# Patient Record
Sex: FEMALE | Race: WHITE | NOT HISPANIC OR LATINO | Employment: UNEMPLOYED | URBAN - METROPOLITAN AREA
[De-identification: names, ages, dates, MRNs, and addresses within clinical notes are randomized per-mention and may not be internally consistent; named-entity substitution may affect disease eponyms.]

---

## 2020-06-04 ENCOUNTER — LAB VISIT (OUTPATIENT)
Dept: PRIMARY CARE CLINIC | Facility: OTHER | Age: 70
End: 2020-06-04
Payer: MEDICARE

## 2020-06-04 DIAGNOSIS — Z11.59 ENCOUNTER FOR SCREENING FOR OTHER VIRAL DISEASES: ICD-10-CM

## 2020-06-04 PROCEDURE — U0003 INFECTIOUS AGENT DETECTION BY NUCLEIC ACID (DNA OR RNA); SEVERE ACUTE RESPIRATORY SYNDROME CORONAVIRUS 2 (SARS-COV-2) (CORONAVIRUS DISEASE [COVID-19]), AMPLIFIED PROBE TECHNIQUE, MAKING USE OF HIGH THROUGHPUT TECHNOLOGIES AS DESCRIBED BY CMS-2020-01-R: HCPCS

## 2020-06-05 LAB — SARS-COV-2 RNA RESP QL NAA+PROBE: NOT DETECTED

## 2021-01-09 ENCOUNTER — IMMUNIZATION (OUTPATIENT)
Dept: PRIMARY CARE CLINIC | Facility: CLINIC | Age: 71
End: 2021-01-09
Payer: MEDICARE

## 2021-01-09 DIAGNOSIS — Z23 NEED FOR VACCINATION: ICD-10-CM

## 2021-01-09 PROCEDURE — 91300 COVID-19, MRNA, LNP-S, PF, 30 MCG/0.3 ML DOSE VACCINE: CPT | Mod: PBBFAC | Performed by: FAMILY MEDICINE

## 2021-01-30 ENCOUNTER — IMMUNIZATION (OUTPATIENT)
Dept: PRIMARY CARE CLINIC | Facility: CLINIC | Age: 71
End: 2021-01-30
Payer: MEDICARE

## 2021-01-30 DIAGNOSIS — Z23 NEED FOR VACCINATION: Primary | ICD-10-CM

## 2021-01-30 PROCEDURE — 91300 COVID-19, MRNA, LNP-S, PF, 30 MCG/0.3 ML DOSE VACCINE: CPT | Mod: PBBFAC | Performed by: EMERGENCY MEDICINE

## 2021-01-30 PROCEDURE — 0002A COVID-19, MRNA, LNP-S, PF, 30 MCG/0.3 ML DOSE VACCINE: CPT | Mod: PBBFAC | Performed by: EMERGENCY MEDICINE

## 2024-02-29 ENCOUNTER — PATIENT MESSAGE (OUTPATIENT)
Dept: HEMATOLOGY/ONCOLOGY | Facility: CLINIC | Age: 74
End: 2024-02-29
Payer: MEDICARE

## 2024-02-29 ENCOUNTER — OFFICE VISIT (OUTPATIENT)
Dept: FAMILY MEDICINE | Facility: CLINIC | Age: 74
End: 2024-02-29
Payer: MEDICARE

## 2024-02-29 VITALS
SYSTOLIC BLOOD PRESSURE: 168 MMHG | DIASTOLIC BLOOD PRESSURE: 80 MMHG | HEART RATE: 72 BPM | OXYGEN SATURATION: 99 % | BODY MASS INDEX: 19.4 KG/M2 | TEMPERATURE: 98 F | WEIGHT: 102.75 LBS | HEIGHT: 61 IN

## 2024-02-29 DIAGNOSIS — R16.0 LIVER MASS: ICD-10-CM

## 2024-02-29 DIAGNOSIS — I70.0 AORTIC ATHEROSCLEROSIS: ICD-10-CM

## 2024-02-29 DIAGNOSIS — E83.52 HYPERCALCEMIA: ICD-10-CM

## 2024-02-29 DIAGNOSIS — R56.9 CONVULSIONS, UNSPECIFIED CONVULSION TYPE: ICD-10-CM

## 2024-02-29 DIAGNOSIS — R91.8 RIGHT LOWER LOBE LUNG MASS: Primary | ICD-10-CM

## 2024-02-29 DIAGNOSIS — Z79.4 TYPE 2 DIABETES MELLITUS WITH STAGE 3A CHRONIC KIDNEY DISEASE, WITH LONG-TERM CURRENT USE OF INSULIN: ICD-10-CM

## 2024-02-29 DIAGNOSIS — E11.22 TYPE 2 DIABETES MELLITUS WITH STAGE 3A CHRONIC KIDNEY DISEASE, WITH LONG-TERM CURRENT USE OF INSULIN: ICD-10-CM

## 2024-02-29 DIAGNOSIS — N18.31 TYPE 2 DIABETES MELLITUS WITH STAGE 3A CHRONIC KIDNEY DISEASE, WITH LONG-TERM CURRENT USE OF INSULIN: ICD-10-CM

## 2024-02-29 PROCEDURE — 99999 PR PBB SHADOW E&M-EST. PATIENT-LVL V: CPT | Mod: PBBFAC,,, | Performed by: INTERNAL MEDICINE

## 2024-02-29 PROCEDURE — 3079F DIAST BP 80-89 MM HG: CPT | Mod: CPTII,S$GLB,, | Performed by: INTERNAL MEDICINE

## 2024-02-29 PROCEDURE — 3077F SYST BP >= 140 MM HG: CPT | Mod: CPTII,S$GLB,, | Performed by: INTERNAL MEDICINE

## 2024-02-29 PROCEDURE — 1159F MED LIST DOCD IN RCRD: CPT | Mod: CPTII,S$GLB,, | Performed by: INTERNAL MEDICINE

## 2024-02-29 PROCEDURE — 1160F RVW MEDS BY RX/DR IN RCRD: CPT | Mod: CPTII,S$GLB,, | Performed by: INTERNAL MEDICINE

## 2024-02-29 PROCEDURE — 3288F FALL RISK ASSESSMENT DOCD: CPT | Mod: CPTII,S$GLB,, | Performed by: INTERNAL MEDICINE

## 2024-02-29 PROCEDURE — 1126F AMNT PAIN NOTED NONE PRSNT: CPT | Mod: CPTII,S$GLB,, | Performed by: INTERNAL MEDICINE

## 2024-02-29 PROCEDURE — 99203 OFFICE O/P NEW LOW 30 MIN: CPT | Mod: S$GLB,,, | Performed by: INTERNAL MEDICINE

## 2024-02-29 PROCEDURE — 1101F PT FALLS ASSESS-DOCD LE1/YR: CPT | Mod: CPTII,S$GLB,, | Performed by: INTERNAL MEDICINE

## 2024-02-29 RX ORDER — FUROSEMIDE 20 MG/1
20 TABLET ORAL 2 TIMES DAILY
COMMUNITY

## 2024-02-29 RX ORDER — LINAGLIPTIN 5 MG/1
5 TABLET, FILM COATED ORAL DAILY
COMMUNITY

## 2024-02-29 RX ORDER — LEVETIRACETAM 500 MG/1
TABLET ORAL
COMMUNITY

## 2024-02-29 RX ORDER — SOLIFENACIN SUCCINATE 10 MG/1
1 TABLET, FILM COATED ORAL DAILY
COMMUNITY
Start: 2023-07-04

## 2024-02-29 RX ORDER — ASPIRIN 81 MG/1
81 TABLET ORAL DAILY
COMMUNITY

## 2024-02-29 RX ORDER — EZETIMIBE 10 MG/1
10 TABLET ORAL DAILY
COMMUNITY

## 2024-02-29 RX ORDER — CARVEDILOL 12.5 MG/1
1 TABLET ORAL 2 TIMES DAILY WITH MEALS
COMMUNITY
Start: 2023-10-02

## 2024-02-29 RX ORDER — PANTOPRAZOLE SODIUM 40 MG/1
40 TABLET, DELAYED RELEASE ORAL EVERY MORNING
COMMUNITY
Start: 2023-12-03

## 2024-02-29 RX ORDER — MONTELUKAST SODIUM 10 MG/1
10 TABLET ORAL
COMMUNITY
Start: 2023-12-27

## 2024-02-29 RX ORDER — NITROGLYCERIN 0.4 MG/1
0.4 TABLET SUBLINGUAL
COMMUNITY

## 2024-02-29 RX ORDER — DRONABINOL 2.5 MG/1
2.5 CAPSULE ORAL
COMMUNITY

## 2024-02-29 RX ORDER — HYDRALAZINE HYDROCHLORIDE 25 MG/1
25 TABLET, FILM COATED ORAL 3 TIMES DAILY
COMMUNITY
End: 2024-05-07

## 2024-02-29 RX ORDER — LISINOPRIL 40 MG/1
1 TABLET ORAL DAILY
COMMUNITY
Start: 2023-10-02

## 2024-02-29 NOTE — PROGRESS NOTES
"Subjective:       Patient ID: Kylah Deal is a 73 y.o. female.    Chief Complaint: Establish Care and Hospital Follow Up    Est PCP/discuss outside testing    HPI: 72 y/o IDDM HTN seizure disorder presents alone to establish primary care. She was in usual state of health when she had lab work done for he cardiologist in early 2024. Noted elevated calcium to 13mg/dL prompting evaluation in outside ED> CT of chest abdomen and pelvis showed right hilar mass extending to right lower lobe and right lobe of liver mass. She was treated in hospital with IV fluids and lasix to decrease her calcium she also received subcutaneous prolia (had been getting this every six months for at least one year prior via her endocrinologist). She was discharged with follow up outpatient bronchoscopy performed 2024. Post bronchoscopy she became acutely short of breath and was again admitted for volume overload. She underwent coronary angiogram 2024 for NSTEMI and found to have  of RCA (no intervention performed). Since discharge she has seen outside pulmonologist at [facility] (Dr. Ron Eldridge). Unfortunately biopsies from her bronchoscopy in January were non diagnostic. She is now scheduled for mediastinabl lymph node biopsy tomorrow (2024) at [facility]. She presents today request referrals for these specialists due to insurance requirement. She denies any pain. She notes long standing lower back pain (at least last year) and constipation. She is taking laxative having bowel movement every other day. No LE swelling breathign "fine" not using any supplemental oxygen    SH: her   from alzhemier's dementia in 2023, she [place] five adult [place] [facility] [place] (one son in [place] [facility], daughter in law PA hospitalist at [facility])    PMHx: diabetes mellitus, seizure disorder, CKD IIIa, CAD      Review of Systems   Constitutional:  Positive for fatigue. Negative for activity change, appetite change, fever and " "unexpected weight change.   HENT:  Negative for ear pain, rhinorrhea and sore throat.    Eyes:  Negative for discharge and visual disturbance.   Respiratory:  Negative for chest tightness, shortness of breath and wheezing.    Cardiovascular:  Negative for chest pain, palpitations and leg swelling.   Gastrointestinal:  Positive for constipation. Negative for abdominal pain and diarrhea.   Endocrine: Negative for cold intolerance and heat intolerance.   Genitourinary:  Negative for dysuria and hematuria.   Musculoskeletal:  Negative for joint swelling and neck stiffness.   Skin:  Negative for rash.   Neurological:  Negative for dizziness, syncope, weakness and headaches.   Psychiatric/Behavioral:  Negative for suicidal ideas.        Objective:     Vitals:    02/29/24 1325   BP: (!) 168/80   BP Location: Left arm   Patient Position: Sitting   BP Method: Large (Manual)   Pulse: 72   Temp: 97.6 °F (36.4 °C)   TempSrc: Oral   SpO2: 99%   Weight: 46.6 kg (102 lb 11.8 oz)   Height: 5' 1" (1.549 m)          Physical Exam  Constitutional:       General: She is not in acute distress.     Comments: Thin no temporal wasting   HENT:      Head: Normocephalic and atraumatic.   Eyes:      General: No scleral icterus.  Abdominal:      General: There is no distension.      Palpations: Abdomen is soft.   Musculoskeletal:      Right lower leg: No edema.      Left lower leg: No edema.   Lymphadenopathy:      Cervical: No cervical adenopathy.   Neurological:      General: No focal deficit present.      Mental Status: She is alert and oriented to person, place, and time.   Psychiatric:         Mood and Affect: Mood normal.         Behavior: Behavior normal.         Thought Content: Thought content normal.         Assessment and Plan   1. Right lower lobe lung mass  Referrals back dated for prior speciality appoitnments to have mediastinal biopsy for tissue diagnosis  - Ambulatory referral/consult to Pulmonology; Future  - Ambulatory " referral/consult to Interventional Radiology; Future    2. Aortic atherosclerosis  On PSCk 9 continue    3. Hypercalcemia  Continue loop diuretic    4. Liver mass  As above    5. Type 2 diabetes mellitus with stage 3a chronic kidney disease, with long-term current use of insulin  Management per endocrine    6. Convulsions, unspecified convulsion type  No seizure in > 5 years on keppra continue

## 2024-03-01 ENCOUNTER — TELEPHONE (OUTPATIENT)
Dept: FAMILY MEDICINE | Facility: CLINIC | Age: 74
End: 2024-03-01
Payer: MEDICARE

## 2024-03-01 DIAGNOSIS — R91.8 RIGHT LOWER LOBE LUNG MASS: Primary | ICD-10-CM

## 2024-03-01 NOTE — TELEPHONE ENCOUNTER
----- Message from Luca Sanders sent at 3/1/2024 12:41 PM CST -----  Regarding: request for an oncology referral to Dr. Santoyo  Type: Patient Call Back    Who called: Kylah Deal      What is the request in detail: the patient is calling to request an oncology referral placed in the system to Dr. Santoyo. Patient stated that she was told on her visit on yesterday that the referral would be placed, but she does not see it. Please return call at earliest convenience.     Can the clinic reply by [facility]?    Would the patient rather a call back or a response via [facility]? Call back     Best call back number:[phone]

## 2024-03-07 ENCOUNTER — PATIENT MESSAGE (OUTPATIENT)
Dept: FAMILY MEDICINE | Facility: CLINIC | Age: 74
End: 2024-03-07
Payer: MEDICARE

## 2024-03-07 DIAGNOSIS — D86.0 SARCOIDOSIS OF LUNG: Primary | ICD-10-CM

## 2024-03-08 ENCOUNTER — PATIENT MESSAGE (OUTPATIENT)
Dept: FAMILY MEDICINE | Facility: CLINIC | Age: 74
End: 2024-03-08
Payer: MEDICARE

## 2024-03-08 ENCOUNTER — TELEPHONE (OUTPATIENT)
Dept: PULMONOLOGY | Facility: CLINIC | Age: 74
End: 2024-03-08
Payer: MEDICARE

## 2024-03-08 NOTE — TELEPHONE ENCOUNTER
I spoke with patient to schedule her appointment with Dr Briggs on 3/14/24 at 10:30am for second opinion for sarcoidosis. Patient has had lung bx, pet scan, pft's. She will bring a copy of her pft's at the time of her visit. Patient confirmed and verbalized understanding.

## 2024-03-14 ENCOUNTER — OFFICE VISIT (OUTPATIENT)
Dept: PULMONOLOGY | Facility: CLINIC | Age: 74
End: 2024-03-14
Payer: MEDICARE

## 2024-03-14 VITALS
WEIGHT: 103.63 LBS | BODY MASS INDEX: 17.27 KG/M2 | DIASTOLIC BLOOD PRESSURE: 80 MMHG | SYSTOLIC BLOOD PRESSURE: 142 MMHG | OXYGEN SATURATION: 99 % | HEIGHT: 65 IN | HEART RATE: 72 BPM

## 2024-03-14 DIAGNOSIS — R93.89 ABNORMAL CT OF THE CHEST: Primary | ICD-10-CM

## 2024-03-14 DIAGNOSIS — D86.0 SARCOIDOSIS OF LUNG: ICD-10-CM

## 2024-03-14 PROCEDURE — 99999 PR PBB SHADOW E&M-EST. PATIENT-LVL V: CPT | Mod: PBBFAC,,, | Performed by: INTERNAL MEDICINE

## 2024-03-14 PROCEDURE — 3079F DIAST BP 80-89 MM HG: CPT | Mod: CPTII,S$GLB,, | Performed by: INTERNAL MEDICINE

## 2024-03-14 PROCEDURE — 99205 OFFICE O/P NEW HI 60 MIN: CPT | Mod: S$GLB,,, | Performed by: INTERNAL MEDICINE

## 2024-03-14 PROCEDURE — 1159F MED LIST DOCD IN RCRD: CPT | Mod: CPTII,S$GLB,, | Performed by: INTERNAL MEDICINE

## 2024-03-14 PROCEDURE — 3077F SYST BP >= 140 MM HG: CPT | Mod: CPTII,S$GLB,, | Performed by: INTERNAL MEDICINE

## 2024-03-14 PROCEDURE — 1160F RVW MEDS BY RX/DR IN RCRD: CPT | Mod: CPTII,S$GLB,, | Performed by: INTERNAL MEDICINE

## 2024-03-14 PROCEDURE — 3288F FALL RISK ASSESSMENT DOCD: CPT | Mod: CPTII,S$GLB,, | Performed by: INTERNAL MEDICINE

## 2024-03-14 PROCEDURE — 1101F PT FALLS ASSESS-DOCD LE1/YR: CPT | Mod: CPTII,S$GLB,, | Performed by: INTERNAL MEDICINE

## 2024-03-14 RX ORDER — FLUTICASONE FUROATE, UMECLIDINIUM BROMIDE AND VILANTEROL TRIFENATATE 200; 62.5; 25 UG/1; UG/1; UG/1
1 POWDER RESPIRATORY (INHALATION)
COMMUNITY
Start: 2024-02-21

## 2024-03-14 RX ORDER — ALBUTEROL SULFATE 90 UG/1
2 AEROSOL, METERED RESPIRATORY (INHALATION) EVERY 4 HOURS PRN
COMMUNITY
Start: 2024-02-21 | End: 2025-02-20

## 2024-03-14 RX ORDER — CLOPIDOGREL BISULFATE 75 MG/1
1 TABLET ORAL DAILY
COMMUNITY
Start: 2024-02-29

## 2024-03-14 RX ORDER — ACETAMINOPHEN 500 MG
1 TABLET ORAL
COMMUNITY

## 2024-03-14 RX ORDER — AMOXICILLIN AND CLAVULANATE POTASSIUM 875; 125 MG/1; MG/1
1 TABLET, FILM COATED ORAL 2 TIMES DAILY
COMMUNITY
Start: 2024-01-14 | End: 2024-03-18

## 2024-03-14 RX ORDER — CODEINE PHOSPHATE AND GUAIFENESIN 10; 100 MG/5ML; MG/5ML
10 SOLUTION ORAL EVERY 4 HOURS PRN
COMMUNITY
Start: 2024-01-14

## 2024-03-14 NOTE — PROGRESS NOTES
Subjective:       Patient ID: Kylah Deal is a 73 y.o. female.    Chief Complaint: Sarcoidosis    HPI:   Kylah Deal is a 73 y.o. female who presents with for evaluation of abnormal imaging.  Accompanied by her daughter.     Noted to have hypercalcemia on routine labs.   This led to imaging.  There was concern for sarcoidosis.  BAL and TBBX were negative for granulomatous disease (which seems unusual given the degree of lung involvement.)   A follow up biopsy of a LN by IR resulted in non-caseating granulomas, however the sample was quite small and the physician did not feel that he had adequately biopsied the area.  The patient is understandably frustrated by the lack of definitive diagnosis at this juncture.     Additional pertinent history:   She reports that nothing tastes right  Reports significant sinus symptoms  Shortness of breath with minimal exertion.  Persistent dry and irritating cough.  She also reports significant weight loss and loss of appetite.    Former smoker. Stopped in .  1/2ppw age [age]   No known occupational exposures.  Father was a   Dad was a heavy smoker- 3ppd;  of lung cancer  Mother with COPD    Review of Systems   Constitutional:  Positive for weight loss and activity change.   HENT:  Positive for postnasal drip and congestion.         Dry mouth   Respiratory:  Positive for cough (for at least a year), sputum production (in the morning), shortness of breath and orthopnea.          Social History     Tobacco Use    Smoking status: Former     Current packs/day: 0.00     Types: Cigarettes     Quit date:      Years since quittin.2    Smokeless tobacco: Never   Substance Use Topics    Alcohol use: Not on file       Review of patient's allergies indicates:   Allergen Reactions    Atorvastatin calcium Other (See Comments)     Pain    Metformin Diarrhea     Diarrhea and cramping    Rosuvastatin Other (See Comments)     pain    Statins-hmg-coa reductase inhibitors       No past medical history on file.  No past surgical history on file.  Current Outpatient Medications on File Prior to Visit   Medication Sig    albuterol (PROVENTIL/VENTOLIN HFA) 90 mcg/actuation inhaler Inhale 2 puffs into the lungs every 4 (four) hours as needed.    amoxicillin-clavulanate 875-125mg (AUGMENTIN) 875-125 mg per tablet Take 1 tablet by mouth 2 (two) times daily.    aspirin (ECOTRIN) 81 MG EC tablet Take 81 mg by mouth once daily.    carvediloL (COREG) 12.5 MG tablet Take 1 tablet by mouth 2 (two) times daily with meals.    cholecalciferol, vitamin D3, (VITAMIN D3) 50 mcg (2,000 unit) Cap capsule Take 1 tablet by mouth.    clopidogreL (PLAVIX) 75 mg tablet Take 1 tablet by mouth once daily.    droNABinol (MARINOL) 2.5 MG capsule Take 2.5 mg by mouth 2 (two) times daily before meals.    evolocumab (REPATHA SURECLICK SUBQ) Inject into the skin.    ezetimibe (ZETIA) 10 mg tablet Take 10 mg by mouth once daily.    fluticasone/umeclidin/vilanter (TRELEGY ELLIPTA INHL) Inhale into the lungs.    furosemide (LASIX) 20 MG tablet Take 20 mg by mouth 2 (two) times daily.    guaiFENesin-codeine 100-10 mg/5 ml (TUSSI-ORGANIDIN NR)  mg/5 mL syrup Take 10 mLs by mouth every 4 (four) hours as needed.    hydrALAZINE (APRESOLINE) 25 MG tablet Take 25 mg by mouth 3 (three) times daily.    levETIRAcetam (KEPPRA) 500 MG Tab     linaGLIPtin (TRADJENTA) 5 mg Tab tablet Take 5 mg by mouth once daily.    lisinopriL (PRINIVIL,ZESTRIL) 40 MG tablet Take 1 tablet by mouth once daily.    montelukast (SINGULAIR) 10 mg tablet Take 10 mg by mouth.    nitroGLYCERIN (NITROSTAT) 0.4 MG SL tablet Place 0.4 mg under the tongue.    pantoprazole (PROTONIX) 40 MG tablet Take 40 mg by mouth every morning.    solifenacin (VESICARE) 10 MG tablet Take 1 tablet by mouth once daily.    TRELEGY ELLIPTA 200-62.5-25 mcg inhaler Inhale 1 puff into the lungs.     No current facility-administered medications on file prior to visit.  "      Objective:      Vitals:    03/14/24 1010   BP: (!) 142/80   BP Location: Right arm   Patient Position: Sitting   Pulse: 72   SpO2: 99%   Weight: 47 kg (103 lb 9.9 oz)   Height: 5' 5" (1.651 m)     Physical Exam   Constitutional: She is oriented to person, place, and time. She appears well-developed and well-nourished.   HENT:   Mouth/Throat: Mallampati Score: III.   Cardiovascular: Normal rate and regular rhythm.   Pulmonary/Chest: Normal expansion and breath sounds normal. She has no wheezes. She has no rhonchi.   Musculoskeletal:         General: No edema.   Lymphadenopathy: No supraclavicular adenopathy is present.     She has no cervical adenopathy.     She has no axillary adenopathy.   Neurological: She is alert and oriented to person, place, and time.   Skin: Skin is warm and dry.   Psychiatric: She has a normal mood and affect. Her behavior is normal. Judgment and thought content normal.     Personal Diagnostic Review        3/14/2024    10:10 AM 2/29/2024     1:25 PM   Pulmonary Function Tests   SpO2 99 % 99 %   Height 5' 5" (1.651 m) 5' 1" (1.549 m)   Weight 47 kg (103 lb 9.9 oz) 46.6 kg (102 lb 11.8 oz)   BMI (Calculated) 17.2 19.4         X-Ray Neck Soft Tissue  DATE OF EXAM: Apr 27 2012   WDX   0184  -  SOFT TISSUE NECK:     [id number]     CLINICAL HISTORY:   PAIN     ICD 9 CODE(S):   ()  CPT 4 CODE(S)/MODIFIER(S):   ()     HISTORY: Pain     COMPARISON: None     FINDINGS: Midline sternotomy wires and mediastinal clips are noted. Mild   multilevel degenerative change of the cervical spine noted.  No evidence   of displaced fracture or dislocation.  No prevertebral soft tissue   swelling.  Epiglottis is normal in thickness.  The vallecula and   piriforms appear within normal limits.  The airway is midline and patent.    No radiodense foreign body identified within the airway.  No   subcutaneous emphysema.  Mild atherosclerotic calcifications noted at the   right carotid bifurcation.      "   IMPRESSION:     As above.  ______________________________________      Electronically signed by: MILTON GUEVARA MD, MD  Date:     04/27/12  Time:    20:43            : SHANTI  Transcribe Date/Time: Apr 27 2012  8:44P  Dictated by : MILTON GUEVARA MD, MD  Read On:      Images were reviewed, findings were verified and document was   electronically  SIGNED BY: MILTON GUEVARA MD, MD On: Apr 27 2012  8:44P     PFTs: 2/8/24: moderate obstruction, moderate restriction, severely reduced DLCO.    Assessment:     Orders Placed This Encounter   Procedures    CT Chest Without Contrast     Standing Status:   Future     Number of Occurrences:   1     Standing Expiration Date:   3/15/2025     Order Specific Question:   May the Radiologist modify the order per protocol to meet the clinical needs of the patient?     Answer:   Yes     1. Abnormal CT of the chest    2. Sarcoidosis of lung        Plan:         Problem List Items Addressed This Visit          Other    Abnormal CT of the chest - Primary    Current Assessment & Plan     Imaging reviewed in detail.   CT Chest and PET from outside hospital show a diffuse parenchymal lung process.    The images could certainly be consistent with consistent with stage 3 sarcoidosis, but the weight loss and clinical history also keep metastatic cancer on the list of differential diagnoses.    I recommend a repeat CT Chest and repeat biopsy.  The PET shows a highly PET-Avid region in the sternum that may provide valuable information.    I have reached out to oncology to see if they would prefer to do the biopsy, or if IR would be acceptable.           Relevant Orders    CT Chest Without Contrast (Completed)     Other Visit Diagnoses       Sarcoidosis of lung               65 minutes of total time spent on the encounter, which includes face to face time and non-face to face time preparing to see the patient (eg, review of tests), Obtaining and/or reviewing separately obtained history,  Documenting clinical information in the electronic or other health record, Independently interpreting results (not separately reported) and communicating results to the patient/family/caregiver, or Care coordination (not separately reported).

## 2024-03-15 ENCOUNTER — PATIENT MESSAGE (OUTPATIENT)
Dept: PULMONOLOGY | Facility: CLINIC | Age: 74
End: 2024-03-15
Payer: MEDICARE

## 2024-03-15 ENCOUNTER — TELEPHONE (OUTPATIENT)
Dept: PULMONOLOGY | Facility: CLINIC | Age: 74
End: 2024-03-15
Payer: MEDICARE

## 2024-03-18 ENCOUNTER — OFFICE VISIT (OUTPATIENT)
Dept: HEMATOLOGY/ONCOLOGY | Facility: CLINIC | Age: 74
End: 2024-03-18
Payer: MEDICARE

## 2024-03-18 ENCOUNTER — PATIENT MESSAGE (OUTPATIENT)
Dept: HEMATOLOGY/ONCOLOGY | Facility: CLINIC | Age: 74
End: 2024-03-18

## 2024-03-18 ENCOUNTER — HOSPITAL ENCOUNTER (OUTPATIENT)
Dept: RADIOLOGY | Facility: HOSPITAL | Age: 74
Discharge: HOME | End: 2024-03-18
Attending: INTERNAL MEDICINE
Payer: MEDICARE

## 2024-03-18 ENCOUNTER — PATIENT MESSAGE (OUTPATIENT)
Dept: ADMINISTRATIVE | Facility: HOSPITAL | Age: 74
End: 2024-03-18
Payer: MEDICARE

## 2024-03-18 VITALS
TEMPERATURE: 98 F | HEART RATE: 76 BPM | RESPIRATION RATE: 16 BRPM | WEIGHT: 102.06 LBS | SYSTOLIC BLOOD PRESSURE: 200 MMHG | BODY MASS INDEX: 17 KG/M2 | HEIGHT: 65 IN | DIASTOLIC BLOOD PRESSURE: 77 MMHG

## 2024-03-18 DIAGNOSIS — I25.10 CORONARY ARTERY DISEASE INVOLVING NATIVE CORONARY ARTERY OF NATIVE HEART WITHOUT ANGINA PECTORIS: ICD-10-CM

## 2024-03-18 DIAGNOSIS — M54.2 NECK PAIN: ICD-10-CM

## 2024-03-18 DIAGNOSIS — R79.89 OTHER SPECIFIED ABNORMAL FINDINGS OF BLOOD CHEMISTRY: ICD-10-CM

## 2024-03-18 DIAGNOSIS — R97.8 OTHER ABNORMAL TUMOR MARKERS: ICD-10-CM

## 2024-03-18 DIAGNOSIS — R93.89 ABNORMAL CT OF THE CHEST: ICD-10-CM

## 2024-03-18 DIAGNOSIS — R16.0 LIVER MASS: ICD-10-CM

## 2024-03-18 DIAGNOSIS — M81.0 AGE-RELATED OSTEOPOROSIS WITHOUT CURRENT PATHOLOGICAL FRACTURE: ICD-10-CM

## 2024-03-18 DIAGNOSIS — I10 PRIMARY HYPERTENSION: ICD-10-CM

## 2024-03-18 DIAGNOSIS — R93.89 ABNORMAL CT OF THE CHEST: Primary | ICD-10-CM

## 2024-03-18 DIAGNOSIS — E11.9 TYPE 2 DIABETES MELLITUS WITHOUT COMPLICATION, WITHOUT LONG-TERM CURRENT USE OF INSULIN: ICD-10-CM

## 2024-03-18 PROCEDURE — 99205 OFFICE O/P NEW HI 60 MIN: CPT | Mod: S$GLB,,, | Performed by: INTERNAL MEDICINE

## 2024-03-18 PROCEDURE — 3288F FALL RISK ASSESSMENT DOCD: CPT | Mod: CPTII,S$GLB,, | Performed by: INTERNAL MEDICINE

## 2024-03-18 PROCEDURE — 3077F SYST BP >= 140 MM HG: CPT | Mod: CPTII,S$GLB,, | Performed by: INTERNAL MEDICINE

## 2024-03-18 PROCEDURE — 1125F AMNT PAIN NOTED PAIN PRSNT: CPT | Mod: CPTII,S$GLB,, | Performed by: INTERNAL MEDICINE

## 2024-03-18 PROCEDURE — 3078F DIAST BP <80 MM HG: CPT | Mod: CPTII,S$GLB,, | Performed by: INTERNAL MEDICINE

## 2024-03-18 PROCEDURE — 71250 CT THORAX DX C-: CPT | Mod: 26,,, | Performed by: STUDENT IN AN ORGANIZED HEALTH CARE EDUCATION/TRAINING PROGRAM

## 2024-03-18 PROCEDURE — 1101F PT FALLS ASSESS-DOCD LE1/YR: CPT | Mod: CPTII,S$GLB,, | Performed by: INTERNAL MEDICINE

## 2024-03-18 PROCEDURE — 99999 PR PBB SHADOW E&M-EST. PATIENT-LVL V: CPT | Mod: PBBFAC,,, | Performed by: INTERNAL MEDICINE

## 2024-03-18 PROCEDURE — 71250 CT THORAX DX C-: CPT | Mod: TC

## 2024-03-18 RX ORDER — INSULIN ASPART 100 [IU]/ML
2 INJECTION, SOLUTION INTRAVENOUS; SUBCUTANEOUS
COMMUNITY
Start: 2024-03-05

## 2024-03-18 RX ORDER — DENOSUMAB 60 MG/ML
60 INJECTION SUBCUTANEOUS
COMMUNITY

## 2024-03-18 NOTE — PROGRESS NOTES
[facility]     Outpatient Medical Oncology Note  Patient: Kylah Deal  MRN: [id number]  Primary Care Provider: Osmar Cruz MD  Chief Complaint:  Enrrique Hepatic Mass, RML Pulmonary Mass, Adenopathy, and Bone Lesions     Subjective     Subjective:   HPI:   Kylah Deal is a 73 y.o. female with PMH significant for:   - CAD s/p CABG (Plavix)  - DM2 (A1c: 6.8%, linagliptin)  - HTN (lisinopril)  - Possible COPD (PFTs - 2/8/24 - FEV1: 50-79%, mild restriction, decreased DLCO<40%)  - HTN  - Possible Seizure Disorder (Keppra)     She is referred to Medical Oncology as recent imaging performed in evaluation of hypercalcemia revealed a Enrrique Hepatic Mass, RML Pulmonary Mass, Adenopathy, and Bone Lesions, with recent CT guided core needle mediastinal node biopsy revealing focal noncaseating granulomatous inflammation w/o malignancy.      Oncology History:  1/2023: vague RUQ abdominal pain prompting a CT A/P - did not show biliary disease but did reveal opacities in right lung   1/2023: noted to be hypercalcemic (13) during [place] [facility] [place] hypercalcemia worsened in 1/2024 (14) prompting imaging below  1/13/2024: CT CAP w/ IV contrast: scattered pulmonary nodules - largest is a 4.3 cm Right hilar/RML pulmonary mass w/ occlusion of the RML bronchus, mass in the enrrique hepatis (surrounds the portal vein, CBD dilation, extending into the RP surrounding the aorta/iliac and SMA; tumor does not appear to originate in the pancreas, could be conglomerate node or primary mass, numerous liver lesions c/f metastasis, mesenteric adenopathy, splenic infarcts   1/31/23: Bronchoscopy: PATHOLOGY: RLL lung bx, LLL lung bx, Right mainstem bronchus, BAL, negative for carcinoma or granulomatous disease  2/20/2024: PET/CT: Hypermetabolic infiltrative mass in the enrrique hepatis/RP (superior/posterior to pancreas, SUV: 7.3, surrounding CBD), lungs, right hilum, mediastinum, pleura, pericardium,  liver (small, SUV: 3.4), mesenteric, RP, and pelvic adenopathy, left SC (SUV: 3.7), right neck (SUV: 2.4, inferior to parotid), diffuse interstitial involvement in both lungs (interstitial thickening/nodularity, mildly thickened pleural w/ uptake), bone (right femoral neck, pelvis, spine, scapula, sternum, SUV: 5.8)  3/1/2024: CT guided core needle mediastinal lymph node biopsy - focal noncaseating granulomatous inflammation - results are discordant and remain concerning for malignancy.    Interval History:    Mrs. Deal is overall doing well. She is accompanied today by 2 of her sons.     Patient's current symptoms are:  (1) Mild productive cough: chronic x numerous years, unchanged  (2) Fatigue: chronic but progressive over the last year  (3) MAHONEY: new over the last few months, no overt chest pain.   (4) HA: She has occasional new headaches w/o focal neurologic deficits. HA's are mild but regular, no associated visual changes, N/V, etc.  (5) Neck/L shoulder pain - started over the last several months as well, pain is mild, persistent not progressive, intermittent but daily, not currently requiring analgesia. Patient denies trauma, bowel or bladder incontinence, saddle anesthesia, neurologic deficit/weakness - particularly extremity weakness, numbness or tingling. No difficulties with ROM of the shoulder.    Patient otherwise denies fevers, chills, night sweats, chest pain, abdominal pain, N/V, diarrhea, constipation, weight loss, rashes     Review of Systems:  14-point review of systems was asked with the pertinent positives and/or negatives stated in HPI.     Past Medical History:   - CAD s/p CABG in 2000 (Plavix), T2DM, HTN (lisinopril), COPD (PFTs - 2/8/24 - FEV1: 50-79%, mild restriction, decreased DLCO<40%), GERD, HLD, HTN, Seizure Disorder (Dx in 2020, Keppra), Osteoporosis (on Prolia)    Past Surgical HIstory:  Cholecystectomy, POLINA, appendectomy, Left hip allison    Family History:   - Dad: Lung Cancer  (smoker)  - Sister: Breast Cancer (40s)  - Maternal Grandmother: Colon Cancer (70s)  - Paternal Aunt: Colon (50s)  - Maternal Uncle: Cancer NOS    Social History:   Lives: [place]  Recently   Children: Yes   Tobacco use: Former smoker - smoked from her 30s-50s, half pack per week, quit in 2008  Alcohol use: denies   Illicit drug use: denies    reports that she quit smoking about 16 years ago. Her smoking use included cigarettes. She has never used smokeless tobacco.     Allergies:  Review of patient's allergies indicates:   Allergen Reactions    Atorvastatin calcium Other (See Comments)     Pain    Metformin Diarrhea     Diarrhea and cramping    Rosuvastatin Other (See Comments)     pain    Statins-hmg-coa reductase inhibitors        Medications:  Current Outpatient Medications   Medication Instructions    albuterol (PROVENTIL/VENTOLIN HFA) 90 mcg/actuation inhaler 2 puffs, Inhalation, Every 4 hours PRN    aspirin (ECOTRIN) 81 mg, Oral, Daily    carvediloL (COREG) 12.5 MG tablet 1 tablet, Oral, 2 times daily with meals    cholecalciferol, vitamin D3, (VITAMIN D3) 50 mcg (2,000 unit) Cap capsule 1 tablet, Oral    ciprofloxacin (CIPRO) 500 mg, Oral, 2 times daily    clopidogreL (PLAVIX) 75 mg tablet 1 tablet, Oral, Daily    droNABinol (MARINOL) 2.5 mg, Oral, 2 times daily before meals    evolocumab (REPATHA SURECLICK SUBQ) Subcutaneous    ezetimibe (ZETIA) 10 mg, Oral, Daily    fluticasone (VERAMYST) 27.5 mcg/actuation nasal spray 2 sprays, Nasal, Daily    furosemide (LASIX) 20 mg, Oral, 2 times daily    guaiFENesin-codeine 100-10 mg/5 ml (TUSSI-ORGANIDIN NR)  mg/5 mL syrup 10 mLs, Oral, Every 4 hours PRN    hydrALAZINE (APRESOLINE) 25 mg, Oral, 3 times daily    insulin aspart U-100 (NOVOLOG) 2 Units, Subcutaneous, As needed (PRN)    levETIRAcetam (KEPPRA) 500 MG Tab     lisinopriL (PRINIVIL,ZESTRIL) 40 MG tablet 1 tablet, Oral, Daily    montelukast (SINGULAIR) 10 mg, Oral    nitroGLYCERIN (NITROSTAT) 0.4  "mg, Sublingual    pantoprazole (PROTONIX) 40 mg, Oral, Every morning    PROLIA 60 mg, Subcutaneous, Every 6 months    solifenacin (VESICARE) 10 MG tablet 1 tablet, Oral, Daily    TRADJENTA 5 mg, Oral, Daily    TRELEGY ELLIPTA 200-62.5-25 mcg inhaler 1 puff, Inhalation          Objective:   Vitals:   Vitals:    03/18/24 0911   BP: (!) 200/77   BP Location: Right arm   Patient Position: Sitting   BP Method: Medium (Automatic)   Pulse: 76   Resp: 16   Temp: 97.5 °F (36.4 °C)   TempSrc: Oral   Weight: 46.3 kg (102 lb 1.2 oz)   Height: 5' 5" (1.651 m)     BMI: Body mass index is 16.99 kg/m².  ECOG Performance Status: 1    Physical Exam     General Appearance:    Alert, cooperative, no distress    Head:    Normocephalic, without obvious abnormality, atraumatic   Throat:   Lips, mucosa, and tongue normal; teeth and gums normal   Neck:   Supple, symmetrical, no adenopathy;     thyroid:  no enlargement/tenderness/nodules   Lungs:     Clear to auscultation bilaterally, respirations unlabored    Heart:    Regular rate and rhythm, S1 and S2 normal   Abdomen:     Soft, non-tender, bowel sounds active, no masses, no organomegaly   Extremities:   Extremities normal, atraumatic, no cyanosis or edema   Pulses:   2+ and symmetric all extremities   Skin:   Skin color, texture, turgor normal, no rashes or lesions   Lymph nodes:   Cervical, supraclavicular, and axillary nodes normal   Neurologic:   CNII-XII intact, normal strength, sensation      Laboratory Data:  Lab Visit on 03/18/2024   Component Date Value Ref Range Status    WBC 03/18/2024 6.45  3.90 - 12.70 K/uL Final    RBC 03/18/2024 4.24  4.00 - 5.40 M/uL Final    Hemoglobin 03/18/2024 12.1  12.0 - 16.0 g/dL Final    Hematocrit 03/18/2024 37.3  37.0 - 48.5 % Final    MCV 03/18/2024 88  82 - 98 fL Final    MCH 03/18/2024 28.5  27.0 - 31.0 pg Final    MCHC 03/18/2024 32.4  32.0 - 36.0 g/dL Final    RDW 03/18/2024 13.4  11.5 - 14.5 % Final    Platelets 03/18/2024 332  150 - 450 " K/uL Final    MPV 03/18/2024 11.5  9.2 - 12.9 fL Final    Immature Granulocytes 03/18/2024 0.5  0.0 - 0.5 % Final    Gran # (ANC) 03/18/2024 4.4  1.8 - 7.7 K/uL Final    Immature Grans (Abs) 03/18/2024 0.03  0.00 - 0.04 K/uL Final    Comment: Mild elevation in immature granulocytes is non specific and   can be seen in a variety of conditions including stress response,   acute inflammation, trauma and pregnancy. Correlation with other   laboratory and clinical findings is essential.      Lymph # 03/18/2024 1.0  1.0 - 4.8 K/uL Final    Mono # 03/18/2024 0.7  0.3 - 1.0 K/uL Final    Eos # 03/18/2024 0.3  0.0 - 0.5 K/uL Final    Baso # 03/18/2024 0.02  0.00 - 0.20 K/uL Final    nRBC 03/18/2024 0  0 /100 WBC Final    Gran % 03/18/2024 67.5  38.0 - 73.0 % Final    Lymph % 03/18/2024 15.7 (L)  18.0 - 48.0 % Final    Mono % 03/18/2024 11.2  4.0 - 15.0 % Final    Eosinophil % 03/18/2024 4.8  0.0 - 8.0 % Final    Basophil % 03/18/2024 0.3  0.0 - 1.9 % Final    Differential Method 03/18/2024 Automated   Final    Sodium 03/18/2024 141  136 - 145 mmol/L Final    Potassium 03/18/2024 3.8  3.5 - 5.1 mmol/L Final    Chloride 03/18/2024 103  95 - 110 mmol/L Final    CO2 03/18/2024 27  23 - 29 mmol/L Final    Glucose 03/18/2024 150 (H)  70 - 110 mg/dL Final    BUN 03/18/2024 18  8 - 23 mg/dL Final    Creatinine 03/18/2024 0.9  0.5 - 1.4 mg/dL Final    Calcium 03/18/2024 11.9 (H)  8.7 - 10.5 mg/dL Final    Total Protein 03/18/2024 8.0  6.0 - 8.4 g/dL Final    Albumin 03/18/2024 3.6  3.5 - 5.2 g/dL Final    Total Bilirubin 03/18/2024 0.4  0.1 - 1.0 mg/dL Final    Comment: For infants and newborns, interpretation of results should be based  on gestational age, weight and in agreement with clinical  observations.    Premature Infant recommended reference ranges:  Up to 24 hours.............<8.0 mg/dL  Up to 48 hours............<12.0 mg/dL  3-5 days..................<15.0 mg/dL  6-29 days.................<15.0 mg/dL      Alkaline  Phosphatase 03/18/2024 83  55 - 135 U/L Final    AST 03/18/2024 21  10 - 40 U/L Final    ALT 03/18/2024 20  10 - 44 U/L Final    eGFR 03/18/2024 >60.0  >60 mL/min/1.73 m^2 Final    Anion Gap 03/18/2024 11  8 - 16 mmol/L Final    Protein, Serum 03/18/2024 7.1  6.0 - 8.4 g/dL Final    Comment: Serum protein electrophoresis and immunofixation results should be   interpreted in clinical context in that some therapeutic agents can   result   in false positive results (example, daratumumab). Correlation with   the   patient s therapeutic regimen is required.      Albumin 03/18/2024 3.78  3.35 - 5.55 g/dL Final    Alpha-1 03/18/2024 0.45 (H)  0.17 - 0.41 g/dL Final    Alpha-2 03/18/2024 1.11 (H)  0.43 - 0.99 g/dL Final    Beta 03/18/2024 0.80  0.50 - 1.10 g/dL Final    Gamma 03/18/2024 0.97  0.67 - 1.58 g/dL Final    Immunofix Interp. 03/18/2024 SEE COMMENT   Final    Comment: Serum protein electrophoresis and immunofixation results should be   interpreted in clinical context in that some therapeutic agents can   result   in false positive results (example, daratumumab). Correlation with   the   patient s therapeutic regimen is required.  See pathologist's interpretation.      [place] Free Light Chains 03/18/2024 2.36 (H)  0.33 - 1.94 mg/dL Final    Lambda Free Light Chains 03/18/2024 2.09  0.57 - 2.63 mg/dL Final    Kappa/Lambda FLC Ratio 03/18/2024 1.13  0.26 - 1.65 Final    Comment: Undetected antigen excess is a rare event but cannot   be excluded. If these free light chain results do not   agree with other clinical or laboratory findings or   if the sample is from a patient that has previously   demonstrated antigen excess, discuss with the testing   laboratory.   Results should always be interpreted in conjunction   with other laboratory tests and clinical evidence.      Iron 03/18/2024 46  30 - 160 ug/dL Final    Transferrin 03/18/2024 248  200 - 375 mg/dL Final    TIBC 03/18/2024 367  250 - 450 ug/dL Final     Saturated Iron 03/18/2024 13 (L)  20 - 50 % Final    Ferritin 03/18/2024 216  20.0 - 300.0 ng/mL Final    HIV 1/2 Ag/Ab 03/18/2024 Non-reactive  Non-reactive Final    Hepatitis C Ab 03/18/2024 Non-reactive  Non-reactive Final    Hep B Core Total Ab 03/18/2024 Non-reactive  Non-reactive Final    Hep B S Ab 03/18/2024 <3.00  mIU/mL Final    Hep B S Ab 03/18/2024 Non-reactive   Final    Individual is considered not immune to HBV infection.    Hepatitis B Surface Ag 03/18/2024 Non-reactive  Non-reactive Final    Pathologist Review 03/18/2024 Review completed   Final    LD 03/18/2024 148  110 - 260 U/L Final    Results are increased in hemolyzed samples.    Retic 03/18/2024 1.9  0.5 - 2.5 % Final    CEA 03/18/2024 1.7  0.0 - 5.0 ng/mL Final    Comment: CEA Normal Range:  Non-Smokers: 0-3.0 ng/mL  Smokers:     0-5.0 ng/mL    The testing method is a chemiluminescent microparticle immunoassay   manufactured by [employer] and performed on the [employer]   or   [employer] system. Values obtained with different assay manufacturers   for   methods may be different and cannot be used interchangeably.      CA 19-9 03/18/2024 25.4  0.0 - 40.0 U/mL Final    Comment: The testing method is a chemiluminescent microparticle immunoassay   manufactured by [employer] and performed on the [employer]   or   [employer] system. Values obtained with different assay manufacturers   for   methods may be different and cannot be used interchangeably.       03/18/2024 83 (H)  0 - 30 U/mL Final    Comment: The testing method is a chemiluminescent microparticle immunoassay   manufactured by [employer] and performed on the [employer]   or   [employer] system. Values obtained with different assay manufacturers   for   methods may be different and cannot be used interchangeably.      AFP 03/18/2024 <2.0  0.0 - 8.4 ng/mL Final    Comment: The testing method is a chemiluminescent microparticle immunoassay   manufactured by  [employer] and performed on the    or   [place] system. Values obtained with different assay manufacturers   for   methods may be different and cannot be used interchangeably.      Beta-2 Microglobulin 2024 4.5 (H)  0.0 - 2.5 ug/mL Final    Uric Acid 2024 2.7  2.4 - 5.7 mg/dL Final    Pathologist Review Peripheral Smear 2024 REVIEWED   Final    Comment:   Electronically reviewed and signed by:  Tyrel Salgado MD  Signed on 24 at 11:48  PATHOLOGIST INTERPRETATION  RBC- No significant morphologic abnormalities, normal indices  WBC- Normal morphology.  No dysplasia or circulating blasts.   PLT- Normal in number and morphology. No clumping.       Pathologist Interpretation GRACE 2024 REVIEWED   Final    Comment:   Electronically reviewed and signed by:  Telma Romero MD  Signed on 24 at 13:24  Faint IgM lambda specific monoclonal band present.      Pathologist Interpretation SPE 2024 REVIEWED   Final    Comment:   Electronically reviewed and signed by:  Telma Romero MD  Signed on 24 at 13:24  Normal total protein.  Faint paraprotein band in gamma = 0.21 g/dL.       Recent Labs   Lab Result Units 24  1106   WBC K/uL 6.45   Hemoglobin g/dL 12.1   Hematocrit % 37.3   Platelets K/uL 332     Recent Labs   Lab Result Units 24  1106   Creatinine mg/dL 0.9   AST U/L 21   ALT U/L 20     Recent Labs   Lab Result Units 24  1106   Iron ug/dL 46   Ferritin ng/mL 216        Imagin2024: CT CAP w/ IV contrast: scattered pulmonary nodules - largest is a 4.3 cm Right hilar/RML pulmonary mass w/ occlusion of the RML bronchus, mass in the anastacia hepatis (surrounds the portal vein, CBD dilation, extending into the RP surrounding the aorta/iliac and SMA; tumor does not appear to originate in the pancreas, could be conglomerate node or primary mass, numerous liver lesions c/f metastasis, mesenteric adenopathy, splenic infarcts  "    2/20/2024: PET/CT: Hypermetabolic infiltrative mass in the enrrique hepatis/RP (superior/posterior to pancreas, SUV: 7.3, surrounding CBD), lungs, right hilum, mediastinum, pleura, pericardium, liver (small, SUV: 3.4), mesenteric, RP, and pelvic adenopathy, left SC (SUV: 3.7), right neck (SUV: 2.4, inferior to parotid), diffuse interstitial involvement in both lungs (interstitial thickening/nodularity, mildly thickened pleural w/ uptake), bone (right femoral neck, pelvis, spine, scapula, sternum, SUV: 5.8)    Pathology:   3/1/2024: CT guided core needle mediastinal lymph node biopsy - focal noncaseating granulomatous inflammation, "one of the fragments show a few small non-caseating granulomas, no evidence of malignancy"    Assessment   Assessment and Plan:   Kylah Deal is a 73 y.o. female former smoker with CAD s/p CABG, DM2, HTN, possible COPD (PFTs - 2/8/24 - FEV1: 50-79%, mild restriction, decreased DLCO<40%), HTN, Possible Seizure Disorder (Keppra). She is referred to Medical Oncology as recent imaging performed in evaluation of hypercalcemia revealed a Enrrique Hepatic Mass, RML Pulmonary Mass, Adenopathy, and Bone Lesions, with recent CT guided core needle mediastinal node biopsy revealing focal noncaseating granulomatous inflammation w/o malignancy.      # RML Pulmonary Mass  # Enrrique Hepatic Mass  # Adenopathy  # Bone Lesions   - Reviewed outside CT and subsequent [place] [facility] [place] a hypermetabolic enrrique hepatic mass, RML pulmonary mass with occlusion of the RML bronchus, scattered adenopathy, and bone lesions. CT guided core needle mediastinal node biopsy on 3/1/2024 revealing a small fragment of noncaseating granulomas w/o overt malignancy. She has also had a bronch w/ biopsies in [place] at [facility] [place] w/ negative results.   - Her scans are discordant to the biopsy results and remain concerning for malignancy. Will review at thoracic MDC to discuss utility of re-biopsy for more tissue, best site to " biopsy, and pulmonology's opinion on if this could represent sarcoidosis.   - Given headaches/neck/L shoulder pain, will obtain MRI for further evaluation     # Other Co-Morbidities:  - CAD s/p CABG (Plavix), DM2 (linagliptin), HTN (lisinopril), COPD (PFTs - 24 - FEV1: 50-79%, mild restriction, decreased DLCO<40%), HTN, Possible Seizure Disorder (Keppra): stable on current regimen, per PCP (outside of [facility])  - Genetics: pending above, consider genetic testing given strong FH  - Other Cancer Screening: Colonoscopy: 3/22/2019, [id number], Pap: POLINA in     Follow Up:  - Labs: CBC, CMP, liquid biopsy, iron labs, SPEP/GRACE/FLC, HIV/hepatitis serologies, peripheral smear, tumor markers- CEA, , , AFP, LDH, beta 2 microglobulin, uric acid   - Referrals: IR to biopsy  - Imaging: MRI brain/neck and shoulder   - RTC after MDC discussion  [] genetics, prolia      Med Onc Chart Routing      Follow up with physician . RTC on 3/28   Follow up with [facility]    Infusion scheduling note    Injection scheduling note    Labs   Scheduling:  Preferred lab:  Lab interval:  Please schedule all labs for today (including tempus)   Imaging MRI   Brain, Neck and Left shoulder MRIs ASAP   Pharmacy appointment    Other referrals              MDM includes:    - Acute or chronic illness or injury that poses a threat to life or bodily function  - Consideration and discussion of significant complications based on comorbidities  - Review of prior external notes from unique source and review of diagnostic tests and information  - Independent review and explanation of 3+ results from unique tests   - Discussion of management and ordering 3+ unique tests   - Extensive discussion of treatment and management including consideration of possible diagnoses and management options  - Prescription drug management  - Drug therapy requiring intensive monitoring for toxicity    - Patient seen in diagnosis clinic.   - Overall, I discussed the  diagnosis, history, stage, labs/imaging, prognosis, management, and treatment plan as applicable. I reviewed adverse short and long term effects as applicable.   - Informed patient if symptoms are getting worse that it is their responsibility to call the clinic and schedule follow up sooner than stated follow up. Also informed patient if they do not hear from the appointment center in 2-5 business days for their referrals, the patient must call the Oncology clinic so we can follow up on procedures or referral scheduling. Patient was fully informed of current medical plan, all questions were answered and patient verbalized understanding. No further questions.   - Face to Face Visit time I spent with the patient: 60 minutes of total time spent on the encounter, including counseling patient and/or coordinating care, which includes face to face time and non-face to face time preparing to see the patient (eg, review of tests), Obtaining and/or reviewing separately obtained history, Documenting clinical information in the electronic or other health record, Independently interpreting results (not separately reported) and communicating results to the patient/family/caregiver, or Care coordination (not separately reported).     Signed   Karla Sanchez MD  [facility]

## 2024-03-19 ENCOUNTER — PATIENT OUTREACH (OUTPATIENT)
Dept: ADMINISTRATIVE | Facility: HOSPITAL | Age: 74
End: 2024-03-19
Payer: MEDICARE

## 2024-03-19 DIAGNOSIS — Z12.11 COLON CANCER SCREENING: Primary | ICD-10-CM

## 2024-03-19 NOTE — ASSESSMENT & PLAN NOTE
Imaging reviewed in detail.   CT Chest and PET from outside hospital show a diffuse parenchymal lung process.    The images could certainly be consistent with consistent with stage 3 sarcoidosis, but the weight loss and clinical history also keep metastatic cancer on the list of differential diagnoses.    I recommend a repeat CT Chest and repeat biopsy.  The PET shows a highly PET-Avid region in the sternum that may provide valuable information.    I have reached out to oncology to see if they would prefer to do the biopsy, or if IR would be acceptable.

## 2024-03-21 ENCOUNTER — OFFICE VISIT (OUTPATIENT)
Dept: INTERVENTIONAL RADIOLOGY/VASCULAR | Facility: CLINIC | Age: 74
End: 2024-03-21
Payer: MEDICARE

## 2024-03-21 DIAGNOSIS — R91.8 PULMONARY MASS: Primary | ICD-10-CM

## 2024-03-21 DIAGNOSIS — D49.9 NEOPLASM: ICD-10-CM

## 2024-03-21 PROCEDURE — 99204 OFFICE O/P NEW MOD 45 MIN: CPT | Mod: 95,,, | Performed by: FAMILY MEDICINE

## 2024-03-21 PROCEDURE — 1160F RVW MEDS BY RX/DR IN RCRD: CPT | Mod: CPTII,95,, | Performed by: FAMILY MEDICINE

## 2024-03-21 PROCEDURE — 1159F MED LIST DOCD IN RCRD: CPT | Mod: CPTII,95,, | Performed by: FAMILY MEDICINE

## 2024-03-21 RX ORDER — CIPROFLOXACIN 500 MG/5ML
500 KIT ORAL 2 TIMES DAILY
COMMUNITY
End: 2024-04-17

## 2024-03-21 NOTE — Clinical Note
"Thank you for referring Ms. Deal to Interventional Radiology at the [facility]. Please don't hesitate to contact us if there are any questions regarding this evaluation at [phone]. If you have any other patients for whom you would like a consultation, please place an order for "[id number]", and we will be happy to review their case.  Sincerely, BRIANNA Dinero, FNP Interventional Radiology   "

## 2024-03-21 NOTE — PROGRESS NOTES
"Subjective     Patient ID: Kylah Deal is a 73 y.o. female.    Chief Complaint: Lesion    Virtual visit with patient referred to Interventional Radiology by Karla Sanchez MD for evaluation of a right lung mass. Mass was incidentally found during workup for hypercalcemia. She underwent a mediastinal lymph node biopsy on 3/1/2024 at an outside facility. Pathology revealed "Focal noncaseating granulomatous inflammation." Repeat imaging with CT scan obtained on 3/18/2024 noted "Bilateral perihilar and peribronchial predominant irregular and nodular consolidations with addition nodular consolidations along the anand lymphatic and subpleural spaces.  Overall stable to minimally worse compared to prior study." She is a former smoker and has possible COPD (PFTs - 2/8/24 - FEV1: 50-79%, mild restriction, decreased DLCO<40%). She tells me she has difficulty breathing when lying down and uses two pillows to sleep. She has complaints of decreased appetite, decreased activity, and fatigue. She also admits to a chronic cough and dyspnea with exertion. She denies any hemoptysis.      Review of Systems   Constitutional:  Positive for activity change (decreased x several months), appetite change (decreased x several months) and fatigue. Negative for chills and fever.   Respiratory:  Positive for cough (x years; attributed to sinus; has worsened since the beginning of the year; inhalers are helping) and shortness of breath (with exertion since the beginning of the year). Negative for wheezing and stridor.    Cardiovascular:  Negative for chest pain, palpitations and leg swelling.   Gastrointestinal:  Positive for abdominal distention, constipation and nausea ("off and on"). Negative for abdominal pain, diarrhea and vomiting.          Objective     Physical Exam  Constitutional:       General: She is not in acute distress.     Appearance: She is well-developed. She is not diaphoretic.   HENT:      Head: Normocephalic and " atraumatic.   Pulmonary:      Effort: Pulmonary effort is normal. No respiratory distress.   Neurological:      Mental Status: She is alert and oriented to person, place, and time.   Psychiatric:         Behavior: Behavior normal.         Thought Content: Thought content normal.         Judgment: Judgment normal.     Reviewed oncology progress note    CT 3/18/2024       Assessment and Plan     1. Pulmonary mass  -     IR Biopsy Lung w/ guidance; Future; Expected date: 03/21/2024  -     Protime-INR; Future; Expected date: 03/21/2024    2. Neoplasm  -     Protime-INR; Future; Expected date: 03/21/2024        The patient location is: [place]  The chief complaint leading to consultation is: lung mass    Visit type: audiovisual    Face to Face time with patient: 20 minutes  25 minutes of total time spent on the encounter, which includes face to face time and non-face to face time preparing to see the patient (eg, review of tests), Obtaining and/or reviewing separately obtained history, Documenting clinical information in the electronic or other health record, Independently interpreting results (not separately reported) and communicating results to the patient/family/caregiver, or Care coordination (not separately reported).         Each patient to whom he or she provides medical services by telemedicine is:  (1) informed of the relationship between the physician and patient and the respective role of any other health care provider with respect to management of the patient; and (2) notified that he or she may decline to receive medical services by telemedicine and may withdraw from such care at any time.    Notes:   Discussed case with Dr. Hagen. Explained to patient can offer biopsy of right lung mass. Discussed how the procedure will be performed, risks (including, but not limited to, pain, bleeding, infection, damage to nearby structures, potential to develop pneumothorax and subsequent need for chest tube  placement, and the need for additional procedures), benefits, possible complications, pre-post procedure expectations, and alternatives. Advised to hold ASA and plavix for 5 days prior to biopsy. Patient tells me she has done this in the past without issue. The patient voices understanding and all questions have been answered.  The patient agrees to proceed as planned. Patient scheduled for 4/5/2024 with anesthesia due to patient's difficulty breathing when lying down. Clinic phone number provided.

## 2024-03-22 ENCOUNTER — PATIENT MESSAGE (OUTPATIENT)
Dept: HEMATOLOGY/ONCOLOGY | Facility: CLINIC | Age: 74
End: 2024-03-22
Payer: MEDICARE

## 2024-03-26 DIAGNOSIS — Z00.00 ENCOUNTER FOR MEDICARE ANNUAL WELLNESS EXAM: ICD-10-CM

## 2024-03-26 DIAGNOSIS — E83.52 HYPERCALCEMIA: Primary | ICD-10-CM

## 2024-03-26 PROBLEM — Z79.82 LONG TERM CURRENT USE OF ASPIRIN: Status: ACTIVE | Noted: 2021-10-27

## 2024-03-26 PROBLEM — I51.89 GRADE I DIASTOLIC DYSFUNCTION: Status: ACTIVE | Noted: 2021-10-27

## 2024-03-26 PROBLEM — E11.9 TYPE 2 DIABETES MELLITUS WITHOUT COMPLICATION, WITHOUT LONG-TERM CURRENT USE OF INSULIN: Status: ACTIVE | Noted: 2024-03-26

## 2024-03-26 PROBLEM — I25.10 CORONARY ARTERY DISEASE INVOLVING NATIVE CORONARY ARTERY OF NATIVE HEART WITHOUT ANGINA PECTORIS: Status: ACTIVE | Noted: 2024-03-26

## 2024-03-26 PROBLEM — Z87.891 HISTORY OF TOBACCO ABUSE: Status: ACTIVE | Noted: 2024-03-26

## 2024-03-26 PROBLEM — E03.9 HYPOTHYROIDISM: Status: ACTIVE | Noted: 2021-06-01

## 2024-03-26 PROBLEM — Z95.1 HX OF CABG: Status: ACTIVE | Noted: 2019-12-19

## 2024-03-26 PROBLEM — K21.9 GASTROESOPHAGEAL REFLUX DISEASE WITHOUT ESOPHAGITIS: Status: ACTIVE | Noted: 2018-11-29

## 2024-03-26 PROBLEM — M81.0 AGE-RELATED OSTEOPOROSIS WITHOUT CURRENT PATHOLOGICAL FRACTURE: Status: ACTIVE | Noted: 2024-03-26

## 2024-03-26 PROBLEM — E78.5 HYPERLIPIDEMIA: Status: ACTIVE | Noted: 2018-11-29

## 2024-03-26 PROBLEM — G25.0 ESSENTIAL TREMOR: Status: ACTIVE | Noted: 2021-10-27

## 2024-03-26 PROBLEM — I10 HTN (HYPERTENSION): Status: ACTIVE | Noted: 2024-03-26

## 2024-03-26 PROBLEM — I11.0 HYPERTENSIVE HEART DISEASE WITH HEART FAILURE: Status: ACTIVE | Noted: 2024-03-26

## 2024-03-27 ENCOUNTER — PATIENT MESSAGE (OUTPATIENT)
Dept: ADMINISTRATIVE | Facility: HOSPITAL | Age: 74
End: 2024-03-27
Payer: MEDICARE

## 2024-03-27 ENCOUNTER — HOSPITAL ENCOUNTER (OUTPATIENT)
Dept: RADIOLOGY | Facility: HOSPITAL | Age: 74
Discharge: HOME | End: 2024-03-27
Attending: INTERNAL MEDICINE
Payer: MEDICARE

## 2024-03-27 DIAGNOSIS — E11.9 TYPE 2 DIABETES MELLITUS WITHOUT COMPLICATION, UNSPECIFIED WHETHER LONG TERM INSULIN USE: ICD-10-CM

## 2024-03-27 DIAGNOSIS — E11.9 TYPE 2 DIABETES MELLITUS WITHOUT COMPLICATION: ICD-10-CM

## 2024-03-27 DIAGNOSIS — M54.2 NECK PAIN: ICD-10-CM

## 2024-03-27 PROCEDURE — 70553 MRI BRAIN STEM W/O & W/DYE: CPT | Mod: TC

## 2024-03-27 PROCEDURE — 73223 MRI JOINT UPR EXTR W/O&W/DYE: CPT | Mod: 26,LT,, | Performed by: RADIOLOGY

## 2024-03-27 PROCEDURE — 70553 MRI BRAIN STEM W/O & W/DYE: CPT | Mod: 26,,, | Performed by: RADIOLOGY

## 2024-03-27 PROCEDURE — 73223 MRI JOINT UPR EXTR W/O&W/DYE: CPT | Mod: TC,LT

## 2024-03-27 PROCEDURE — A9585 GADOBUTROL INJECTION: HCPCS | Performed by: INTERNAL MEDICINE

## 2024-03-27 PROCEDURE — 25500020 PHARM REV CODE 255: Performed by: INTERNAL MEDICINE

## 2024-03-27 PROCEDURE — 72156 MRI NECK SPINE W/O & W/DYE: CPT | Mod: 26,,, | Performed by: INTERNAL MEDICINE

## 2024-03-27 PROCEDURE — 72156 MRI NECK SPINE W/O & W/DYE: CPT | Mod: TC

## 2024-03-27 RX ORDER — GADOBUTROL 604.72 MG/ML
4.5 INJECTION INTRAVENOUS
Status: COMPLETED | OUTPATIENT
Start: 2024-03-27 | End: 2024-03-27

## 2024-03-27 RX ADMIN — GADOBUTROL 4.5 ML: 604.72 INJECTION INTRAVENOUS at 03:03

## 2024-03-28 ENCOUNTER — OFFICE VISIT (OUTPATIENT)
Dept: HEMATOLOGY/ONCOLOGY | Facility: CLINIC | Age: 74
End: 2024-03-28
Payer: MEDICARE

## 2024-03-28 VITALS
DIASTOLIC BLOOD PRESSURE: 97 MMHG | HEIGHT: 65 IN | TEMPERATURE: 98 F | SYSTOLIC BLOOD PRESSURE: 197 MMHG | WEIGHT: 102.06 LBS | OXYGEN SATURATION: 98 % | BODY MASS INDEX: 17 KG/M2 | HEART RATE: 83 BPM | RESPIRATION RATE: 16 BRPM

## 2024-03-28 DIAGNOSIS — E83.52 HYPERCALCEMIA: Primary | ICD-10-CM

## 2024-03-28 DIAGNOSIS — M75.102 TEAR OF LEFT ROTATOR CUFF, UNSPECIFIED TEAR EXTENT, UNSPECIFIED WHETHER TRAUMATIC: ICD-10-CM

## 2024-03-28 PROCEDURE — 99215 OFFICE O/P EST HI 40 MIN: CPT | Mod: S$GLB,,, | Performed by: INTERNAL MEDICINE

## 2024-03-28 PROCEDURE — 3077F SYST BP >= 140 MM HG: CPT | Mod: CPTII,S$GLB,, | Performed by: INTERNAL MEDICINE

## 2024-03-28 PROCEDURE — 1160F RVW MEDS BY RX/DR IN RCRD: CPT | Mod: CPTII,S$GLB,, | Performed by: INTERNAL MEDICINE

## 2024-03-28 PROCEDURE — 3288F FALL RISK ASSESSMENT DOCD: CPT | Mod: CPTII,S$GLB,, | Performed by: INTERNAL MEDICINE

## 2024-03-28 PROCEDURE — 1126F AMNT PAIN NOTED NONE PRSNT: CPT | Mod: CPTII,S$GLB,, | Performed by: INTERNAL MEDICINE

## 2024-03-28 PROCEDURE — 3080F DIAST BP >= 90 MM HG: CPT | Mod: CPTII,S$GLB,, | Performed by: INTERNAL MEDICINE

## 2024-03-28 PROCEDURE — 1101F PT FALLS ASSESS-DOCD LE1/YR: CPT | Mod: CPTII,S$GLB,, | Performed by: INTERNAL MEDICINE

## 2024-03-28 PROCEDURE — 99999 PR PBB SHADOW E&M-EST. PATIENT-LVL V: CPT | Mod: PBBFAC,,, | Performed by: INTERNAL MEDICINE

## 2024-03-28 PROCEDURE — 1159F MED LIST DOCD IN RCRD: CPT | Mod: CPTII,S$GLB,, | Performed by: INTERNAL MEDICINE

## 2024-03-28 NOTE — PROGRESS NOTES
[facility]     Outpatient Medical Oncology Note  Patient: Kylah Deal  MRN: [id number]  Primary Care Provider: Osmar Cruz MD  Chief Complaint:  Enrrique Hepatic Mass, RML Pulmonary Mass, Adenopathy, and Bone Lesions     Subjective     Subjective:   HPI:   Kylah Deal is a 73 y.o. female with PMH significant for:   - CAD s/p CABG in 2000 and prior PCI (Plavix)  - DM2 (A1c: 6.8%, linagliptin)  - HTN (lisinopril)  - Possible COPD (PFTs - 2/8/24 - FEV1: 50-79%, mild restriction, decreased DLCO<40%)  - HTN  - Possible Seizure Disorder (Keppra)     She is followed by Medical Oncology as recent imaging performed in evaluation of hypercalcemia revealed a Enrrique Hepatic Mass, RML Pulmonary Mass, Adenopathy, and Bone Lesions, with recent CT guided core needle mediastinal node biopsy revealing focal noncaseating granulomatous inflammation w/o malignancy.      Oncology History:  1/2023: vague RUQ abdominal pain prompting a CT A/P - did not show biliary disease but did reveal opacities in right lung   1/2023: noted to be hypercalcemic (13) during routine labs [place] [facility] [place] - hypercalcemia worsened in 1/2024 (14) prompting imaging below  1/13/2024: CT CAP w/ IV contrast: scattered pulmonary nodules - largest is a 4.3 cm Right hilar/RML pulmonary mass w/ occlusion of the RML bronchus, mass in the enrrique hepatis (surrounds the portal vein, CBD dilation, extending into the RP surrounding the aorta/iliac and SMA; tumor does not appear to originate in the pancreas, could be conglomerate node or primary mass, numerous liver lesions c/f metastasis, mesenteric adenopathy, splenic infarcts   1/31/23: Bronchoscopy: PATHOLOGY: RLL lung bx, LLL lung bx, Right mainstem bronchus, BAL, negative for carcinoma or granulomatous disease  2/20/2024: PET/CT: Hypermetabolic infiltrative mass in the enrrique hepatis/RP (superior/posterior to pancreas, SUV: 7.3, surrounding CBD), lungs, right hilum, mediastinum,  pleura, pericardium, liver (small, SUV: 3.4), mesenteric, RP, and pelvic adenopathy, left SC (SUV: 3.7), right neck (SUV: 2.4, inferior to parotid), diffuse interstitial involvement in both lungs (interstitial thickening/nodularity, mildly thickened pleural w/ uptake), bone (right femoral neck, pelvis, spine, scapula, sternum, SUV: 5.8)  3/1/2024: CT guided core needle mediastinal lymph node biopsy - focal noncaseating granulomatous inflammation - results are discordant and remain concerning for malignancy.  3/18/24: CT chest: bilateral perihilar/peribronchial predominant irregular consolidations, stable, findings favored to represent sarcoidosis  3/27/24: Tumor Board: Recommend more tissue (EUS of PH mass vs. Bone) as findings in the lung may represent sarcoidosis but other lesions remain c/f malignancy    Interval History:    Mrs. Deal is overall doing well. She is accompanied today by 2 of her sons. No change in prior sx.     Patient's current symptoms are:  (1) Mild productive cough: chronic x numerous years, unchanged  (2) Fatigue: chronic but progressive over the last year  (3) MAHONEY: new over the last few months, no overt chest pain.   (4) HA: She has occasional new headaches w/o focal neurologic deficits. HA's are mild but regular, no associated visual changes, N/V, etc.  (5) Neck/L shoulder pain - started over the last several months as well, pain is mild, persistent not progressive, intermittent but daily, not currently requiring analgesia. Patient denies trauma, bowel or bladder incontinence, saddle anesthesia, neurologic deficit/weakness - particularly extremity weakness, numbness or tingling. No difficulties with ROM of the shoulder. Patient denies trauma, bowel or bladder incontinence, saddle anesthesia, neurologic deficit/weakness - particularly extremity weakness, numbness or tingling.      Patient otherwise denies fevers, chills, night sweats, chest pain, abdominal pain, N/V, diarrhea, constipation,  weight loss, rashes.     Review of Systems:  14-point review of systems was asked with the pertinent positives and/or negatives stated in HPI.     Past Medical History:   - CAD s/p CABG in 2000 and prior PCI (Plavix), T2DM, HTN (lisinopril), COPD (PFTs - 2/8/24 - FEV1: 50-79%, mild restriction, decreased DLCO<40%), GERD, HLD, HTN, Seizure Disorder (Dx in 2020, Keppra), Osteoporosis (on Prolia)    Past Surgical HIstory:  Cholecystectomy, POLINA, appendectomy, Left hip allison    Family History:   - Dad: Lung Cancer (smoker)  - Sister: Breast Cancer (40s)  - Maternal Grandmother: Colon Cancer (70s)  - Paternal Aunt: Colon (50s)  - Maternal Uncle: Cancer NOS    Social History:   Lives: [place]  Recently   Children: Yes   Tobacco use: Former smoker - smoked from her 30s-50s, half pack per week, quit in 2008  Alcohol use: denies   Illicit drug use: denies    reports that she quit smoking about 16 years ago. Her smoking use included cigarettes. She has never used smokeless tobacco.     Allergies:  Review of patient's allergies indicates:   Allergen Reactions    Atorvastatin calcium Other (See Comments)     Pain    Metformin Diarrhea     Diarrhea and cramping    Rosuvastatin Other (See Comments)     pain    Statins-hmg-coa reductase inhibitors        Medications:  Current Outpatient Medications   Medication Instructions    albuterol (PROVENTIL/VENTOLIN HFA) 90 mcg/actuation inhaler 2 puffs, Inhalation, Every 4 hours PRN    aspirin (ECOTRIN) 81 mg, Oral, Daily    carvediloL (COREG) 12.5 MG tablet 1 tablet, Oral, 2 times daily with meals    cholecalciferol, vitamin D3, (VITAMIN D3) 50 mcg (2,000 unit) Cap capsule 1 tablet, Oral    ciprofloxacin (CIPRO) 500 mg, Oral, 2 times daily    clopidogreL (PLAVIX) 75 mg tablet 1 tablet, Oral, Daily    droNABinol (MARINOL) 2.5 mg, Oral, 2 times daily before meals    evolocumab (REPATHA SURECLICK SUBQ) Subcutaneous    ezetimibe (ZETIA) 10 mg, Oral, Daily    fluticasone (VERAMYST) 27.5  "mcg/actuation nasal spray 2 sprays, Nasal, Daily    furosemide (LASIX) 20 mg, Oral, 2 times daily    guaiFENesin-codeine 100-10 mg/5 ml (TUSSI-ORGANIDIN NR)  mg/5 mL syrup 10 mLs, Oral, Every 4 hours PRN    hydrALAZINE (APRESOLINE) 25 mg, Oral, 3 times daily    insulin aspart U-100 (NOVOLOG) 2 Units, Subcutaneous, As needed (PRN)    levETIRAcetam (KEPPRA) 500 MG Tab     lisinopriL (PRINIVIL,ZESTRIL) 40 MG tablet 1 tablet, Oral, Daily    montelukast (SINGULAIR) 10 mg, Oral    nitroGLYCERIN (NITROSTAT) 0.4 mg, Sublingual    pantoprazole (PROTONIX) 40 mg, Oral, Every morning    PROLIA 60 mg, Subcutaneous, Every 6 months    solifenacin (VESICARE) 10 MG tablet 1 tablet, Oral, Daily    TRADJENTA 5 mg, Oral, Daily    TRELEGY ELLIPTA 200-62.5-25 mcg inhaler 1 puff, Inhalation          Objective:   Vitals:   Vitals:    03/28/24 1549   BP: (!) 197/97   BP Location: Right arm   Patient Position: Sitting   BP Method: Medium (Automatic)   Pulse: 83   Resp: 16   Temp: 97.6 °F (36.4 °C)   TempSrc: Oral   SpO2: 98%   Weight: 46.3 kg (102 lb 1.2 oz)   Height: 5' 5" (1.651 m)     BMI: Body mass index is 16.99 kg/m².  ECOG Performance Status: 1    Physical Exam     General Appearance:    Alert, cooperative, no distress    Head:    Normocephalic, without obvious abnormality, atraumatic   Throat:   Lips, mucosa, and tongue normal; teeth and gums normal   Neck:   Supple, symmetrical, no adenopathy;     thyroid:  no enlargement/tenderness/nodules   Lungs:     Clear to auscultation bilaterally, respirations unlabored    Heart:    Regular rate and rhythm, S1 and S2 normal   Abdomen:     Soft, non-tender, bowel sounds active, no masses, no organomegaly   Extremities:   Extremities normal, atraumatic, no cyanosis or edema   Pulses:   2+ and symmetric all extremities   Skin:   Skin color, texture, turgor normal, no rashes or lesions   Lymph nodes:   Cervical, supraclavicular, and axillary nodes normal   Neurologic:   CNII-XII intact, " normal strength, sensation      Laboratory Data:  Lab Visit on 03/27/2024   Component Date Value Ref Range Status    Prothrombin Time 03/27/2024 10.8  9.0 - 12.5 sec Final    INR 03/27/2024 1.0  0.8 - 1.2 Final    Comment: Coumadin Therapy:  2.0 - 3.0 for INR for all indicators except mechanical heart valves  and antiphospholipid syndromes which should use 2.5 - 3.5.      WBC 03/27/2024 6.19  3.90 - 12.70 K/uL Final    RBC 03/27/2024 3.98 (L)  4.00 - 5.40 M/uL Final    Hemoglobin 03/27/2024 11.0 (L)  12.0 - 16.0 g/dL Final    Hematocrit 03/27/2024 34.7 (L)  37.0 - 48.5 % Final    MCV 03/27/2024 87  82 - 98 fL Final    MCH 03/27/2024 27.6  27.0 - 31.0 pg Final    MCHC 03/27/2024 31.7 (L)  32.0 - 36.0 g/dL Final    RDW 03/27/2024 13.4  11.5 - 14.5 % Final    Platelets 03/27/2024 297  150 - 450 K/uL Final    MPV 03/27/2024 10.7  9.2 - 12.9 fL Final    Immature Granulocytes 03/27/2024 0.3  0.0 - 0.5 % Final    Gran # (ANC) 03/27/2024 3.9  1.8 - 7.7 K/uL Final    Immature Grans (Abs) 03/27/2024 0.02  0.00 - 0.04 K/uL Final    Comment: Mild elevation in immature granulocytes is non specific and   can be seen in a variety of conditions including stress response,   acute inflammation, trauma and pregnancy. Correlation with other   laboratory and clinical findings is essential.      Lymph # 03/27/2024 1.1  1.0 - 4.8 K/uL Final    Mono # 03/27/2024 0.8  0.3 - 1.0 K/uL Final    Eos # 03/27/2024 0.4  0.0 - 0.5 K/uL Final    Baso # 03/27/2024 0.04  0.00 - 0.20 K/uL Final    nRBC 03/27/2024 0  0 /100 WBC Final    Gran % 03/27/2024 63.7  38.0 - 73.0 % Final    Lymph % 03/27/2024 17.3 (L)  18.0 - 48.0 % Final    Mono % 03/27/2024 12.1  4.0 - 15.0 % Final    Eosinophil % 03/27/2024 6.0  0.0 - 8.0 % Final    Basophil % 03/27/2024 0.6  0.0 - 1.9 % Final    Differential Method 03/27/2024 Automated   Final    Sodium 03/27/2024 141  136 - 145 mmol/L Final    Potassium 03/27/2024 4.3  3.5 - 5.1 mmol/L Final    Chloride 03/27/2024 106  95  - 110 mmol/L Final    CO2 2024 28  23 - 29 mmol/L Final    Glucose 2024 210 (H)  70 - 110 mg/dL Final    BUN 2024 19  8 - 23 mg/dL Final    Creatinine 2024 1.2  0.5 - 1.4 mg/dL Final    Calcium 2024 10.7 (H)  8.7 - 10.5 mg/dL Final    Total Protein 2024 7.1  6.0 - 8.4 g/dL Final    Albumin 2024 3.3 (L)  3.5 - 5.2 g/dL Final    Total Bilirubin 2024 0.4  0.1 - 1.0 mg/dL Final    Comment: For infants and newborns, interpretation of results should be based  on gestational age, weight and in agreement with clinical  observations.    Premature Infant recommended reference ranges:  Up to 24 hours.............<8.0 mg/dL  Up to 48 hours............<12.0 mg/dL  3-5 days..................<15.0 mg/dL  6-29 days.................<15.0 mg/dL      Alkaline Phosphatase 2024 69  55 - 135 U/L Final    AST 2024 15  10 - 40 U/L Final    ALT 2024 11  10 - 44 U/L Final    eGFR 2024 47 (A)  >60 mL/min/1.73 m^2 Final    Anion Gap 2024 7 (L)  8 - 16 mmol/L Final    PTH, Intact 2024 <5.0 (L)  9.0 - 77.0 pg/mL Final     Recent Labs   Lab Result Units 24  1106 24  1251   WBC K/uL 6.45 6.19   Hemoglobin g/dL 12.1 11.0*   Hematocrit % 37.3 34.7*   Platelets K/uL 332 297       Recent Labs   Lab Result Units 24  1106 24  1251   Creatinine mg/dL 0.9 1.2   AST U/L 21 15   ALT U/L 20 11       Recent Labs   Lab Result Units 24  1106   Iron ug/dL 46   Ferritin ng/mL 216          Imagin2024: CT CAP w/ IV contrast: scattered pulmonary nodules - largest is a 4.3 cm Right hilar/RML pulmonary mass w/ occlusion of the RML bronchus, mass in the anastacia hepatis (surrounds the portal vein, CBD dilation, extending into the RP surrounding the aorta/iliac and SMA; tumor does not appear to originate in the pancreas, could be conglomerate node or primary mass, numerous liver lesions c/f metastasis, mesenteric adenopathy, splenic infarcts  "    2/20/2024: PET/CT: Hypermetabolic infiltrative mass in the enrrique hepatis/RP (superior/posterior to pancreas, SUV: 7.3, surrounding CBD), lungs, right hilum, mediastinum, pleura, pericardium, liver (small, SUV: 3.4), mesenteric, RP, and pelvic adenopathy, left SC (SUV: 3.7), right neck (SUV: 2.4, inferior to parotid), diffuse interstitial involvement in both lungs (interstitial thickening/nodularity, mildly thickened pleural w/ uptake), bone (right femoral neck, pelvis, spine, scapula, sternum, SUV: 5.8)    3/18/24: CT chest w/o contrast: stable bilateral perihilar/peribronchial predominant irregular consolidations, mediastinal irregular peripheral soft tissue densities, small left pleural effusion, findings favored to represent sarcoidosis    Pathology:   3/1/2024: CT guided core needle mediastinal lymph node biopsy - focal noncaseating granulomatous inflammation, "one of the fragments show a few small non-caseating granulomas, no evidence of malignancy"    Assessment   Assessment and Plan:   Kylah Deal is a 73 y.o. female former smoker with CAD s/p CABG, DM2, HTN, possible COPD (PFTs - 2/8/24 - FEV1: 50-79%, mild restriction, decreased DLCO<40%), HTN, Possible Seizure Disorder (Keppra). She is followed by Medical Oncology as imaging performed in evaluation of hypercalcemia revealed a Enrrique Hepatic Mass, RML Pulmonary Mass, Adenopathy, and Bone Lesions, with CT guided core needle mediastinal node biopsy on 3/1/24 revealing focal noncaseating granulomatous inflammation c/f sarcoidosis, but imaging results discordant. She presents today to review tumor board discussion, follow up labs, and imaging.     # RML Pulmonary Mass  # Enrrique Hepatic Mass  # Adenopathy  # Bone Lesions   - Reviewed outside CT, [place] [facility] [place] chest from 3/18/24 showing a enrrique hepatic mass, RML pulmonary mass with occlusion of the RML bronchus, scattered adenopathy, and bone lesions, all of which are " hypermetabolic on PET. CT guided core needle mediastinal node biopsy on 3/1/2024 revealing a small fragment of noncaseating granulomas w/o overt malignancy. She has also had a bronch in January at [facility] [place] w/ negative results.   - Reviewed at tumor board. Recommend more tissue (PH mass vs. Bone) as findings in the lung may represent sarcoidosis but other lesions remain c/f malignancy; will start with PH mass as molecular profiling analysis tends to be more reliable on tissue over bone. Biopsy requested ASAP (Dr. Farooq west).     # Other Co-Morbidities:  - Bone lesions: MRI neck/shoulder on 3/27 revealing lesions in cervical, upper thoracic, and right medial clavicle (no extraosseous extension or pathologic fracture), pain is mild, pending above  - Hypercalcemia: likely related to either sarcoidosis or malignancy as above, s/p denosumab in 2024, CTM  - IgM monoclonal protein: SPEP: M protein: 0.21 g/dL, IgM lambda monoclonal band, KF.36, ratio: 1.13 - review w/ hematology regarding bone marrow biopsy in light of bone lesions on imaging and m protein   - Left shoulder pain: MRI shoulder on 3/27 revealing intramedullary lesions at the base of the acromion - sequela of skeletal sarcoidosis vs. metastatic disease not excluded, other: supraspinatus tendinosis, posterosuperior labral tear, AC joint arthrosis, subacromial/subdeltoid bursitis - orthopedics referral  - CAD s/p CABG (Plavix), DM2 (linagliptin), HTN (lisinopril), COPD (PFTs - 24 - FEV1: 50-79%, mild restriction, decreased DLCO<40%), HTN, Possible Seizure Disorder (Keppra): stable on current regimen, per PCP (outside of [facility])  - Genetics: pending above, consider genetic testing given strong FH  - Other Cancer Screening: Colonoscopy: 3/22/2019, [id number], Pap: POLINA in 1985    Follow Up:  - Labs: CBC, CMP on day of EUS  - RTC after biopsy (currently scheduled on )  [] liquid biopsy, genetics, prolia, orthopedics     MDM includes:    - Acute or  chronic illness or injury that poses a threat to life or bodily function  - Consideration and discussion of significant complications based on comorbidities  - Review of prior external notes from unique source and review of diagnostic tests and information  - Independent review and explanation of 3+ results from unique tests   - Discussion of management and ordering 3+ unique tests   - Extensive discussion of treatment and management including consideration of possible diagnoses and management options  - Prescription drug management  - Drug therapy requiring intensive monitoring for toxicity    - Patient seen in diagnosis clinic.   - Overall, I discussed the diagnosis, history, stage, labs/imaging, prognosis, management, and treatment plan as applicable. I reviewed adverse short and long term effects as applicable.   - Informed patient if symptoms are getting worse that it is their responsibility to call the clinic and schedule follow up sooner than stated follow up. Also informed patient if they do not hear from the appointment center in 2-5 business days for their referrals, the patient must call the Oncology clinic so we can follow up on procedures or referral scheduling. Patient was fully informed of current medical plan, all questions were answered and patient verbalized understanding. No further questions.   - Face to Face Visit time I spent with the patient: 40 minutes of total time spent on the encounter, including counseling patient and/or coordinating care, which includes face to face time and non-face to face time preparing to see the patient (eg, review of tests), Obtaining and/or reviewing separately obtained history, Documenting clinical information in the electronic or other health record, Independently interpreting results (not separately reported) and communicating results to the patient/family/caregiver, or Care coordination (not separately reported).     Signed   Stephanie A. Cimo, MD [facility]  Medical Oncology

## 2024-03-28 NOTE — Clinical Note
Jesse Gu Thanks for your help on mutual pt Mrs. Deal. I ended up reviewing her case at tumor board and recommendation is actually to biopsy the anastacia hepatic mass via EUS as findings in lung may be sarcoid but other lesions may be cancer.   If PH results are inconclusive, we still may need to go after one of the bone lesions. Dr. Hagen had previously mentioned to me that the sternal lesion was accessible. GI should be able to do the biopsy next week (I hope). I know she's scheduled for right lung mass biopsy on 4/5 with you all.   Can we cancel the lung biopsy and reschedule it as an IR guided bx of the sternal lesion to the week of 4/15, and can cancel it pending the results of the PH biopsy? I just was hoping to have something scheduled it case we need it and the pt is rightfully so anxious to get a final diagnosis  Thanks! -Shanthi

## 2024-04-01 ENCOUNTER — TELEPHONE (OUTPATIENT)
Dept: ENDOSCOPY | Facility: HOSPITAL | Age: 74
End: 2024-04-01
Payer: MEDICARE

## 2024-04-01 DIAGNOSIS — R93.89 ABNORMAL FINDING ON IMAGING: Primary | ICD-10-CM

## 2024-04-01 LAB
DNA RANGE(S) EXAMINED NAR: NORMAL
GENE DIS ANL INTERP-IMP: NORMAL
GENE DIS ASSESSED: NORMAL
MSI CA SPEC-IMP: NOT DETECTED
REASON FOR STUDY: NORMAL
TEMPUS LCA: NORMAL
TEMPUS PORTAL: NORMAL

## 2024-04-01 NOTE — TELEPHONE ENCOUNTER
Spoke to patient to schedule procedure(s) Upper Endoscopy Ultrasound (EUS)       Physician to perform procedure(s) Dr. ALFONSO Lomax  Date of Procedure (s) 04/03/2024  Arrival Time 10:00 AM  Time of Procedure(s) 11:00 AM   Location of Procedure(s) [facility]nd Floor  Type of Rx Prep sent to patient: Other  Instructions provided to patient via [facility]    Patient was informed on the following information and verbalized understanding. Screening questionnaire reviewed with patient and complete. If procedure requires anesthesia, a responsible adult needs to be present to accompany the patient home, patient cannot drive after receiving anesthesia. Appointment details are tentative, especially check-in time. Patient will receive a prep-op call 7 days prior to confirm check-in time for procedure. If applicable the patient should contact their pharmacy to verify Rx for procedure prep is ready for pick-up. Patient was advised to call the scheduling department at [phone] if pharmacy states no Rx is available. Patient was advised to call the endoscopy scheduling department if any questions or concerns arise.      [facility] Scheduling Department

## 2024-04-02 DIAGNOSIS — E11.9 TYPE 2 DIABETES MELLITUS WITHOUT COMPLICATION, UNSPECIFIED WHETHER LONG TERM INSULIN USE: ICD-10-CM

## 2024-04-03 ENCOUNTER — ANESTHESIA (OUTPATIENT)
Dept: ENDOSCOPY | Facility: HOSPITAL | Age: 74
End: 2024-04-03
Payer: MEDICARE

## 2024-04-03 ENCOUNTER — HOSPITAL ENCOUNTER (OUTPATIENT)
Facility: HOSPITAL | Age: 74
Discharge: HOME | End: 2024-04-03
Attending: INTERNAL MEDICINE | Admitting: INTERNAL MEDICINE
Payer: MEDICARE

## 2024-04-03 ENCOUNTER — ANESTHESIA EVENT (OUTPATIENT)
Dept: ENDOSCOPY | Facility: HOSPITAL | Age: 74
End: 2024-04-03
Payer: MEDICARE

## 2024-04-03 VITALS
WEIGHT: 100 LBS | OXYGEN SATURATION: 100 % | HEIGHT: 61 IN | RESPIRATION RATE: 20 BRPM | SYSTOLIC BLOOD PRESSURE: 177 MMHG | HEART RATE: 71 BPM | DIASTOLIC BLOOD PRESSURE: 73 MMHG | BODY MASS INDEX: 18.88 KG/M2 | TEMPERATURE: 98 F

## 2024-04-03 DIAGNOSIS — R93.89 ABNORMAL CT OF THE CHEST: Primary | ICD-10-CM

## 2024-04-03 DIAGNOSIS — E11.9 TYPE 2 DIABETES MELLITUS WITHOUT COMPLICATION, UNSPECIFIED WHETHER LONG TERM INSULIN USE: ICD-10-CM

## 2024-04-03 DIAGNOSIS — E11.9 TYPE 2 DIABETES MELLITUS WITHOUT COMPLICATION: ICD-10-CM

## 2024-04-03 DIAGNOSIS — Z13.9 SCREENING DUE: ICD-10-CM

## 2024-04-03 LAB — POCT GLUCOSE: 159 MG/DL (ref 70–110)

## 2024-04-03 PROCEDURE — 25000003 PHARM REV CODE 250: Performed by: NURSE ANESTHETIST, CERTIFIED REGISTERED

## 2024-04-03 PROCEDURE — 27202059 HC NEEDLE, FNA (ANY): Performed by: INTERNAL MEDICINE

## 2024-04-03 PROCEDURE — 88177 CYTP FNA EVAL EA ADDL: CPT | Mod: 59 | Performed by: PATHOLOGY

## 2024-04-03 PROCEDURE — 88342 IMHCHEM/IMCYTCHM 1ST ANTB: CPT | Mod: 26,,, | Performed by: PATHOLOGY

## 2024-04-03 PROCEDURE — 63600175 PHARM REV CODE 636 W HCPCS: Performed by: NURSE ANESTHETIST, CERTIFIED REGISTERED

## 2024-04-03 PROCEDURE — 37000008 HC ANESTHESIA 1ST 15 MINUTES: Performed by: INTERNAL MEDICINE

## 2024-04-03 PROCEDURE — D9220A PRA ANESTHESIA: Mod: ANES,,, | Performed by: ANESTHESIOLOGY

## 2024-04-03 PROCEDURE — 43242 EGD US FINE NEEDLE BX/ASPIR: CPT | Performed by: INTERNAL MEDICINE

## 2024-04-03 PROCEDURE — D9220A PRA ANESTHESIA: Mod: CRNA,,, | Performed by: NURSE ANESTHETIST, CERTIFIED REGISTERED

## 2024-04-03 PROCEDURE — 88341 IMHCHEM/IMCYTCHM EA ADD ANTB: CPT | Mod: 59 | Performed by: PATHOLOGY

## 2024-04-03 PROCEDURE — 88173 CYTOPATH EVAL FNA REPORT: CPT | Mod: 26,,, | Performed by: PATHOLOGY

## 2024-04-03 PROCEDURE — 88173 CYTOPATH EVAL FNA REPORT: CPT | Mod: 59 | Performed by: PATHOLOGY

## 2024-04-03 PROCEDURE — 88305 TISSUE EXAM BY PATHOLOGIST: CPT | Mod: 26,,, | Performed by: PATHOLOGY

## 2024-04-03 PROCEDURE — 37000009 HC ANESTHESIA EA ADD 15 MINS: Performed by: INTERNAL MEDICINE

## 2024-04-03 PROCEDURE — 88342 IMHCHEM/IMCYTCHM 1ST ANTB: CPT | Performed by: PATHOLOGY

## 2024-04-03 PROCEDURE — 25000003 PHARM REV CODE 250: Performed by: INTERNAL MEDICINE

## 2024-04-03 PROCEDURE — 27202131 HC NEEDLE, FNB SINGLE (ANY): Performed by: INTERNAL MEDICINE

## 2024-04-03 PROCEDURE — 88177 CYTP FNA EVAL EA ADDL: CPT | Mod: 26,,, | Performed by: PATHOLOGY

## 2024-04-03 PROCEDURE — 88341 IMHCHEM/IMCYTCHM EA ADD ANTB: CPT | Mod: 26,,, | Performed by: PATHOLOGY

## 2024-04-03 PROCEDURE — 43242 EGD US FINE NEEDLE BX/ASPIR: CPT | Mod: ,,, | Performed by: INTERNAL MEDICINE

## 2024-04-03 PROCEDURE — 88172 CYTP DX EVAL FNA 1ST EA SITE: CPT | Performed by: PATHOLOGY

## 2024-04-03 PROCEDURE — 88172 CYTP DX EVAL FNA 1ST EA SITE: CPT | Mod: 26,,, | Performed by: PATHOLOGY

## 2024-04-03 PROCEDURE — 88305 TISSUE EXAM BY PATHOLOGIST: CPT | Performed by: PATHOLOGY

## 2024-04-03 RX ORDER — LIDOCAINE HYDROCHLORIDE 10 MG/ML
INJECTION, SOLUTION INTRAVENOUS
Status: DISCONTINUED | OUTPATIENT
Start: 2024-04-03 | End: 2024-04-03

## 2024-04-03 RX ORDER — SODIUM CHLORIDE 9 MG/ML
INJECTION, SOLUTION INTRAVENOUS CONTINUOUS
Status: DISCONTINUED | OUTPATIENT
Start: 2024-04-03 | End: 2024-04-03 | Stop reason: HOSPADM

## 2024-04-03 RX ORDER — PROPOFOL 10 MG/ML
VIAL (ML) INTRAVENOUS
Status: DISCONTINUED | OUTPATIENT
Start: 2024-04-03 | End: 2024-04-03

## 2024-04-03 RX ORDER — SODIUM CHLORIDE 0.9 % (FLUSH) 0.9 %
10 SYRINGE (ML) INJECTION
Status: DISCONTINUED | OUTPATIENT
Start: 2024-04-03 | End: 2024-04-03 | Stop reason: HOSPADM

## 2024-04-03 RX ADMIN — LIDOCAINE HYDROCHLORIDE 50 MG: 10 INJECTION, SOLUTION INTRAVENOUS at 10:04

## 2024-04-03 RX ADMIN — PROPOFOL 30 MG: 10 INJECTION, EMULSION INTRAVENOUS at 10:04

## 2024-04-03 RX ADMIN — SODIUM CHLORIDE: 9 INJECTION, SOLUTION INTRAVENOUS at 10:04

## 2024-04-03 RX ADMIN — SODIUM CHLORIDE: 0.9 INJECTION, SOLUTION INTRAVENOUS at 10:04

## 2024-04-03 NOTE — ANESTHESIA POSTPROCEDURE EVALUATION
Anesthesia Post Evaluation    Patient: Kylah Deal    Procedure(s) Performed: Procedure(s) (LRB):  ULTRASOUND, UPPER GI TRACT, ENDOSCOPIC (N/A)    Final Anesthesia Type: general      Patient location during evaluation: PACU  Patient participation: Yes- Able to Participate  Level of consciousness: awake and alert  Post-procedure vital signs: reviewed and stable  Pain management: adequate  Airway patency: patent    PONV status at discharge: No PONV  Anesthetic complications: no      Cardiovascular status: stable  Respiratory status: spontaneous ventilation  Hydration status: euvolemic  Follow-up not needed.              Vitals Value Taken Time   BP 97/51 04/03/24 1135   Temp 36.4 °C (97.5 °F) 04/03/24 1135   Pulse 71 04/03/24 1135   Resp 28 04/03/24 1135   SpO2 99 % 04/03/24 1135         No case tracking events are documented in the log.      Pain/Tee Score: No data recorded

## 2024-04-03 NOTE — PROGRESS NOTES
Chart reviewed. Attempted to meet with pt this AM to complete assessment. Therapy at bedside and pt about to be transferred [facility] for Echo. Will re-attempt visit this afternoon and continue to follow.     BOLIVAR Elam   Care Manager, [facility]  [phone] Sent down with pathologists

## 2024-04-03 NOTE — ANESTHESIA PREPROCEDURE EVALUATION
04/03/2024  Kylah Deal is a 73 y.o., female.      Pre-op Assessment    I have reviewed the Patient Summary Reports.     I have reviewed the Nursing Notes. I have reviewed the NPO Status.   I have reviewed the Medications.     Review of Systems  Anesthesia Hx:             Denies Family Hx of Anesthesia complications.    Denies Personal Hx of Anesthesia complications.                    Cardiovascular:     Hypertension                                        Endocrine:  Diabetes               Physical Exam    Airway:  Mallampati: II         Anesthesia Plan  Type of Anesthesia, risks & benefits discussed:    Anesthesia Type: Gen Natural Airway  Intra-op Monitoring Plan: Standard ASA Monitors  Post Op Pain Control Plan: multimodal analgesia  Induction:  IV  Informed Consent: Informed consent signed with the Patient and all parties understand the risks and agree with anesthesia plan.  All questions answered.   ASA Score: 3  Day of Surgery Review of History & Physical: H&P Update referred to the surgeon/provider.    Ready For Surgery From Anesthesia Perspective.     .

## 2024-04-03 NOTE — H&P
Short Stay Endoscopy History and Physical    PCP - Osmar Cruz MD  Referring Physician - Karla Sanchez MD  [address] [place] [address]    Procedure - eus  ASA - per anesthesia  Mallampati - per anesthesia  History of Anesthesia problems - no  Family history Anesthesia problems -  no   Plan of anesthesia - General    HPI:  This is a 73 y.o. female here for evaluation of: abnormal image    Reflux - no  Dysphagia - no  Abdominal pain - no  Diarrhea - no    ROS:  Constitutional: No fevers, chills, No weight loss  CV: No chest pain  Pulm: No cough, No shortness of breath  Ophtho: No vision changes  GI: see HPI  Derm: No rash    Medical History:  has a past medical history of CAD (coronary artery disease), Diabetes mellitus, Hypertension, Migraine, and Seizures.    Surgical History:  has a past surgical history that includes stent placement/prior to omega (2017); Coronary artery bypass graft; Cholecystectomy; Wrist surgery (Right, 2005); Hip fracture surgery (Left, 2018); Urethral sling (2022); Excision heel spur excision (1997); Hysterectomy; Appendectomy; and Tonsillectomy.    Family History: family history includes Breast cancer in her sister; Cancer in her maternal grandmother, maternal uncle, and paternal aunt; Lung cancer in her father..    Social History:  reports that she quit smoking about 16 years ago. Her smoking use included cigarettes. She has never used smokeless tobacco. She reports current alcohol use of about 1.0 standard drink of alcohol per week. She reports that she does not use drugs.    Review of patient's allergies indicates:   Allergen Reactions    Atorvastatin calcium Other (See Comments)     Pain    Metformin Diarrhea     Diarrhea and cramping    Rosuvastatin Other (See Comments)     pain    Statins-hmg-coa reductase inhibitors        Medications:   Medications Prior to Admission   Medication Sig Dispense Refill Last Dose    albuterol (PROVENTIL/VENTOLIN HFA) 90  mcg/actuation inhaler Inhale 2 puffs into the lungs every 4 (four) hours as needed.   4/2/2024    aspirin (ECOTRIN) 81 MG EC tablet Take 81 mg by mouth once daily.   Past Week    carvediloL (COREG) 12.5 MG tablet Take 1 tablet by mouth 2 (two) times daily with meals.   4/3/2024    cholecalciferol, vitamin D3, (VITAMIN D3) 50 mcg (2,000 unit) Cap capsule Take 1 tablet by mouth.   4/2/2024    ciprofloxacin (CIPRO) 500 mg/5 mL suspension Take 500 mg by mouth 2 (two) times daily.   Past Week    droNABinol (MARINOL) 2.5 MG capsule Take 2.5 mg by mouth 2 (two) times daily before meals.   Past Week    evolocumab (REPATHA SURECLICK SUBQ) Inject into the skin.   Past Month    ezetimibe (ZETIA) 10 mg tablet Take 10 mg by mouth once daily.   4/2/2024    fluticasone (VERAMYST) 27.5 mcg/actuation nasal spray 2 sprays by Nasal route once daily.   Past Week    hydrALAZINE (APRESOLINE) 25 MG tablet Take 25 mg by mouth 3 (three) times daily.   4/3/2024    insulin aspart U-100 (NOVOLOG) 100 unit/mL (3 mL) InPn pen Inject 2 Units into the skin as needed.   4/2/2024    levETIRAcetam (KEPPRA) 500 MG Tab    4/3/2024    linaGLIPtin (TRADJENTA) 5 mg Tab tablet Take 5 mg by mouth once daily.   4/2/2024    lisinopriL (PRINIVIL,ZESTRIL) 40 MG tablet Take 1 tablet by mouth once daily.   4/3/2024    montelukast (SINGULAIR) 10 mg tablet Take 10 mg by mouth.   4/2/2024    pantoprazole (PROTONIX) 40 MG tablet Take 40 mg by mouth every morning.   4/3/2024    solifenacin (VESICARE) 10 MG tablet Take 1 tablet by mouth once daily.   4/2/2024    TRELEGY ELLIPTA 200-62.5-25 mcg inhaler Inhale 1 puff into the lungs.   4/2/2024    clopidogreL (PLAVIX) 75 mg tablet Take 1 tablet by mouth once daily.   3/27/2024    denosumab (PROLIA) 60 mg/mL Syrg Inject 60 mg into the skin every 6 (six) months.   More than a month    furosemide (LASIX) 20 MG tablet Take 20 mg by mouth 2 (two) times daily.   More than a month    guaiFENesin-codeine 100-10 mg/5 ml  (TUSSI-ORGANIDIN NR)  mg/5 mL syrup Take 10 mLs by mouth every 4 (four) hours as needed.   More than a month    nitroGLYCERIN (NITROSTAT) 0.4 MG SL tablet Place 0.4 mg under the tongue.   More than a month       Physical Exam:    Vital Signs:   Vitals:    04/03/24 1019   BP: (!) 174/77   Pulse: 74   Resp: 18   Temp: 97.7 °F (36.5 °C)       General Appearance: Well appearing in no acute distress    Labs:  Lab Results   Component Value Date    WBC 6.19 03/27/2024    HGB 11.0 (L) 03/27/2024    HCT 34.7 (L) 03/27/2024     03/27/2024    ALT 11 03/27/2024    AST 15 03/27/2024     03/27/2024    K 4.3 03/27/2024     03/27/2024    CREATININE 1.2 03/27/2024    BUN 19 03/27/2024    CO2 28 03/27/2024    INR 1.0 03/27/2024    HGBA1C 7.4 (H) 12/21/2021       I have explained the risks and benefits of this endoscopic procedure to the patient including but not limited to bleeding, inflammation, infection, perforation, and death.      Lavell Lomax MD

## 2024-04-03 NOTE — [employer] PATIENT INSTRUCTIONS
Discharge Summary/Instructions after an Endoscopic Procedure  Patient Name: Kylah Deal  Patient MRN: [id number]  Patient YOB: 1950  Wednesday, April 3, 2024  Lavell Lomax MD  Dear patient,  As a result of recent federal legislation (The Federal Cures Act), you may   receive lab or pathology results from your procedure in your [facility]   account before your physician is able to contact you. Your physician or   their representative will relay the results to you with their   recommendations at their soonest availability.  Thank you,  Your health is very important to us during the Covid Crisis. Following your   procedure today, you will receive a daily text for 2 weeks asking about   signs or symptoms of Covid 19.  Please respond to this text when you   receive it so we can follow up and keep you as safe as possible.   RESTRICTIONS:  During your procedure today, you received medications for sedation.  These   medications may affect your judgment, balance and coordination.  Therefore,   for 24 hours, you have the following restrictions:   - DO NOT drive a car, operate machinery, make legal/financial decisions,   sign important papers or drink alcohol.    ACTIVITY:  Today: no heavy lifting, straining or running due to procedural   sedation/anesthesia.  The following day: return to full activity including work.  DIET:  Eat and drink normally unless instructed otherwise.     TREATMENT FOR COMMON SIDE EFFECTS:  - Mild abdominal pain, nausea, belching, bloating or excessive gas:  rest,   eat lightly and use a heating pad.  - Sore Throat: treat with throat lozenges and/or gargle with warm salt   water.  - Because air was used during the procedure, expelling large amounts of air   from your rectum or belching is normal.  - If a bowel prep was taken, you may not have a bowel movement for 1-3 days.    This is normal.  SYMPTOMS TO WATCH FOR AND REPORT TO YOUR PHYSICIAN:  1. Abdominal pain or bloating, other than  gas cramps.  2. Chest pain.  3. Back pain.  4. Signs of infection such as: chills or fever occurring within 24 hours   after the procedure.  5. Rectal bleeding, which would show as bright red, maroon, or black stools.   (A tablespoon of blood from the rectum is not serious, especially if   hemorrhoids are present.)  6. Vomiting.  7. Weakness or dizziness.  GO DIRECTLY TO THE NEAREST EMERGENCY ROOM IF YOU HAVE ANY OF THE FOLLOWING:      Difficulty breathing              Chills and/or fever over 101 F   Persistent vomiting and/or vomiting blood   Severe abdominal pain   Severe chest pain   Black, tarry stools   Bleeding- more than one tablespoon   Any other symptom or condition that you feel may need urgent attention  Your doctor recommends these additional instructions:  If any biopsies were taken, your doctors clinic will contact you in 1 to 2   weeks with any results.  - Discharge patient to home.   - Resume previous diet.   - Await cytology results.   - Return to referring physician as previously scheduled.  For questions, problems or results please call your physician - Lavell Lomax MD.  EMERGENCY PHONE NUMBER: [phone],  LAB RESULTS: [phone]  IF A COMPLICATION OR EMERGENCY SITUATION ARISES AND YOU ARE UNABLE TO REACH   YOUR PHYSICIAN - GO DIRECTLY TO THE EMERGENCY ROOM.  Lavell Lomax MD  4/3/2024 11:35:14 AM  This report has been verified and signed electronically.  Dear patient,  As a result of recent federal legislation (The Federal Cures Act), you may   receive lab or pathology results from your procedure in your [facility]   account before your physician is able to contact you. Your physician or   their representative will relay the results to you with their   recommendations at their soonest availability.  Thank you,  [employer]

## 2024-04-03 NOTE — TRANSFER OF CARE
"Anesthesia Transfer of Care Note    Patient: Kylah Deal    Procedure(s) Performed: Procedure(s) (LRB):  ULTRASOUND, UPPER GI TRACT, ENDOSCOPIC (N/A)    Patient location: PACU    Anesthesia Type: general    Transport from OR: Transported from OR on room air with adequate spontaneous ventilation    Post pain: adequate analgesia    Post assessment: no apparent anesthetic complications    Post vital signs: stable    Level of consciousness: awake and alert    Nausea/Vomiting: no nausea/vomiting    Complications: none    Transfer of care protocol was followed    Last vitals: Visit Vitals  BP (!) 174/77 (BP Location: Left arm, Patient Position: Lying)   Pulse 74   Temp 36.5 °C (97.7 °F) (Skin)   Resp 18   Ht 5' 1" (1.549 m)   Wt 45.4 kg (100 lb)   LMP  (LMP Unknown)   SpO2 98%   Breastfeeding No   BMI 18.89 kg/m²     "

## 2024-04-09 ENCOUNTER — PATIENT MESSAGE (OUTPATIENT)
Dept: HEMATOLOGY/ONCOLOGY | Facility: CLINIC | Age: 74
End: 2024-04-09
Payer: MEDICARE

## 2024-04-10 DIAGNOSIS — E11.9 TYPE 2 DIABETES MELLITUS WITHOUT COMPLICATION: ICD-10-CM

## 2024-04-10 DIAGNOSIS — E11.9 TYPE 2 DIABETES MELLITUS WITHOUT COMPLICATION, UNSPECIFIED WHETHER LONG TERM INSULIN USE: ICD-10-CM

## 2024-04-10 LAB
ADEQUACY: NORMAL
FINAL PATHOLOGIC DIAGNOSIS: NORMAL
Lab: NORMAL

## 2024-04-12 ENCOUNTER — OFFICE VISIT (OUTPATIENT)
Dept: HEMATOLOGY/ONCOLOGY | Facility: CLINIC | Age: 74
End: 2024-04-12
Payer: MEDICARE

## 2024-04-12 ENCOUNTER — TELEPHONE (OUTPATIENT)
Dept: HEMATOLOGY/ONCOLOGY | Facility: CLINIC | Age: 74
End: 2024-04-12
Payer: MEDICARE

## 2024-04-12 VITALS
OXYGEN SATURATION: 98 % | TEMPERATURE: 98 F | RESPIRATION RATE: 18 BRPM | WEIGHT: 100.5 LBS | SYSTOLIC BLOOD PRESSURE: 100 MMHG | HEIGHT: 61 IN | BODY MASS INDEX: 18.98 KG/M2 | DIASTOLIC BLOOD PRESSURE: 88 MMHG | HEART RATE: 74 BPM

## 2024-04-12 DIAGNOSIS — D72.0 GENETIC ANOMALIES OF LEUKOCYTES: ICD-10-CM

## 2024-04-12 DIAGNOSIS — M89.9 LYTIC BONE LESIONS ON XRAY: Primary | ICD-10-CM

## 2024-04-12 DIAGNOSIS — R91.8 LUNG MASS: ICD-10-CM

## 2024-04-12 DIAGNOSIS — R59.9 ADENOPATHY: ICD-10-CM

## 2024-04-12 DIAGNOSIS — E83.52 HYPERCALCEMIA: ICD-10-CM

## 2024-04-12 PROCEDURE — 3074F SYST BP LT 130 MM HG: CPT | Mod: CPTII,S$GLB,, | Performed by: INTERNAL MEDICINE

## 2024-04-12 PROCEDURE — 99214 OFFICE O/P EST MOD 30 MIN: CPT | Mod: S$GLB,,, | Performed by: INTERNAL MEDICINE

## 2024-04-12 PROCEDURE — 1159F MED LIST DOCD IN RCRD: CPT | Mod: CPTII,S$GLB,, | Performed by: INTERNAL MEDICINE

## 2024-04-12 PROCEDURE — 3079F DIAST BP 80-89 MM HG: CPT | Mod: CPTII,S$GLB,, | Performed by: INTERNAL MEDICINE

## 2024-04-12 PROCEDURE — 3288F FALL RISK ASSESSMENT DOCD: CPT | Mod: CPTII,S$GLB,, | Performed by: INTERNAL MEDICINE

## 2024-04-12 PROCEDURE — 1101F PT FALLS ASSESS-DOCD LE1/YR: CPT | Mod: CPTII,S$GLB,, | Performed by: INTERNAL MEDICINE

## 2024-04-12 PROCEDURE — 99999 PR PBB SHADOW E&M-EST. PATIENT-LVL V: CPT | Mod: PBBFAC,,, | Performed by: INTERNAL MEDICINE

## 2024-04-12 PROCEDURE — 1126F AMNT PAIN NOTED NONE PRSNT: CPT | Mod: CPTII,S$GLB,, | Performed by: INTERNAL MEDICINE

## 2024-04-12 PROCEDURE — 1160F RVW MEDS BY RX/DR IN RCRD: CPT | Mod: CPTII,S$GLB,, | Performed by: INTERNAL MEDICINE

## 2024-04-12 NOTE — PROGRESS NOTES
[facility]     Outpatient Medical Oncology Note  Patient: Kylah Deal  MRN: [id number]  Primary Care Provider: Osmar Cruz MD  Chief Complaint:  Enrrique Hepatic Mass, RML Pulmonary Mass, Adenopathy, and Bone Lesions     Subjective     Subjective:   HPI:   Kylah Deal is a 73 y.o. female with PMH significant for:   - CAD s/p CABG in 2000 and prior PCI (Plavix)  - DM2 (A1c: 6.8%, linagliptin)  - HTN (lisinopril)  - Possible COPD (PFTs - 2/8/24 - FEV1: 50-79%, mild restriction, decreased DLCO<40%)  - HTN  - Possible Seizure Disorder (Keppra)     She is followed by Medical Oncology as recent imaging performed in evaluation of hypercalcemia revealed a Enrrique Hepatic Mass, RML Pulmonary Mass, Adenopathy, and Bone Lesions, with recent CT guided core needle mediastinal node biopsy revealing focal noncaseating granulomatous inflammation w/o malignancy.  Given continued radiographic concern for malignancy, EUS guided PH mass biopsy was performed on 4/3, she presents today to review results.     Oncology History:  1/2023: vague RUQ abdominal pain prompting a CT A/P - did not show biliary disease but did reveal opacities in right lung   1/2023: noted to be hypercalcemic (13) during routine labs through [facility] - hypercalcemia worsened in 1/2024 (14) prompting imaging below  1/13/2024: CT CAP w/ IV contrast: scattered pulmonary nodules - largest is a 4.3 cm Right hilar/RML pulmonary mass w/ occlusion of the RML bronchus, mass in the enrrique hepatis (surrounds the portal vein, CBD dilation, extending into the RP surrounding the aorta/iliac and SMA; tumor does not appear to originate in the pancreas, could be conglomerate node or primary mass, numerous liver lesions c/f metastasis, mesenteric adenopathy, splenic infarcts   1/31/23: Bronchoscopy: PATHOLOGY: RLL lung bx, LLL lung bx, Right mainstem bronchus, BAL, negative for carcinoma or granulomatous disease  2/20/2024: PET/CT: Hypermetabolic  infiltrative mass in the anastacia hepatis/RP (superior/posterior to pancreas, SUV: 7.3, surrounding CBD), lungs, right hilum, mediastinum, pleura, pericardium, liver (small, SUV: 3.4), mesenteric, RP, and pelvic adenopathy, left SC (SUV: 3.7), right neck (SUV: 2.4, inferior to parotid), diffuse interstitial involvement in both lungs (interstitial thickening/nodularity, mildly thickened pleural w/ uptake), bone (right femoral neck, pelvis, spine, scapula, sternum, SUV: 5.8)  3/1/2024: CT guided core needle mediastinal lymph node biopsy - focal noncaseating granulomatous inflammation - results are discordant and remain concerning for malignancy.  3/18/24: CT chest: bilateral perihilar/peribronchial predominant irregular consolidations, stable, findings favored to represent sarcoidosis  3/27/24: Tumor Board: Recommend more tissue (EUS of PH mass vs. Bone) as findings in the lung may represent sarcoidosis but other lesions remain c/f malignancy  4/3/24: EUS: numerous enlarged nodes in middle paraesophageal mediastinum (8M), gastrohepatic ligament (18), and peripancreatic region - largest 1.3 cm, FNA performed (2 passes with 22 gauge needle); 3 cm mass in area of celiac/SMA takeoff (involved with arterial walls) - FNA performed of this mass as well  - no pathology in esophagus, stomach, duodenum, biliary tree, pancreas  PATHOLOGY: reactive histiocytic/myofibroblastic tissue with rare granulomas, no malignancy     Interval History:    Mrs. Deal is overall doing well. She is accompanied today by 2 of her sons. No change in prior sx.     Patient's current symptoms are:  (1) Mild productive cough: chronic x numerous years, unchanged  (2) Fatigue: chronic but progressive over the last year  (3) MAHONEY: new over the last few months, no overt chest pain.   (4) HA: She has occasional new headaches w/o focal neurologic deficits. HA's are mild but regular, no associated visual changes, N/V, etc.  (5) Neck/L shoulder pain - started  around Jan 2024 pain is mild, persistent not progressive, intermittent but daily, not currently requiring analgesia. Patient denies trauma, bowel or bladder incontinence, saddle anesthesia, neurologic deficit/weakness - particularly extremity weakness, numbness or tingling. No difficulties with ROM of the shoulder. This has spontaneously resolved since last visit.     Patient otherwise denies fevers, chills, night sweats, chest pain, abdominal pain, N/V, diarrhea, constipation, weight loss, rashes.     Review of Systems:  14-point review of systems was asked with the pertinent positives and/or negatives stated in HPI.     Past Medical History:   - CAD s/p CABG in 2000 and prior PCI (Plavix), T2DM, HTN (lisinopril), COPD (PFTs - 2/8/24 - FEV1: 50-79%, mild restriction, decreased DLCO<40%), GERD, HLD, HTN, Seizure Disorder (Dx in 2020, Keppra), Osteoporosis (on Prolia)    Past Surgical HIstory:  Cholecystectomy, POLINA, appendectomy, Left hip allison    Family History:   - Dad: Lung Cancer (smoker)  - Sister: Breast Cancer (40s)  - Maternal Grandmother: Colon Cancer (70s)  - Paternal Aunt: Colon (50s)  - Maternal Uncle: Cancer NOS    Social History:   Lives: [place]  Recently   Children: Yes   Tobacco use: Former smoker - smoked from her 30s-50s, half pack per week, quit in 2008  Alcohol use: denies   Illicit drug use: denies    reports that she quit smoking about 16 years ago. Her smoking use included cigarettes. She has never used smokeless tobacco. She reports current alcohol use of about 1.0 standard drink of alcohol per week. She reports that she does not use drugs.     Allergies:  Review of patient's allergies indicates:   Allergen Reactions    Atorvastatin calcium Other (See Comments)     Pain    Metformin Diarrhea     Diarrhea and cramping    Rosuvastatin Other (See Comments)     pain    Statins-hmg-coa reductase inhibitors        Medications:  Current Outpatient Medications   Medication Instructions     "albuterol (PROVENTIL/VENTOLIN HFA) 90 mcg/actuation inhaler 2 puffs, Inhalation, Every 4 hours PRN    aspirin (ECOTRIN) 81 mg, Oral, Daily    carvediloL (COREG) 12.5 MG tablet 1 tablet, Oral, 2 times daily with meals    cholecalciferol, vitamin D3, (VITAMIN D3) 50 mcg (2,000 unit) Cap capsule 1 tablet, Oral    ciprofloxacin (CIPRO) 500 mg, Oral, 2 times daily    clopidogreL (PLAVIX) 75 mg tablet 1 tablet, Oral, Daily    droNABinol (MARINOL) 2.5 mg, Oral, 2 times daily before meals    evolocumab (REPATHA SURECLICK SUBQ) Subcutaneous    ezetimibe (ZETIA) 10 mg, Oral, Daily    fluticasone (VERAMYST) 27.5 mcg/actuation nasal spray 2 sprays, Nasal, Daily    furosemide (LASIX) 20 mg, Oral, 2 times daily    guaiFENesin-codeine 100-10 mg/5 ml (TUSSI-ORGANIDIN NR)  mg/5 mL syrup 10 mLs, Oral, Every 4 hours PRN    hydrALAZINE (APRESOLINE) 25 mg, Oral, 3 times daily    insulin aspart U-100 (NOVOLOG) 2 Units, Subcutaneous, As needed (PRN)    levETIRAcetam (KEPPRA) 500 MG Tab     lisinopriL (PRINIVIL,ZESTRIL) 40 MG tablet 1 tablet, Oral, Daily    montelukast (SINGULAIR) 10 mg, Oral    nitroGLYCERIN (NITROSTAT) 0.4 mg, Sublingual    pantoprazole (PROTONIX) 40 mg, Oral, Every morning    PROLIA 60 mg, Subcutaneous, Every 6 months    solifenacin (VESICARE) 10 MG tablet 1 tablet, Oral, Daily    TRADJENTA 5 mg, Oral, Daily    TRELEGY ELLIPTA 200-62.5-25 mcg inhaler 1 puff, Inhalation          Objective:   Vitals:   Vitals:    04/12/24 0858   BP: 100/88   Pulse: 74   Resp: 18   Temp: 97.6 °F (36.4 °C)   TempSrc: Oral   SpO2: 98%   Weight: 45.6 kg (100 lb 8.5 oz)   Height: 5' 1" (1.549 m)     BMI: Body mass index is 18.99 kg/m².  ECOG Performance Status: 1    Physical Exam     General Appearance:    Alert, cooperative, no distress    Head:    Normocephalic, without obvious abnormality, atraumatic   Throat:   Lips, mucosa, and tongue normal; teeth and gums normal   Neck:   Supple, symmetrical, no adenopathy;     thyroid:  no " enlargement/tenderness/nodules   Lungs:     Clear to auscultation bilaterally, respirations unlabored    Heart:    Regular rate and rhythm, S1 and S2 normal   Abdomen:     Soft, non-tender, bowel sounds active, no masses, no organomegaly   Extremities:   Extremities normal, atraumatic, no cyanosis or edema   Pulses:   2+ and symmetric all extremities   Skin:   Skin color, texture, turgor normal, no rashes or lesions   Lymph nodes:   Cervical, supraclavicular, and axillary nodes normal   Neurologic:   CNII-XII intact, normal strength, sensation      Laboratory Data:  No visits with results within 1 Week(s) from this visit.   Latest known visit with results is:   Admission on 04/03/2024, Discharged on 04/03/2024   Component Date Value Ref Range Status    POCT Glucose 04/03/2024 159 (H)  70 - 110 mg/dL Final    Final Pathologic Diagnosis 04/03/2024    Final                    Value:1 and 2.  Svetlana-gastric mass, endoscopic ultrasound-guided (EUS) biopsies with pathologist adequacy:  Parts 1 and 2 show bland reactive histiocytic/myofibroblastic tissue with rare granulomas and benign gastrointestinal elements  No evidence of malignancy    Immunohistochemical stains are performed with results follows:     (CKIT), CD34, and desmin: Negative   Smooth muscle actin (SMA):  Positive staining of myofibroblasts  S100:  Positive staining of entrapped nerves  All stains evaluated with appropriate controls.  Pravin Sethi MD agrees with the diagnosis      Interp By Mai Hernandez M.D., Signed on 04/10/2024 at 15:58    Adequacy 04/03/2024    Final                    Value:pass#1-3  spindled tissue, lymphocytes, glandular cells    Dr. Sethi reported results to Dr. Lomax @11:00 on 04/03/2024      Disclaimer 04/03/2024    Final                    Value:Screening was performed at [facility], [address], [place] [address].              (c-KIT) immunohistochemical staining  (clone 9.7, DAB detection method) is performed on formalin-fixed (10% neutral buffered formalin), paraffin embedded tissue sections. GIST tumors are considered positive for c-KIT protein expression if any   neoplastic cells demonstrate specific cytoplasmic and/or cell membrane staining. Strong cytoplasmic, membrane, and occasional dot-like perinuclear Golgi staining are reliable indicators of c-KIT expression. Staining of c-KIT in GIST is often   heterogeneous and not all neoplastic cells display positivity. This test was developed and performance characteristics determined by [facility], Section of Anatomic Pathology. It has been cleared by the U.S. Food and Drug Administration.        Recent Labs   Lab Result Units 24  1106 24  1251   WBC K/uL 6.45 6.19   Hemoglobin g/dL 12.1 11.0*   Hematocrit % 37.3 34.7*   Platelets K/uL 332 297     Recent Labs   Lab Result Units 24  1106 24  1251   Creatinine mg/dL 0.9 1.2   AST U/L 21 15   ALT U/L 20 11     Recent Labs   Lab Result Units 24  1106   Iron ug/dL 46   Ferritin ng/mL 216        Imagin2024: CT CAP w/ IV contrast: scattered pulmonary nodules - largest is a 4.3 cm Right hilar/RML pulmonary mass w/ occlusion of the RML bronchus, mass in the anastacia hepatis (surrounds the portal vein, CBD dilation, extending into the RP surrounding the aorta/iliac and SMA; tumor does not appear to originate in the pancreas, could be conglomerate node or primary mass, numerous liver lesions c/f metastasis, mesenteric adenopathy, splenic infarcts     2024: PET/CT: Hypermetabolic infiltrative mass in the anastacia hepatis/RP (superior/posterior to pancreas, SUV: 7.3, surrounding CBD), lungs, right hilum, mediastinum, pleura, pericardium, liver (small, SUV: 3.4), mesenteric, RP, and pelvic adenopathy, left SC (SUV: 3.7), right neck (SUV: 2.4, inferior to parotid), diffuse interstitial involvement in both lungs (interstitial  "thickening/nodularity, mildly thickened pleural w/ uptake), bone (right femoral neck, pelvis, spine, scapula, sternum, SUV: 5.8)    3/18/24: CT chest w/o contrast: stable bilateral perihilar/peribronchial predominant irregular consolidations, mediastinal irregular peripheral soft tissue densities, small left pleural effusion, findings favored to represent sarcoidosis    Pathology:   3/1/2024: CT guided core needle mediastinal lymph node biopsy - focal noncaseating granulomatous inflammation, "one of the fragments show a few small non-caseating granulomas, no evidence of malignancy"    4/3/24: EUS: numerous enlarged nodes in middle paraesophageal mediastinum (8M), gastrohepatic ligament (18), and peripancreatic region - largest 1.3 cm, FNA performed (2 passes with 22 gauge needle); 3 cm mass in area of celiac/SMA takeoff (involved with arterial walls) - FNA performed of this mass as well  - no pathology in esophagus, stomach, duodenum, biliary tree, pancreas  PATHOLOGY: reactive histiocytic/myofibroblastic tissue with rare granulomas, no malignancy       Assessment   Assessment and Plan:   Kylah Deal is a 73 y.o. female former smoker with CAD s/p CABG, DM2, HTN, possible COPD, HTN, Possible Seizure Disorder ([facility]). She is followed by Medical Oncology as imaging performed in evaluation of hypercalcemia revealed a Enrrique Hepatic Mass, RML Pulmonary Mass, Adenopathy, and Bone Lesions, with CT guided core needle mediastinal node biopsy on 3/1/24 revealing focal noncaseating granulomatous inflammation, no overt malignancy. Given continued radiographic concern for malignancy, EUS guided PH mass biopsy was performed on 4/3, she presents today to review results.     # RML Pulmonary Mass  # Enrrique Hepatic Mass  # Adenopathy  # Bone Lesions   - Reviewed outside CT, subsequent PET images from [facility], and updated CT chest from 3/18/24 showing a enrrique hepatic mass, RML pulmonary mass with occlusion of the RML bronchus, scattered " adenopathy, and bone lesions, all of which are hypermetabolic on PET. Numerous biopsies including CT guided core needle mediastinal node biopsy on 3/1/2024 revealing a small fragment of noncaseating granulomas w/o overt malignancy. She has also had a bronch with biopsies in January at [facility] [place] [place] w/ negative results.   - Reviewed at tumor board with recommendation for additional tissue as findings in the lung may represent sarcoidosis but other lesions remain c/f malignancy; PH mass biopsied. In discussion with pathology, no malignancy present in limited sample; rare granulomas noted.   - She does have an IgM monoclonal protein noted on SPEP/GRACE performed in evaluation of lytic lesions; as such will perform bone marrow biopsy to distinguish between MGUS vs IgM myeloma versus LPL.   - Updated Dr. Briggs of above. 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 metastatic disease not excluded, other: supraspinatus tendinosis, posterosuperior labral tear, AC joint arthrosis, subacromial/subdeltoid bursitis - spontaneously resolved   - CAD s/p CABG (Plavix), DM2 (linagliptin), HTN (lisinopril), COPD (PFTs - 24 - FEV1: 50-79%, mild restriction, decreased DLCO<40%), HTN, Possible Seizure Disorder  (Keppra): stable on current regimen, per PCP (outside of [facility])  - Genetics: pending above, consider genetic testing given strong FH  - Other Cancer Screening: Colonoscopy: 3/22/2019, MM2022, Pap: POLINA in     Follow Up:  - Bone marrow biopsy - requested ASAP  - Labs - repeat calcium (drawn outside, pending) and ACE level  - RTC after bone marrow biopsy results   [] liquid biopsy, genetics, prolia, orthopedics     MDM includes:    - Acute or chronic illness or injury that poses a threat to life or bodily function  - Consideration and discussion of significant complications based on comorbidities  - Review of prior external notes from unique source and review of diagnostic tests and information  - Independent review and explanation of 3+ results from unique tests   - Discussion of management and ordering 3+ unique tests   - Extensive discussion of treatment and management including consideration of possible diagnoses and management options  - Prescription drug management  - Drug therapy requiring intensive monitoring for toxicity    - Patient seen in diagnosis clinic.   - Overall, I discussed the diagnosis, history, stage, labs/imaging, prognosis, management, and treatment plan as applicable. I reviewed adverse short and long term effects as applicable.   - Informed patient if symptoms are getting worse that it is their responsibility to call the clinic and schedule follow up sooner than stated follow up. Also informed patient if they do not hear from the appointment center in 2-5 business days for their referrals, the patient must call the Oncology clinic so we can follow up on procedures or referral scheduling. Patient was fully informed of current medical plan, all questions were answered and patient verbalized understanding. No further questions.   - Face to Face Visit time I spent with the patient: 40 minutes of total time spent on the encounter, including counseling patient and/or coordinating care,  which includes face to face time and non-face to face time preparing to see the patient (eg, review of tests), Obtaining and/or reviewing separately obtained history, Documenting clinical information in the electronic or other health record, Independently interpreting results (not separately reported) and communicating results to the patient/family/caregiver, or Care coordination (not separately reported).     Signed   Karla Sanchez MD  [facility]

## 2024-04-12 NOTE — TELEPHONE ENCOUNTER
Called and spoke to pt.  JUANITA scheduled for 3 pm on 4/29.  I gave pt my name and number to call me if she has any questions or concerns.

## 2024-04-12 NOTE — Clinical Note
Hi Dr. Briggs, I wanted to give you an update on mutual patient, Mrs. Deal. I ended up discussing her case at tumor board, and group recommended to biopsy the PH mass as findings in lung could represent sarcoid but wanted to make sure a malignant process wasn't causing findings outside of the lung.  PH mass biopsy via EUS is negative for malignancy, rare granulomas noted. That said, discussed w/ pathology who notes it is a limited sample.   I did myeloma labs bc of the bone lesions and she does have a monoclonal protein, which could just be MGUS, but going to do a marrow to see number of plasma cells in her bone marrow.   All that said, I'm wondering if she has sarcoid and MGUS, and wanted to get her plugged back in with you after the bone marrow biopsy, scheduled on 4/29. Let me know what you think.  Thanks!

## 2024-04-12 NOTE — TELEPHONE ENCOUNTER
----- Message from Karla Sanchez MD sent at 4/12/2024 12:14 PM CDT -----  Great, thanks Shaina! Team - please let Mrs. Deal know about the following regarding the date, time, location of the bone marrow and other instructions.    ----- Message -----  From: Shaina Carlos NP  Sent: 4/12/2024  11:59 AM CDT  To: Jasvir Martin RN; Karla Sanchez MD; #    She is scheduled for 4/23 @0800 in our clinic at [facility] on the 5th floor of the [facility]. She does not need to fast before hand. She will however be awake with only local lidocaine. This is not a sedation marrow. Please inform the patient. If she would like oral antianxiety/pain medications for the procedure please prescribe them for her as we do not have access to any medications in clinic.     Thank you!  Shaina Carlos NP  Hematology/Oncology/BMT      ----- Message -----  From: Karla Sanchez MD  Sent: 4/12/2024   9:35 AM CDT  To: Jasvir Martin RN; Shaina Carlos NP; #    Hi all,  I have a patient w/ lytic lesions and an IgM lambda monoclonal protein, was hoping you could help me schedule a bone marrow biopsy ASAP.    Thanks!

## 2024-04-17 DIAGNOSIS — E11.9 TYPE 2 DIABETES MELLITUS WITHOUT COMPLICATION, UNSPECIFIED WHETHER LONG TERM INSULIN USE: ICD-10-CM

## 2024-04-17 DIAGNOSIS — E11.9 TYPE 2 DIABETES MELLITUS WITHOUT COMPLICATION: ICD-10-CM

## 2024-04-18 ENCOUNTER — TELEPHONE (OUTPATIENT)
Dept: PULMONOLOGY | Facility: CLINIC | Age: 74
End: 2024-04-18
Payer: MEDICARE

## 2024-04-18 NOTE — TELEPHONE ENCOUNTER
LVM for patient to return my call in regards to scheduling an follow up visit. Office number was provided.

## 2024-04-18 NOTE — TELEPHONE ENCOUNTER
I spoke with Ms Deal in regards to scheduling an follow up visit per Dr Briggs. Patient is scheduled on 4/24/24 at 2 PM. Appointment mailed. Patient confirmed and verbalized understanding.

## 2024-04-18 NOTE — TELEPHONE ENCOUNTER
----- Message from Ivelisse Briggs MD sent at 4/18/2024  4:12 PM CDT -----  Can we please get Ms. Deal a follow up appointment?  Virtual or in-person.  Either is fine.  ----- Message -----  From: Karla Sanchez MD  Sent: 4/17/2024  12:52 PM CDT  To: Ivelisse Briggs MD    Hi Dr. Briggs,  I wanted to give you an update on mutual patient, Mrs. Deal. I ended up discussing her case at tumor board, and group recommended to biopsy the PH mass as findings in lung could represent sarcoid but wanted to make sure a malignant process wasn't causing findings outside of the lung.    PH mass biopsy via EUS is negative for malignancy, rare granulomas noted. That said, discussed w/ pathology who notes it is a limited sample.     I did myeloma labs bc of the bone lesions and she does have a monoclonal protein, which could just be MGUS, but going to do a marrow to see number of plasma cells in her bone marrow.     All that said, I'm wondering if she has sarcoid and MGUS, and wanted to get her plugged back in with you after the bone marrow biopsy, scheduled on 4/29. Let me know what you think.    Thanks!   Polysubstance hx  AIDS          No w/d issues   for placement  Can f/u with ancillary outpt program /53  for after care, if she wants.

## 2024-04-24 ENCOUNTER — OFFICE VISIT (OUTPATIENT)
Dept: PULMONOLOGY | Facility: CLINIC | Age: 74
End: 2024-04-24
Payer: MEDICARE

## 2024-04-24 ENCOUNTER — LAB VISIT (OUTPATIENT)
Dept: LAB | Facility: HOSPITAL | Age: 74
End: 2024-04-24
Attending: INTERNAL MEDICINE
Payer: MEDICARE

## 2024-04-24 VITALS
WEIGHT: 100.06 LBS | HEART RATE: 81 BPM | OXYGEN SATURATION: 98 % | DIASTOLIC BLOOD PRESSURE: 68 MMHG | SYSTOLIC BLOOD PRESSURE: 118 MMHG | BODY MASS INDEX: 18.89 KG/M2 | HEIGHT: 61 IN

## 2024-04-24 DIAGNOSIS — H04.123 DRY MOUTH AND EYES: ICD-10-CM

## 2024-04-24 DIAGNOSIS — E11.9 TYPE 2 DIABETES MELLITUS WITHOUT COMPLICATION, UNSPECIFIED WHETHER LONG TERM INSULIN USE: ICD-10-CM

## 2024-04-24 DIAGNOSIS — E11.9 TYPE 2 DIABETES MELLITUS WITHOUT COMPLICATION: ICD-10-CM

## 2024-04-24 DIAGNOSIS — R68.2 DRY MOUTH AND EYES: Primary | ICD-10-CM

## 2024-04-24 DIAGNOSIS — H04.123 DRY MOUTH AND EYES: Primary | ICD-10-CM

## 2024-04-24 DIAGNOSIS — R68.2 DRY MOUTH AND EYES: ICD-10-CM

## 2024-04-24 DIAGNOSIS — R93.89 ABNORMAL CT OF THE CHEST: ICD-10-CM

## 2024-04-24 LAB — CCP AB SER IA-ACNC: <0.5 U/ML

## 2024-04-24 PROCEDURE — 36415 COLL VENOUS BLD VENIPUNCTURE: CPT | Performed by: INTERNAL MEDICINE

## 2024-04-24 PROCEDURE — 3288F FALL RISK ASSESSMENT DOCD: CPT | Mod: CPTII,S$GLB,, | Performed by: INTERNAL MEDICINE

## 2024-04-24 PROCEDURE — 1160F RVW MEDS BY RX/DR IN RCRD: CPT | Mod: CPTII,S$GLB,, | Performed by: INTERNAL MEDICINE

## 2024-04-24 PROCEDURE — 3074F SYST BP LT 130 MM HG: CPT | Mod: CPTII,S$GLB,, | Performed by: INTERNAL MEDICINE

## 2024-04-24 PROCEDURE — 1101F PT FALLS ASSESS-DOCD LE1/YR: CPT | Mod: CPTII,S$GLB,, | Performed by: INTERNAL MEDICINE

## 2024-04-24 PROCEDURE — 86200 CCP ANTIBODY: CPT | Performed by: INTERNAL MEDICINE

## 2024-04-24 PROCEDURE — 99999 PR PBB SHADOW E&M-EST. PATIENT-LVL V: CPT | Mod: PBBFAC,,, | Performed by: INTERNAL MEDICINE

## 2024-04-24 PROCEDURE — 99213 OFFICE O/P EST LOW 20 MIN: CPT | Mod: S$GLB,,, | Performed by: INTERNAL MEDICINE

## 2024-04-24 PROCEDURE — 86038 ANTINUCLEAR ANTIBODIES: CPT | Performed by: INTERNAL MEDICINE

## 2024-04-24 PROCEDURE — 1159F MED LIST DOCD IN RCRD: CPT | Mod: CPTII,S$GLB,, | Performed by: INTERNAL MEDICINE

## 2024-04-24 PROCEDURE — 3078F DIAST BP <80 MM HG: CPT | Mod: CPTII,S$GLB,, | Performed by: INTERNAL MEDICINE

## 2024-04-24 RX ORDER — HYDRALAZINE HYDROCHLORIDE 50 MG/1
1 TABLET, FILM COATED ORAL 2 TIMES DAILY
COMMUNITY
Start: 2024-04-18

## 2024-04-24 NOTE — ASSESSMENT & PLAN NOTE
CT Chest and PET from outside hospital show a diffuse parenchymal lung process.    Will follow up results of BM biopsy and then begin treatment for sarcoid.  Discussed prednisone therapy with the patient and also the likely addition of methotrexate as a steroid sparring agent in the setting of DM.

## 2024-04-24 NOTE — PROGRESS NOTES
Subjective:       Patient ID: Kylah Deal is a 73 y.o. female.    Chief Complaint: No chief complaint on file.    HPI:   Kylah Deal is a 73 y.o. female who presents with for evaluation of abnormal imaging.  Last seen on 2024.  Since that time she has had an EUS of a anand-hilar mass. Pathology with rare granulomas.  BM biopsy is scheduled for 24.    Her symptoms are essentially unchanged.  She still has significant MAHONEY, night sweats, and fatigue.    __________________________________________  Noted to have hypercalcemia on routine labs.   This led to imaging.  There was concern for sarcoidosis.  BAL and TBBX were negative for granulomatous disease (which seems unusual given the degree of lung involvement.)   A follow up biopsy of a LN by IR resulted in non-caseating granulomas, however the sample was quite small and the physician did not feel that he had adequately biopsied the area.  The patient is understandably frustrated by the lack of definitive diagnosis at this juncture.     Additional pertinent history:   She reports that nothing tastes right  Reports significant sinus symptoms  Shortness of breath with minimal exertion.  Persistent dry and irritating cough.  She also reports significant weight loss and loss of appetite.    Former smoker. Stopped in .  1/2ppw age [age]   No known occupational exposures.  Father was a   Dad was a heavy smoker- 3ppd;  of lung cancer  Mother with COPD    Review of Systems   Constitutional:  Positive for weight loss and activity change.   HENT:  Positive for postnasal drip and congestion.         Dry mouth   Respiratory:  Positive for cough (for at least a year), sputum production (in the morning), shortness of breath and orthopnea.          Social History     Tobacco Use    Smoking status: Former     Current packs/day: 0.00     Types: Cigarettes     Quit date:      Years since quittin.3    Smokeless tobacco: Never   Substance Use Topics     Alcohol use: Yes     Alcohol/week: 1.0 standard drink of alcohol     Types: 1 Glasses of wine per week       Review of patient's allergies indicates:   Allergen Reactions    Atorvastatin calcium Other (See Comments)     Pain    Metformin Diarrhea     Diarrhea and cramping    Rosuvastatin Other (See Comments)     pain    Statins-hmg-coa reductase inhibitors      Past Medical History:   Diagnosis Date    CAD (coronary artery disease)     Diabetes mellitus     Hypertension     Migraine     Seizures      Past Surgical History:   Procedure Laterality Date    APPENDECTOMY      CHOLECYSTECTOMY      CORONARY ARTERY BYPASS GRAFT      ENDOSCOPIC ULTRASOUND OF UPPER GASTROINTESTINAL TRACT N/A 4/3/2024    Procedure: ULTRASOUND, UPPER GI TRACT, ENDOSCOPIC;  Surgeon: Lavell Lomax MD;  Location: [facility] ENDO;  Service: Endoscopy;  Laterality: N/A;  4/1 portal- EUS-guided biopsy of anastacia hepatis mass.    HEEL SPUR EXCISION  1997    HIP FRACTURE SURGERY Left 2018    HYSTERECTOMY      STENT PLACEMENT/PRIOR TO [facility]  2017    TONSILLECTOMY      URETHRAL SLING  2022    WRIST SURGERY Right 2005     Current Outpatient Medications   Medication Sig Dispense Refill    albuterol (PROVENTIL/VENTOLIN HFA) 90 mcg/actuation inhaler Inhale 2 puffs into the lungs every 4 (four) hours as needed.      aspirin (ECOTRIN) 81 MG EC tablet Take 81 mg by mouth once daily.      carvediloL (COREG) 12.5 MG tablet Take 1 tablet by mouth 2 (two) times daily with meals.      cholecalciferol, vitamin D3, (VITAMIN D3) 50 mcg (2,000 unit) Cap capsule Take 1 tablet by mouth.      clopidogreL (PLAVIX) 75 mg tablet Take 1 tablet by mouth once daily.      denosumab (PROLIA) 60 mg/mL Syrg Inject 60 mg into the skin every 6 (six) months.      droNABinol (MARINOL) 2.5 MG capsule Take 2.5 mg by mouth 2 (two) times daily before meals.      evolocumab (REPATHA SURECLICK SUBQ) Inject into the skin.      ezetimibe (ZETIA) 10 mg tablet Take 10 mg by mouth once daily.       "fluticasone (VERAMYST) 27.5 mcg/actuation nasal spray 2 sprays by Nasal route once daily.      furosemide (LASIX) 20 MG tablet Take 20 mg by mouth 2 (two) times daily.      guaiFENesin-codeine 100-10 mg/5 ml (TUSSI-ORGANIDIN NR)  mg/5 mL syrup Take 10 mLs by mouth every 4 (four) hours as needed.      hydrALAZINE (APRESOLINE) 25 MG tablet Take 25 mg by mouth 3 (three) times daily.      insulin aspart U-100 (NOVOLOG) 100 unit/mL (3 mL) InPn pen Inject 2 Units into the skin as needed.      levETIRAcetam (KEPPRA) 500 MG Tab       linaGLIPtin (TRADJENTA) 5 mg Tab tablet Take 5 mg by mouth once daily.      lisinopriL (PRINIVIL,ZESTRIL) 40 MG tablet Take 1 tablet by mouth once daily.      montelukast (SINGULAIR) 10 mg tablet Take 10 mg by mouth.      nitroGLYCERIN (NITROSTAT) 0.4 MG SL tablet Place 0.4 mg under the tongue.      pantoprazole (PROTONIX) 40 MG tablet Take 40 mg by mouth every morning.      solifenacin (VESICARE) 10 MG tablet Take 1 tablet by mouth once daily.      TRELEGY ELLIPTA 200-62.5-25 mcg inhaler Inhale 1 puff into the lungs.       No current facility-administered medications for this visit.       Objective:      Vitals:    04/24/24 1342   BP: 118/68   BP Location: Left arm   Patient Position: Sitting   Pulse: 81   SpO2: 98%   Weight: 45.4 kg (100 lb 1.4 oz)   Height: 5' 1" (1.549 m)       Physical Exam   Constitutional: She is oriented to person, place, and time. She appears well-developed and well-nourished.   HENT:   Mouth/Throat: Mallampati Score: III.   Cardiovascular: Normal rate and regular rhythm.   Pulmonary/Chest: Normal expansion and breath sounds normal. She has no wheezes. She has no rhonchi.   Musculoskeletal:         General: No edema.   Lymphadenopathy: No supraclavicular adenopathy is present.     She has no cervical adenopathy.     She has no axillary adenopathy.   Neurological: She is alert and oriented to person, place, and time.   Skin: Skin is warm and dry.   Psychiatric: " "She has a normal mood and affect. Her behavior is normal. Judgment and thought content normal.     Personal Diagnostic Review        4/12/2024     8:58 AM 4/3/2024    12:14 PM 4/3/2024    12:05 PM 4/3/2024    11:50 AM 4/3/2024    11:35 AM 4/3/2024    10:19 AM 3/28/2024     3:49 PM   Pulmonary Function Tests   SpO2 98 % 100 % 100 % 100 % 99 % 98 % 98 %   Height 5' 1" (1.549 m)     5' 1" (1.549 m) 5' 5" (1.651 m)   Weight 45.6 kg (100 lb 8.5 oz)     45.4 kg (100 lb) 46.3 kg (102 lb 1.2 oz)   BMI (Calculated) 19     18.9 17         MRI Cervical Spine W WO Cont  Narrative: EXAMINATION:  MRI CERVICAL SPINE W WO CONTRAST    CLINICAL HISTORY:  Metastatic disease evaluation;  Cervicalgia    TECHNIQUE:  MRI of the cervical spine, before and after intravenous administration of 4.5 mL Gadavist.    COMPARISON:  NM PET 02/20/2024; CT chest 03/18/2024    FINDINGS:  ALIGNMENT: Normal.    BONE:  There are multiple marrow replacing lesions in the cervical spine, upper thoracic spine, and right medial clavicle.  None demonstrate cortical destruction or extraosseous extension.  No compression fracture.    JOINT: Disc dessication at multiple levels. No disc height loss.  Multilevel facet arthropathy.  No bone marrow edema.    SPINAL CANAL: The cervical spinal cord is unremarkable.  No mass or collection.  No abnormal enhancement.    POSTERIOR FOSSA: Refer to same day brain MRI.    PARASPINAL SOFT TISSUES: There are pulmonary nodules in the lung apices bilaterally, better characterized on recent chest CT.    SIGNIFICANT FINDINGS BY LEVEL: Disc osteophyte complexes at multiple levels.  Mild canal stenosis at multiple levels, most pronounced at C4-5.  Mild foraminal stenosis at multiple levels, most pronounced at C5-6 on the right and at C3-4 on the left.  No high-grade stenosis.  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  No extraosseous extension or pathologic " compression fracture.    Mild degenerative changes as described.  No high-grade stenosis.    Electronically signed by: Natan Kelly  Date:    03/27/2024  Time:    16:12  MRI Brain W WO Contrast  Narrative: EXAMINATION:  MRI BRAIN W WO CONTRAST    CLINICAL HISTORY:  Metastatic disease evaluation; Cervicalgia    TECHNIQUE:  Multiplanar multisequence MR imaging of the brain was performed before and after the administration of 4.5 mL Gadavist intravenous contrast.    COMPARISON:  No priors    FINDINGS:  Pattern of mild generalized cerebral volume loss, without evidence of hydrocephalus.    Modest patchy T2/FLAIR hyperintensity in the supratentorial white matter, nonspecific but most likely reflecting chronic small vessel ischemic changes. No recent or remote major vascular distribution infarct.  Probable punctate foci of remote hemorrhage in the right caudate right frontal subcortical white matter.  No acute hemorrhage..  No significant intracranial mass effect or midline shift.    No abnormal parenchymal or leptomeningeal enhancement.    No extra-axial blood or fluid collections.    The T2 skull base flow voids are preserved.    Bone marrow signal intensity is unremarkable.  Impression: No evidence of intracranial metastatic disease.    Electronically signed by: Austen Howe MD  Date:    03/27/2024  Time:    16:02  MRI Shoulder W WO Contrast Left  Narrative: EXAMINATION:  MRI SHOULDER W WO CONTRAST LEFT    CLINICAL HISTORY:  Musculoskeletal neoplasm, staging;    TECHNIQUE:  Routine MRI evaluation of the left shoulder performed with and without the use of 4.5 cc of Gadavist IV contrast.    COMPARISON:  Nuclear medicine bone scan 02/20/2024.    FINDINGS:  ROTATOR CUFF: Supraspinatus tendinosis.  Infraspinatus, subscapularis and teres minor tendons are intact.  Muscle bulk is preserved.    LABRUM: Abnormal morphology of the posterior-superior labrum with linear fluid signal undercutting the chondrolabral junction.   Remaining labral segments appear within normal limits.    BICEPS: Normal contour and signal intensity.    BONES: Enhancing, T1 hypointense discrete, round intramedullary lesions at the base of the acromion with surrounding marrow edema.  No endosteal scalloping or cortical breakthrough.  Remaining visualized osseous structures demonstrate no significant abnormalities.  No acute fractures.  No avascular necrosis.    AC JOINT: AC joint arthrosis.  Flat morphology of the lateral acromion with mild undersurface spurring.    CARTILAGE: Intact without partial or full-thickness defects.    MISCELLANEOUS: No joint effusion.  Superior, middle and inferior glenohumeral ligaments are intact.  No enhancing synovitis.  Mild fluid distention of the subacromial/subdeltoid bursa.  Heterogeneous signal intensity throughout the left lung, better evaluated on previous CT chest.  Impression: 1. Discrete, round intramedullary lesions at the base of the acromion that demonstrate indeterminate imaging MR imaging characteristics.  Given findings on previous CT chest and biopsy results, sequela of skeletal sarcoidosis should be considered.  Metastatic disease cannot be excluded.  2. Supraspinatus tendinosis.  3. Posterosuperior labral tear.  4. AC joint arthrosis.  5. Subacromial/subdeltoid bursitis.    Electronically signed by: Rhett Hagen MD  Date:    03/27/2024  Time:    15:54  PFTs: 2/8/24: moderate obstruction, moderate restriction, severely reduced DLCO.    Assessment:     Orders Placed This Encounter   Procedures    KALEE     Standing Status:   Future     Standing Expiration Date:   7/23/2025    Cyclic Citrullinated Peptide Antibody, IgG     Standing Status:   Future     Standing Expiration Date:   6/23/2025     1. Dry mouth and eyes    2. Abnormal CT of the chest          Plan:         Problem List Items Addressed This Visit          Other    Abnormal CT of the chest    Current Assessment & Plan     CT Chest and PET from outside  hospital show a diffuse parenchymal lung process.    Will follow up results of BM biopsy and then begin treatment for sarcoid.  Discussed prednisone therapy with the patient and also the likely addition of methotrexate as a steroid sparring agent in the setting of DM.         Dry mouth and eyes - Primary    Current Assessment & Plan     Will send KALEE to r/o Sjogren           Relevant Orders    KALEE    Cyclic Citrullinated Peptide Antibody, IgG

## 2024-04-25 ENCOUNTER — PATIENT MESSAGE (OUTPATIENT)
Dept: PULMONOLOGY | Facility: CLINIC | Age: 74
End: 2024-04-25
Payer: MEDICARE

## 2024-04-25 LAB — ANA SER QL IF: NORMAL

## 2024-04-27 DIAGNOSIS — M89.9 LYTIC BONE LESIONS ON XRAY: Primary | ICD-10-CM

## 2024-04-27 RX ORDER — DIAZEPAM 2 MG/1
2 TABLET ORAL SEE ADMIN INSTRUCTIONS
Qty: 2 TABLET | Refills: 0 | Status: SHIPPED | OUTPATIENT
Start: 2024-04-27 | End: 2024-05-07

## 2024-04-29 ENCOUNTER — PROCEDURE VISIT (OUTPATIENT)
Dept: HEMATOLOGY/ONCOLOGY | Facility: CLINIC | Age: 74
End: 2024-04-29
Payer: MEDICARE

## 2024-04-29 VITALS
HEART RATE: 74 BPM | SYSTOLIC BLOOD PRESSURE: 199 MMHG | DIASTOLIC BLOOD PRESSURE: 93 MMHG | OXYGEN SATURATION: 97 % | TEMPERATURE: 98 F

## 2024-04-29 DIAGNOSIS — D72.0 GENETIC ANOMALIES OF LEUKOCYTES: ICD-10-CM

## 2024-04-29 DIAGNOSIS — R77.8 ABNORMAL SPEP: ICD-10-CM

## 2024-04-29 DIAGNOSIS — Z13.79 ENCOUNTER FOR OTHER SCREENING FOR GENETIC AND CHROMOSOMAL ANOMALIES: ICD-10-CM

## 2024-04-29 DIAGNOSIS — M89.9 LYTIC BONE LESIONS ON XRAY: Primary | ICD-10-CM

## 2024-04-29 DIAGNOSIS — M89.9 LYTIC BONE LESIONS ON XRAY: ICD-10-CM

## 2024-04-29 PROCEDURE — 88342 IMHCHEM/IMCYTCHM 1ST ANTB: CPT | Mod: 59,HCNC | Performed by: PATHOLOGY

## 2024-04-29 PROCEDURE — 88342 IMHCHEM/IMCYTCHM 1ST ANTB: CPT | Mod: 26,59,HCNC, | Performed by: PATHOLOGY

## 2024-04-29 PROCEDURE — 88274 CYTOGENETICS 25-99: CPT | Mod: 59,HCNC

## 2024-04-29 PROCEDURE — 88305 TISSUE EXAM BY PATHOLOGIST: CPT | Mod: 26,HCNC,, | Performed by: PATHOLOGY

## 2024-04-29 PROCEDURE — 88189 FLOWCYTOMETRY/READ 16 & >: CPT | Mod: HCNC,,, | Performed by: PATHOLOGY

## 2024-04-29 PROCEDURE — 88341 IMHCHEM/IMCYTCHM EA ADD ANTB: CPT | Mod: 26,HCNC,, | Performed by: PATHOLOGY

## 2024-04-29 PROCEDURE — 38222 DX BONE MARROW BX & ASPIR: CPT | Mod: LT,S$GLB,,

## 2024-04-29 PROCEDURE — 81305 MYD88 GENE P.LEU265PRO VRNT: CPT

## 2024-04-29 PROCEDURE — 85097 BONE MARROW INTERPRETATION: CPT | Mod: HCNC,,, | Performed by: PATHOLOGY

## 2024-04-29 PROCEDURE — 88184 FLOWCYTOMETRY/ TC 1 MARKER: CPT | Mod: 59,HCNC | Performed by: PATHOLOGY

## 2024-04-29 PROCEDURE — 88185 FLOWCYTOMETRY/TC ADD-ON: CPT | Mod: 91

## 2024-04-29 PROCEDURE — 88299 UNLISTED CYTOGENETIC STUDY: CPT

## 2024-04-29 PROCEDURE — 88313 SPECIAL STAINS GROUP 2: CPT | Mod: 26,HCNC,, | Performed by: PATHOLOGY

## 2024-04-29 PROCEDURE — 88341 IMHCHEM/IMCYTCHM EA ADD ANTB: CPT | Mod: 59,HCNC | Performed by: PATHOLOGY

## 2024-04-29 PROCEDURE — 88271 CYTOGENETICS DNA PROBE: CPT | Mod: 59,HCNC

## 2024-04-29 PROCEDURE — 88185 FLOWCYTOMETRY/TC ADD-ON: CPT | Mod: 59,HCNC | Performed by: PATHOLOGY

## 2024-04-29 PROCEDURE — 88184 FLOWCYTOMETRY/ TC 1 MARKER: CPT

## 2024-04-29 PROCEDURE — 88311 DECALCIFY TISSUE: CPT | Mod: HCNC | Performed by: PATHOLOGY

## 2024-04-29 PROCEDURE — 88313 SPECIAL STAINS GROUP 2: CPT | Mod: HCNC | Performed by: PATHOLOGY

## 2024-04-29 PROCEDURE — 88271 CYTOGENETICS DNA PROBE: CPT

## 2024-04-29 PROCEDURE — 88305 TISSUE EXAM BY PATHOLOGIST: CPT | Mod: 59,HCNC | Performed by: PATHOLOGY

## 2024-04-29 RX ORDER — LIDOCAINE HYDROCHLORIDE 20 MG/ML
10 INJECTION, SOLUTION INFILTRATION; PERINEURAL
Status: COMPLETED | OUTPATIENT
Start: 2024-04-29 | End: 2024-04-29

## 2024-04-29 RX ADMIN — LIDOCAINE HYDROCHLORIDE 10 ML: 20 INJECTION, SOLUTION INFILTRATION; PERINEURAL at 03:04

## 2024-04-30 LAB
PCPDS FINAL DIAGNOSIS: NORMAL
PCPDS PRE-ANALYSIS PRE-SORT: NORMAL

## 2024-05-01 LAB
DNA/RNA EXTRACT AND HOLD RESULT: NORMAL
DNA/RNA EXTRACTION: NORMAL
EXHR SPECIMEN TYPE: NORMAL

## 2024-05-06 LAB
BODY SITE - BONE MARROW: NORMAL
CLINICAL DIAGNOSIS - BONE MARROW: NORMAL
FLOW CYTOMETRY ANTIBODIES ANALYZED - BONE MARROW: NORMAL
FLOW CYTOMETRY COMMENT - BONE MARROW: NORMAL
FLOW CYTOMETRY INTERPRETATION - BONE MARROW: NORMAL
GENETICIST REVIEW: NORMAL
MAYO MISCELLANEOUS RESULT (REF): NORMAL
PLASMA CELL PROLIF RELEASED BY: NORMAL
PLASMA CELL PROLIF RESULT SUMMARY: NORMAL
PLASMA CELL PROLIF RESULT TABLE: NORMAL
REASON FOR REFERRAL, PLASMA CELL PROLIF (PCPD), FISH: NORMAL
REF LAB TEST METHOD: NORMAL
RESULTS, PLASMA CELL PROLIF (PCPD), FISH: NORMAL
SERVICE CMNT-IMP: NORMAL
SERVICE CMNT-IMP: NORMAL
SPECIMEN SOURCE: NORMAL
SPECIMEN, PLASMA CELL PROLIF (PCPD), FISH: NORMAL

## 2024-05-07 ENCOUNTER — OFFICE VISIT (OUTPATIENT)
Dept: HEMATOLOGY/ONCOLOGY | Facility: CLINIC | Age: 74
End: 2024-05-07
Payer: MEDICARE

## 2024-05-07 DIAGNOSIS — I25.10 CORONARY ARTERY DISEASE INVOLVING NATIVE CORONARY ARTERY OF NATIVE HEART WITHOUT ANGINA PECTORIS: ICD-10-CM

## 2024-05-07 DIAGNOSIS — E83.52 HYPERCALCEMIA: ICD-10-CM

## 2024-05-07 DIAGNOSIS — D86.9 SARCOIDOSIS: ICD-10-CM

## 2024-05-07 DIAGNOSIS — I10 PRIMARY HYPERTENSION: ICD-10-CM

## 2024-05-07 DIAGNOSIS — M89.9 BONE LESION: Primary | ICD-10-CM

## 2024-05-07 PROBLEM — R68.2 DRY MOUTH AND EYES: Status: RESOLVED | Noted: 2024-04-24 | Resolved: 2024-05-07

## 2024-05-07 PROBLEM — H04.123 DRY MOUTH AND EYES: Status: RESOLVED | Noted: 2024-04-24 | Resolved: 2024-05-07

## 2024-05-07 PROBLEM — R93.89 ABNORMAL CT OF THE CHEST: Status: RESOLVED | Noted: 2024-03-18 | Resolved: 2024-05-07

## 2024-05-07 PROCEDURE — 1160F RVW MEDS BY RX/DR IN RCRD: CPT | Mod: HCNC,CPTII,95, | Performed by: INTERNAL MEDICINE

## 2024-05-07 PROCEDURE — 1159F MED LIST DOCD IN RCRD: CPT | Mod: HCNC,CPTII,95, | Performed by: INTERNAL MEDICINE

## 2024-05-07 PROCEDURE — 99215 OFFICE O/P EST HI 40 MIN: CPT | Mod: HCNC,95,, | Performed by: INTERNAL MEDICINE

## 2024-05-07 NOTE — PROGRESS NOTES
[facility]     Outpatient Medical Oncology Note  Patient: Kylah Deal  MRN: [id number]  Primary Care Provider: Osmar Cruz MD  Chief Complaint:  Enrrique Hepatic Mass, RML Pulmonary Mass, Adenopathy, and Bone Lesions     Subjective     Subjective:   HPI:   Kylah Deal is a 73 y.o. female with PMH significant for:   - CAD s/p CABG in 2000 and prior PCI (Plavix)  - DM2 (A1c: 6.8%, linagliptin)  - HTN (lisinopril)  - Possible COPD (PFTs - 2/8/24 - FEV1: 50-79%, mild restriction, decreased DLCO<40%)  - HTN  - Possible Seizure Disorder (Keppra)     She is followed by Medical Oncology as recent imaging performed in evaluation of hypercalcemia revealed a Enrrique Hepatic Mass, RML Pulmonary Mass, Adenopathy, and Bone Lesions, with recent CT guided core needle mediastinal node biopsy revealing focal noncaseating granulomatous inflammation w/o malignancy.  Given continued radiographic concern for malignancy, EUS guided PH mass biopsy was performed on 4/3, she presents today to review results.     HPI:   1/2023: vague RUQ abdominal pain prompting a CT A/P - did not show biliary disease but did reveal opacities in right lung   1/2023: noted to be hypercalcemic (13) during routine labs through [facility] cardiology - hypercalcemia worsened in 1/2024 (14) prompting imaging below  1/13/2024: CT CAP w/ IV contrast: scattered pulmonary nodules - largest is a 4.3 cm Right hilar/RML pulmonary mass w/ occlusion of the RML bronchus, mass in the enrrique hepatis (surrounds the portal vein, CBD dilation, extending into the RP surrounding the aorta/iliac and SMA; tumor does not appear to originate in the pancreas, could be conglomerate node or primary mass, numerous liver lesions c/f metastasis, mesenteric adenopathy, splenic infarcts   1/31/23: Bronchoscopy: PATHOLOGY: RLL lung bx, LLL lung bx, Right mainstem bronchus, BAL, negative for carcinoma or granulomatous disease  2/20/2024: PET/CT: Hypermetabolic  infiltrative mass in the anastacia hepatis/RP (superior/posterior to pancreas, SUV: 7.3, surrounding CBD), lungs, right hilum, mediastinum, pleura, pericardium, liver (small, SUV: 3.4), mesenteric, RP, and pelvic adenopathy, left SC (SUV: 3.7), right neck (SUV: 2.4, inferior to parotid), diffuse interstitial involvement in both lungs (interstitial thickening/nodularity, mildly thickened pleural w/ uptake), bone (right femoral neck, pelvis, spine, scapula, sternum, SUV: 5.8)  3/1/2024: CT guided core needle mediastinal lymph node biopsy - focal noncaseating granulomatous inflammation - results are discordant and remain concerning for malignancy.  3/18/24: CT chest: bilateral perihilar/peribronchial predominant irregular consolidations, stable, findings favored to represent sarcoidosis  3/27/24: Tumor Board: Recommend more tissue (EUS of PH mass vs. Bone) as findings in the lung may represent sarcoidosis but other lesions remain c/f malignancy  4/3/24: EUS: numerous enlarged nodes in middle paraesophageal mediastinum (8M), gastrohepatic ligament (18), and peripancreatic region - largest 1.3 cm, FNA performed (2 passes with 22 gauge needle); 3 cm mass in area of celiac/SMA takeoff (involved with arterial walls) - FNA performed of this mass as well  - no pathology in esophagus, stomach, duodenum, biliary tree, pancreas  PATHOLOGY: reactive histiocytic/myofibroblastic tissue with rare granulomas, no malignancy   4/29/24: Bone marrow biopsy:  Cellular marrow with trilineage hematopoiesis and megakaryocytic hyperplasia, kappa restricted monotypic B lymphocytes (1.2%) and atypical plasma cells (<0.1%) detected by flow cytometric analysis, no amyloid, no granuloma; specimen is suboptimal  -- Comment: flow cytometry detects a kappa light chain restricted b lymphocyte population (1.2% of total sample) expressing CD19 and CD20, MYD88 mutation analysis negative - although no definitive evidence of lymphoma or plasma cell neoplassm  in this sample, b cell lymphoma with plasmacytic differentiation cannot be excluded    Interval History:    Mrs. Deal is overall doing well. She is accompanied today by 2 of her sons. No change in prior sx.     Patient's current symptoms are:  (1) Mild productive cough: chronic x numerous years, unchanged  (2) Fatigue: chronic but progressive over the last year  (3) MAHONEY: new over the last few months, no overt chest pain.   (4) HA: She has occasional new headaches w/o focal neurologic deficits. HA's are mild but regular, no associated visual changes, N/V, etc.  (5) Neck/L shoulder pain - started around Jan 2024 pain is mild, persistent not progressive, intermittent but daily, not currently requiring analgesia. Patient denies trauma, bowel or bladder incontinence, saddle anesthesia, neurologic deficit/weakness - particularly extremity weakness, numbness or tingling. No difficulties with ROM of the shoulder. This has spontaneously resolved since last visit.     Patient otherwise denies fevers, chills, night sweats, chest pain, abdominal pain, N/V, diarrhea, constipation, weight loss, rashes.     Review of Systems:  14-point review of systems was asked with the pertinent positives and/or negatives stated in HPI.     Past Medical History:   - CAD s/p CABG in 2000 and prior PCI (Plavix), T2DM, HTN (lisinopril), COPD (PFTs - 2/8/24 - FEV1: 50-79%, mild restriction, decreased DLCO<40%), GERD, HLD, HTN, Seizure Disorder (Dx in 2020, Keppra), Osteoporosis (on Prolia)    Past Surgical HIstory:  Cholecystectomy, POLINA, appendectomy, Left hip allison    Family History:   - Dad: Lung Cancer (smoker)  - Sister: Breast Cancer (40s)  - Maternal Grandmother: Colon Cancer (70s)  - Paternal Aunt: Colon (50s)  - Maternal Uncle: Cancer NOS    Social History:   Lives: [place]  Recently   Children: Yes   Tobacco use: Former smoker - smoked from her 30s-50s, half pack per week, quit in 2008  Alcohol use: denies   Illicit drug use: denies     reports that she quit smoking about 16 years ago. Her smoking use included cigarettes. She has never used smokeless tobacco. She reports current alcohol use of about 1.0 standard drink of alcohol per week. She reports that she does not use drugs.     Allergies:  Review of patient's allergies indicates:   Allergen Reactions    Atorvastatin calcium Other (See Comments)     Pain    Metformin Diarrhea     Diarrhea and cramping    Rosuvastatin Other (See Comments)     pain    Statins-hmg-coa reductase inhibitors        Medications:  Current Outpatient Medications   Medication Instructions    albuterol (PROVENTIL/VENTOLIN HFA) 90 mcg/actuation inhaler 2 puffs, Inhalation, Every 4 hours PRN    aspirin (ECOTRIN) 81 mg, Oral, Daily    carvediloL (COREG) 12.5 MG tablet 1 tablet, Oral, 2 times daily with meals    cholecalciferol, vitamin D3, (VITAMIN D3) 50 mcg (2,000 unit) Cap capsule 1 tablet, Oral    clopidogreL (PLAVIX) 75 mg tablet 1 tablet, Oral, Daily    diazePAM (VALIUM) 2 mg, Oral, See admin instructions    droNABinol (MARINOL) 2.5 mg, Oral, 2 times daily before meals    evolocumab (REPATHA SURECLICK SUBQ) Subcutaneous    ezetimibe (ZETIA) 10 mg, Oral, Daily    fluticasone (VERAMYST) 27.5 mcg/actuation nasal spray 2 sprays, Nasal, Daily    furosemide (LASIX) 20 mg, Oral, 2 times daily    guaiFENesin-codeine 100-10 mg/5 ml (TUSSI-ORGANIDIN NR)  mg/5 mL syrup 10 mLs, Oral, Every 4 hours PRN    hydrALAZINE (APRESOLINE) 50 MG tablet 1 tablet, Oral, 2 times daily    hydrALAZINE (APRESOLINE) 25 mg, 3 times daily    insulin aspart U-100 (NOVOLOG) 2 Units, Subcutaneous, As needed (PRN)    levETIRAcetam (KEPPRA) 500 MG Tab     lisinopriL (PRINIVIL,ZESTRIL) 40 MG tablet 1 tablet, Oral, Daily    montelukast (SINGULAIR) 10 mg, Oral    nitroGLYCERIN (NITROSTAT) 0.4 mg, Sublingual    pantoprazole (PROTONIX) 40 mg, Oral, Every morning    PROLIA 60 mg, Subcutaneous, Every 6 months    solifenacin (VESICARE) 10 MG tablet 1  tablet, Oral, Daily    TRADJENTA 5 mg, Oral, Daily    TRELEGY ELLIPTA 200-62.5-25 mcg inhaler 1 puff, Inhalation          Objective:   Vitals:   There were no vitals filed for this visit.    BMI: There is no height or weight on file to calculate BMI.  ECOG Performance Status: 1    Physical Exam     General Appearance:    Alert, cooperative, no distress    VIRTUAL VISIT     Laboratory Data:  No visits with results within 1 Week(s) from this visit.   Latest known visit with results is:   Procedure visit on 04/29/2024   Component Date Value Ref Range Status    EXHR Specimen Type 04/29/2024 Bone Marrow   Final    EXHR Extract and Hold Result 04/29/2024 see method   Final    EXHR Extraction 04/29/2024 Performed   Final    Comment: -------------------ADDITIONAL INFORMATION-------------------  DNA and total RNA were extracted from the sample and will   be stored cryopreserved for 1 year from the extraction   date.  To request specific molecular test(s) from the   [facility]'s test catalog, please   contact [facility] at [phone].    Method summary: DNA and RNA were extracted from the   received specimen and stored at -80 C.      This test was developed and its performance characteristics   determined by [facility] in a manner consistent with CLIA   requirements. This test has not been cleared or approved by   the U.S. Food and Drug Administration.    Test Performed by:  [facility] - [address] [address]  : Pravin Pradhan M.D. Ph.D.; CLIA# [id number]      Plasma Cell Prolif Result Summary 04/29/2024 Normal, See Interpretation   Final    Interp, Plasma Cell Prolif (PCPD),* 04/29/2024 SEE BELOW   Final    Comment: The result is within normal limits for 1q duplication, TP53  deletion and IGH rearrangement (see Note).    Note: While the result is within normal limits for  chromosome 1q, only 19 plasma cells were  identified and 50  are usually evaluated; thus, these limited results should  be interpreted with caution.    If not previously performed at diagnosis, the [place]  Stratification for Myeloma and Risk Adapted Therapy  algorithm (mSMART 3.0,  www.msmart.org/mm-treatment-guidelines) incorporating both  FISH and monotypic plasma cell S-phase results can be  performed on a new sample. If interested in this testing,  call [phone].      Plasma Cell Prolif Result Table 04/29/2024 SEE BELOW   Final    Comment: ----------------------------------------------------------   Abnormality Name                 Result    # Abn Total       Cells       -17p13.1(TP53x1,V44L4t5)         Normal    1     45          -17(TP53,D17Z1)x1                Normal    0     45          +1q22(TP73x2,1q22x3)             Normal    1     19          14q32(IGH sep)                   Normal    0     31          ----------------------------------------------------------       Results, Plasma Cell Prolif (PCPD)* 04/29/2024 SEE BELOW   Final    Interphase FISH is normal for all loci studied.    Reason for Referral, Plasma Cell P* 04/29/2024 plasma cell dyscrasia   Final    Specimen, Plasma Cell Prolif (PCPD* 04/29/2024 Bone Marrow   Final    Source, Plasma Cell Prolif (PCPD),* 04/29/2024 Test Not Performed   Final    Method, Plasma Cell Prolif (PCPD),* 04/29/2024 SEE BELOW   Final    Comment: Locus and probes                  [Strategy;#Nuclei;Vendor]  -----------------------------------------------------------  1p36.3(TP73),1q22(1q22)                      [COPY#;50;LDT]  14q32(3'IGH,5'IGH)                             [BAP;50;LDT]  17p13(TP53),17CEN(D17Z1)                      [COPY#;50;AM]    Probe strategies include:  BAP=break-apart probe;  COPY#=region gain and loss.  Scoring Method: Manual    Probe vendors include:  T = [facility] [employer]  AM = [employer] ([place], [place])      Plasma Cell Prolif Additional Info* 04/29/2024 SEE BELOW    Final    Comment: A portion of the testing process was performed at [facility] site #[id number] and [id number].      Plasma Cell Prolif Disclaimer 04/29/2024 SEE BELOW   Final    Comment: Applicable to Analyte Specific Reagent (ASR) and Laboratory  Developed Tests (LDT). This test was developed and its  performance characteristics determined by [facility] in a  manner consistent with CLIA requirements. It has not been  cleared or approved by the U.S. Food and Drug  Administration. This FISH test does not rule out other  chromosome abnormalities.      Plasma Cell Prolif Released by 04/29/2024 Chi Badillo M.D.   Final    Comment: Test Performed by:  [facility] [place] [address]  : Pravin Pradhan M.D. Ph.D.; CLIA# [id number]      Clinical Diagnosis - Bone Marrow 04/29/2024 PD   Final    Body Site - Bone Marrow 04/29/2024 RPI   Final    Bone Marrow Interpretation 04/29/2024    Final                    Value:1. Kappa restricted monotypic B lymphocytes (1.2% of total sample).  2. Atypical plasma cells (<0.1% of total sample).      Interp By Gurinder Simpson M.D., Signed on 05/06/2024 at 15:44    Bone Marrow Antibodies Analyzed 04/29/2024 All analyzed: CD2, CD3, CD4, CD5, CD7, CD8, CD10, CD13, CD19, CD20, CD34, CD38, CD56, , , KAPPA, Kappa Cytolasmic, LAMBDA, Lambda Cytoplasmic,CD45 and 7AAD.   Final    Bone Marrow Comment 04/29/2024    Final                    Value:Flow cytometric analysis of bone marrow detects a kappa light chain restricted B lymphocyte population (1.2% of total sample) expressing CD19 and CD20.   CD10 and CD5 are negative, as are hairy cell markers.  T lymphocytes are immunophenotypically   unremarkable. In addition, there is a minute group of atypical plasma cells (<0.1% of total sample) showing expression of CD56 and kappa light chain. Correlation with marrow morphology is required.  Flow differential:   Lymphocytes 6.4%, Monocytes 7.5%, Granulocytes  70.1%, Blast  0.9%, Debris/nRBC 0.6%,  Viability 99.5%.      Comment: This test was developed and its performance characteristics   determined by [facility].    It has not been cleared or approved by the U.S. Food and Drug   Administration. The FDA has determined that such clearance or   approval is not necessary.  This test is used for clinical purposes.    It should not be regarded as investigational or for research.  This   laboratory is certified under CLIA-88 as qualified to perform high   complexity clinical laboratory testing.      Final Pathologic Diagnosis 04/29/2024    Final                    Value:BONE MARROW, LEFT ILIAC CREST (ASPIRATE SMEAR, TOUCH IMPRINT, CLOT SECTION, AND CORE BIOPSY):  -- CELLULAR MARROW WITH TRILINEAGE HEMATOPOIESIS AND MEGAKARYOCYTIC HYPERPLASIA.  -- KAPPA RESTRICTED MONOTYPIC B LYMPHOCYTES (1.2%) AND ATYPICAL PLASMA CELLS (<0.1%) DETECTED BY FLOW CYTOMETRIC ANALYSIS.  -- NO MORPHOLOGIC EVIDENCE OF AMYLOID DEPOSITION.  -- NO GRANULOMA.  -- SEE COMMENT.      Interp By Gurinder Simpson M.D., Signed on 05/06/2024 at 15:58    Gross 04/29/2024    Final                    Value:Hospital/clinic label MRN:  [id number]  Pathology label MRN:  [id number]    Two specimens are received in a container filled with formalin labeled &quot;left clot + core&quot;.     Specimen 1:  The specimen consists of one fragment of red-brown hemorrhagic material measuring 2.5 x 1.7 x 1.2 cm. The specimen is serially sectioned and submitted entirely in cassettes [id number], [id number], and [id number].    Specimen 2:   The specimen consists of multiple tan-pink fragments of bone measuring 0.9 x 0.7 x 0.5 cm in aggregate. The specimen is placed in Immunocal and submitted entirely in cassette [id number].    Kulwant Wiggins,  Gross Technologist      Microscopic Exam 04/29/2024    Final                    Value:BONE  MARROW TOUCH IMPRINT DIFFERENTIAL COUNT:  Blasts-----------------------------------------------0.7 %  Promyelocytes-----------------------------------0.3 %  Myelocytes----------------------------------------3.0 %  Metamyelocytes---------------------------------11.0 %  Bands-----------------------------------------------13.3 %  PMN Neutrophils--------------------------------27.0 %  Eosinophils---------------------------------------2.3 %  Basophils------------------------------------------0.0 %  Monocytes----------------------------------------4.7 %  Lymphocytes-------------------------------------5.0 %  Plasma cells--------------------------------------1.7 %  Erythroid precursors---------------------------31.0 %    BONE MARROW ASPIRATE SMEAR:  The smears contain small hypocellular spicules and hemodiluted.  It is suboptimal for morphologic evaluation.  Stainable iron is present.  Ring sideroblasts are not identified.    BONE MARROW TOUCH IMPRINT:  The touch imprint is cellular.  My                          eloid to erythroid ratio is approximately 2:1.  Myeloid and erythroid precursors display orderly maturation.  Blasts are not increased.  Megakaryocytes are present and morphologically unremarkable.  Plasma cells and   lymphocytes are not increased and morphologically unremarkable.     BONE MARROW CLOT SECTION:  The clot sections contain small cellular spicules with cellular composition similar to the biopsy core.  Stainable iron is present.    BONE MARROW CORE BIOPSY:  Small fragments of cortical and medullary bones are present.  The sample is limited and suboptimal for morphologic evaluation.  The cellularity is approximately 40%. The myeloid to erythroid ratio is unremarkable. Megakaryocytes are present and   morphologically unremarkable.  Plasma cells are not increased.  Lymphoid aggregates or granulomas are not identified.  Stainable iron is present.  Congo red stain fails to detect amyloid deposits.       Comment 04/29/2024    Final                    Value:Flow cytometric analysis of bone marrow detects a kappa light chain restricted B lymphocyte population (1.2% of total sample) expressing CD19 and CD20.   CD10 and CD5 are negative, as are hairy cell markers.  T lymphocytes are immunophenotypically   unremarkable. In addition, there is a minute group of atypical plasma cells (<0.1% of total sample) showing expression of CD56 and kappa light chain. Flow differential:  Lymphocytes 6.4%, Monocytes 7.5%, Granulocytes  70.1%, Blast  0.9%, Debris/nRBC   0.6%,  Viability 99.5%.     Immunohistochemical stains were performed on the biopsy core and clot section for greater sensitivity and further architectural assessment with adequate controls.  CD3 and CD20 stains scattered T and B lymphocytes, respectively.  -positive plasma   cells are not significantly increased (1%).    MYD88 mutation analysis ([facility]) was negative.    Per electronic medical record, patient has history of liver mass, lung mass, lymphadenopathy with non                          caseating granuloma, and bone lesions. Recent serum protein electrophoresis and immunofixation (03/18/2024) showed IgM lambda monoclonal bands.  Flow   cytometric analysis detected kappa restricted B-cells and atypical plasma cells.  Although there is no definitive morphologic evidence of marrow involvement by B-cell lymphoma or plasma cell neoplasm, a B-cell lymphoma with plasmacytic differentiation   cannot be excluded.  Differential considerations also include monoclonal B-cell lymphocytosis of non-CLL type.  Correlation with clinical presentation and other lab results is required.    Of note, the specimen is suboptimal.  If clinically indicated, repeat bone marrow aspiration and biopsy is recommended.      Disclaimer 04/29/2024    Final                    Value:Unless the case is a 'gross only' or additional testing only, the final diagnosis for each  specimen is based on a microscopic examination of appropriate tissue sections.  CD20 (L26) immunohistochemical staining (close L26, DAB detection method) is performed on formalin-fixed (10% neutral buffered formalin), paraffin embedded tissues sections. The presence of an appropriately colored reaction product within the target   cells is indicative of positive reactivity. Positive staining intensity should be assessed within the context of any background staining of the negative reagent control. This test was developed and performance characteristics determined by [facility], Section of Anatomic Pathology. It has been cleared by the U.S. Food and Drug Administration.       [facility] Pre-Analysis Pre-Sort 04/29/2024 Performed   Final    Comment: -------------------ADDITIONAL INFORMATION-------------------  Flow cytometric cell selection was performed with   antibodies to the following antigens: CD19, CD38, CD45,   CD56, , and .    Specimen enrichment for certain cell types is necessary, in   order to enhance the sensitivity of genetic/molecular   abnormalities detection in the cell population of interest,   and to avoid unwanted contamination from other cell types.    Flow cytometric cell sorting is the most direct and robust   method of obtaining a pure population for subsequent   genetic/molecular analysis, through assessment of a   characteristic combination of cell surface antigens.  This test was developed and its performance characteristics   determined by [facility] in a manner consistent with CLIA   requirements. This test has not been cleared or approved by   the U.S. Food and Drug Administration.    Test Performed by:  [place] [address]                           [address]  : Parvin Pradhan M.D. Ph.D.; CLIA# [id number]      [facility] Final Diagnosis 04/29/2024 Test Not Performed   Final    [place] Miscellaneous  Result 04/29/2024 SEE COMMENTS   Final    Comment: Test                          Result      Flag  Unit  RefValue  --------------------------------------------------------------  Reflex Testing of MYD88 and CXCR4  Specimen Type               bone marrow                       LPLFX Reflex Result         see interpretation  Final Diagnosis                                               Bone marrow, MYD88 L265P Mutation Analysis, Allele-specific   PCR:    Negative for MYD88 L265P alteration.    Comment: The MYD88 L265P abnormality is highly associated   (>90%) with the pathogenic diagnosis of lymphoplasmacytic   lymphoma and the clinical syndrome of Waldenstrom   macroglobulinemia (LPL/WM), particularly in the setting of   an elevated IgM serum monoclonal paraprotein. The MYD88   L265P mutation is also identified in some diffuse large   B-cell lymphomas (DLBL), with a higher prevalence observed   in &quot;immune privileged&quot; sites (e.g. central nervous system,   testis, breast, skin). A negative molecular result makes   the specific di                           agnosis of LPL/WM less likely; however,   these results do not necessarily exclude the possibility of   lymphoma in this specimen. Clinical, laboratory and   pathologic correlation is required for final diagnosis and   interpretation of these results. The analytical sensitivity   of this assay is approximately 1% mutation detection in a   wild type background.    Further testing of CXCR4 is not performed per testing   algorithm. If testing of CXCR4 is desired, please call [facility] ([phone]) to add the test CXLPL   within a month from the sample receive date.    Method Summary: DNA was extracted from the specimen and   subjected to allele-specific polymerase chain reaction   (PCR) using an allele specific forward primer that   distinguishes the presence of the MYD88 L265P mutation. The   reaction also contains a wild-type forward 5' of  the   mutation site and a single reverse primer.  Expected   fragment lengths of PCR products are interpreted using   capillary el                           ectrophoresis.    Signing Pathologist: Thad Connell, Ph.D.    -------------------ADDITIONAL INFORMATION-------------------  This test was developed and its performance characteristics   determined by [facility] in a manner consistent with CLIA   requirements. This test has not been cleared or approved by   the U.S. Food and Drug Administration.    Test Performed by:  [facility] - [address] [address]  : Pravin Pradhan M.D. Ph.D.; CLIA# [id number]       Recent Labs   Lab Result Units 24  1106 24  1251   WBC K/uL 6.45 6.19   Hemoglobin g/dL 12.1 11.0*   Hematocrit % 37.3 34.7*   Platelets K/uL 332 297     Recent Labs   Lab Result Units 24  1106 24  1251   Creatinine mg/dL 0.9 1.2   AST U/L 21 15   ALT U/L 20 11     Recent Labs   Lab Result Units 24  1106   Iron ug/dL 46   Ferritin ng/mL 216        Imagin2024: CT CAP w/ IV contrast: scattered pulmonary nodules - largest is a 4.3 cm Right hilar/RML pulmonary mass w/ occlusion of the RML bronchus, mass in the anastacia hepatis (surrounds the portal vein, CBD dilation, extending into the RP surrounding the aorta/iliac and SMA; tumor does not appear to originate in the pancreas, could be conglomerate node or primary mass, numerous liver lesions c/f metastasis, mesenteric adenopathy, splenic infarcts     2024: PET/CT: Hypermetabolic infiltrative mass in the anastacia hepatis/RP (superior/posterior to pancreas, SUV: 7.3, surrounding CBD), lungs, right hilum, mediastinum, pleura, pericardium, liver (small, SUV: 3.4), mesenteric, RP, and pelvic adenopathy, left SC (SUV: 3.7), right neck (SUV: 2.4, inferior to parotid), diffuse interstitial involvement in both lungs (interstitial thickening/nodularity, mildly  "thickened pleural w/ uptake), bone (right femoral neck, pelvis, spine, scapula, sternum, SUV: 5.8)    3/18/24: CT chest w/o contrast: stable bilateral perihilar/peribronchial predominant irregular consolidations, mediastinal irregular peripheral soft tissue densities, small left pleural effusion, findings favored to represent sarcoidosis    Pathology:   3/1/2024: CT guided core needle mediastinal lymph node biopsy - focal noncaseating granulomatous inflammation, "one of the fragments show a few small non-caseating granulomas, no evidence of malignancy"    4/3/24: EUS: numerous enlarged nodes in middle paraesophageal mediastinum (8M), gastrohepatic ligament (18), and peripancreatic region - largest 1.3 cm, FNA performed (2 passes with 22 gauge needle); 3 cm mass in area of celiac/SMA takeoff (involved with arterial walls) - FNA performed of this mass as well  - no pathology in esophagus, stomach, duodenum, biliary tree, pancreas  PATHOLOGY: reactive histiocytic/myofibroblastic tissue with rare granulomas, no malignancy     24:   Bone marrow biopsy:  Cellular marrow with trilineage hematopoiesis and megakaryocytic hyperplasia, kappa restricted monotypic B lymphocytes (1.2%) and atypical plasma cells (<0.1%) detected by flow cytometric analysis, no amyloid, no granuloma; specimen is suboptimal  -- Comment: flow cytometry detects a kappa light chain restricted b lymphocyte population (1.2% of total sample) expressing CD19 and CD20, MYD88 mutation analysis negative - although no definitive evidence of lymphoma or plasma cell neoplassm in this sample, b cell lymphoma with plasmacytic differentiation cannot be excluded    Labs:   - CBC: wnl CMP: Calcium: 11.9 (10.5), albumin: 3.6, Smear: wnl, ferritin: 216, TSAT: 13%  - SPEP: M protein: 0.21 g/dL, IgM lambda monoclonal band, KF.36, ratio: 1.13  - Beta 2 microglobulin: 4.5, uric acid: 2.7, LDH: 148  - : 83, , CEA and AFP: wnl  - HIV and hepatitis " testing: negative      Assessment   Assessment and Plan:   Kylah Deal is a 73 y.o. female former smoker with CAD s/p CABG, DM2, HTN, possible COPD, HTN, Possible Seizure Disorder (Keppra). She is followed by Medical Oncology as imaging performed in evaluation of hypercalcemia revealed a Enrrique Hepatic Mass, RML Pulmonary Mass, Adenopathy, and Bone Lesions, with CT guided core needle mediastinal node biopsy on 3/1/24 revealing focal noncaseating granulomatous inflammation c/f sarcoidosis, no overt malignancy. Given continued radiographic concern for malignancy, EUS guided PH mass biopsy was performed on 4/3 with rare granulomas, also negative for malignancy. Given bone lesions, SPEP performed and IgM lambda monoclonal protein detected, so bone marrow biopsy was performed on 4/29. She presents today to review results.    # RML Pulmonary Mass  # Enrrique Hepatic Mass  # Adenopathy  # Bone Lesions   - Reviewed outside CT, [place] [facility] CT chest from 3/18/24 showing a enrrique hepatic mass, RML pulmonary mass with occlusion of the RML bronchus, scattered adenopathy, and bone lesions, all of which are hypermetabolic on PET. She has now had 4 biopsies including CT guided core needle mediastinal node biopsy on 3/1/2024 revealing a small fragment of noncaseating granulomas w/o malignancy. a bronch with biopsies in [place] [facility] w/ negative results.  Reviewed at tumor board with recommendation for additional tissue as findings in the lung may represent sarcoidosis but other lesions remain c/f malignancy so PH mass biopsied and negative, and now bone marrow biopsy.   - Bone marrow biopsy without overt evidence of lymphoma or plasma cell neoplasm. However, there is note of kappa restricted monotypic B lymphocytes (1.2%) and atypical plasma cells, MYD88 mutational analysis negative but LPL or Waldenstrom macroglobulinemia, (indolent form of lymphoma) cannot be excluded based on current limited  sample  - She presumably has sarcoidosis in her lung; but given extrapulmonary disease on imaging, LPL a possibility. Will review with Dr. Echevarria from hematology regarding next steps including bone biopsy.     # Other Co-Morbidities:  - Bone lesions: MRI neck/shoulder on 3/27 revealing lesions in cervical, upper thoracic, and right medial clavicle (no extraosseous extension or pathologic fracture), pain has resolved, pending above  - Hypercalcemia: likely related to either sarcoidosis or malignancy as above, s/p denosumab in 2024, CTM  - IgM monoclonal protein: SPEP: M protein: 0.21 g/dL, IgM lambda monoclonal band, KF.36, ratio: 1.13 - see above  - Left shoulder pain: MRI shoulder on 3/27 revealing intramedullary lesions at the base of the acromion - sequela of skeletal sarcoidosis vs. metastatic disease not excluded, other: supraspinatus tendinosis, posterosuperior labral tear, AC joint arthrosis, subacromial/subdeltoid bursitis - spontaneously resolved   - CAD s/p CABG (Plavix), DM2 (linagliptin), HTN (lisinopril), COPD (PFTs - 24 - FEV1: 50-79%, mild restriction, decreased DLCO<40%), HTN, Possible Seizure Disorder (Keppra): stable on current regimen, per PCP (outside of [facility])  - Genetics: pending above, consider genetic testing given strong FH  - Other Cancer Screening: Colonoscopy: 3/22/2019, MM2022, Pap: POLINA in     Follow Up:  - RTC pending hematology discussion  [] liquid biopsy, genetics, prolia (continue to follow calcium at regular interval), orthopedics, ACE level    MDM includes:    - Acute or chronic illness or injury that poses a threat to life or bodily function  - Consideration and discussion of significant complications based on comorbidities  - Review of prior external notes from unique source and review of diagnostic tests and information  - Independent review and explanation of 3+ results from unique tests   - Discussion of management and ordering 3+ unique tests   -  Extensive discussion of treatment and management including consideration of possible diagnoses and management options  - Prescription drug management  - Drug therapy requiring intensive monitoring for toxicity    - Patient seen in diagnosis clinic.   - Overall, I discussed the diagnosis, history, stage, labs/imaging, prognosis, management, and treatment plan as applicable. I reviewed adverse short and long term effects as applicable.   - Informed patient if symptoms are getting worse that it is their responsibility to call the clinic and schedule follow up sooner than stated follow up. Also informed patient if they do not hear from the appointment center in 2-5 business days for their referrals, the patient must call the Oncology clinic so we can follow up on procedures or referral scheduling. Patient was fully informed of current medical plan, all questions were answered and patient verbalized understanding. No further questions.   - Face to Face Visit time I spent with the patient: 40 minutes of total time spent on the encounter, including counseling patient and/or coordinating care, which includes face to face time and non-face to face time preparing to see the patient (eg, review of tests), Obtaining and/or reviewing separately obtained history, Documenting clinical information in the electronic or other health record, Independently interpreting results (not separately reported) and communicating results to the patient/family/caregiver, or Care coordination (not separately reported).   - Telephone/Video Visit:  This is a telephone/video visit that took place. The patient location is: home/car. The patient understanding of these limitations and that there is an increased risk of incomplete diagnosis and potential for incomplete or inaccurate discussion of treatment options. With that understanding , they are instructed that if their problem isn't resolving they will schedule an in person visit. Each patient to  whom he or she provides medical services by telemedicine is:  (1) informed of the relationship between the physician and patient and the respective role of any other health care provider with respect to management of the patient; and (2) notified that he or she may decline to receive medical services by telemedicine and may withdraw from such care at any time.     Signed   Karla Sanchez MD  [facility]

## 2024-05-09 ENCOUNTER — TELEPHONE (OUTPATIENT)
Dept: HEMATOLOGY/ONCOLOGY | Facility: CLINIC | Age: 74
End: 2024-05-09
Payer: MEDICARE

## 2024-05-09 DIAGNOSIS — M89.9 BONE LESION: Primary | ICD-10-CM

## 2024-05-09 DIAGNOSIS — R63.0 DECREASE IN APPETITE: Primary | ICD-10-CM

## 2024-05-09 LAB
COMMENT: NORMAL
FINAL PATHOLOGIC DIAGNOSIS: NORMAL
GROSS: NORMAL
Lab: NORMAL
MICROSCOPIC EXAM: NORMAL
SUPPLEMENTAL DIAGNOSIS: NORMAL

## 2024-05-09 RX ORDER — MIRTAZAPINE 7.5 MG/1
7.5 TABLET, FILM COATED ORAL NIGHTLY
Qty: 30 TABLET | Refills: 1 | Status: SHIPPED | OUTPATIENT
Start: 2024-05-09 | End: 2024-06-13

## 2024-05-10 ENCOUNTER — TELEPHONE (OUTPATIENT)
Dept: HEMATOLOGY/ONCOLOGY | Facility: CLINIC | Age: 74
End: 2024-05-10
Payer: MEDICARE

## 2024-05-10 ENCOUNTER — HOSPITAL ENCOUNTER (OUTPATIENT)
Dept: RADIOLOGY | Facility: HOSPITAL | Age: 74
Discharge: HOME | End: 2024-05-10
Attending: INTERNAL MEDICINE
Payer: MEDICARE

## 2024-05-10 DIAGNOSIS — M89.9 BONE LESION: ICD-10-CM

## 2024-05-10 PROCEDURE — 71260 CT THORAX DX C+: CPT | Mod: 26,,, | Performed by: RADIOLOGY

## 2024-05-10 PROCEDURE — 74177 CT ABD & PELVIS W/CONTRAST: CPT | Mod: TC

## 2024-05-10 PROCEDURE — 74177 CT ABD & PELVIS W/CONTRAST: CPT | Mod: 26,,, | Performed by: RADIOLOGY

## 2024-05-10 PROCEDURE — 25500020 PHARM REV CODE 255: Performed by: INTERNAL MEDICINE

## 2024-05-10 RX ADMIN — IOHEXOL 15 ML: 300 INJECTION, SOLUTION INTRAVENOUS at 02:05

## 2024-05-10 RX ADMIN — IOHEXOL 75 ML: 350 INJECTION, SOLUTION INTRAVENOUS at 02:05

## 2024-05-10 NOTE — TELEPHONE ENCOUNTER
----- Message from Parminder Romero sent at 5/10/2024 12:50 PM CDT -----      Name of Who is Calling: TAI REDDY [[id number]]      What is the request in detail: Lab called in regarding critical lab for Calcium.Please contact to further discuss and advise.          Can the clinic reply by [facility]: N      What Number to Call Back if not in [facility]: Lab [phone]

## 2024-05-16 DIAGNOSIS — E83.52 HYPERCALCEMIA: ICD-10-CM

## 2024-05-16 DIAGNOSIS — R91.8 LUNG MASS: Primary | ICD-10-CM

## 2024-05-16 DIAGNOSIS — R59.9 ADENOPATHY: Primary | ICD-10-CM

## 2024-05-22 ENCOUNTER — DOCUMENTATION ONLY (OUTPATIENT)
Dept: HEMATOLOGY/ONCOLOGY | Facility: CLINIC | Age: 74
End: 2024-05-22
Payer: MEDICARE

## 2024-05-22 ENCOUNTER — TELEPHONE (OUTPATIENT)
Dept: HEMATOLOGY/ONCOLOGY | Facility: CLINIC | Age: 74
End: 2024-05-22
Payer: MEDICARE

## 2024-05-22 ENCOUNTER — LAB VISIT (OUTPATIENT)
Dept: LAB | Facility: HOSPITAL | Age: 74
End: 2024-05-22
Attending: INTERNAL MEDICINE
Payer: MEDICARE

## 2024-05-22 DIAGNOSIS — E83.52 HYPERCALCEMIA: ICD-10-CM

## 2024-05-22 LAB
ALBUMIN SERPL BCP-MCNC: 3.4 G/DL (ref 3.5–5.2)
ALP SERPL-CCNC: 76 U/L (ref 55–135)
ALT SERPL W/O P-5'-P-CCNC: 13 U/L (ref 10–44)
ANION GAP SERPL CALC-SCNC: 8 MMOL/L (ref 8–16)
AST SERPL-CCNC: 15 U/L (ref 10–40)
BASOPHILS # BLD AUTO: 0.02 K/UL (ref 0–0.2)
BASOPHILS NFR BLD: 0.3 % (ref 0–1.9)
BILIRUB SERPL-MCNC: 0.3 MG/DL (ref 0.1–1)
BUN SERPL-MCNC: 25 MG/DL (ref 8–23)
CA-I BLDV-SCNC: 1.39 MMOL/L (ref 1.06–1.42)
CALCIUM SERPL-MCNC: 12.2 MG/DL (ref 8.7–10.5)
CHLORIDE SERPL-SCNC: 104 MMOL/L (ref 95–110)
CO2 SERPL-SCNC: 29 MMOL/L (ref 23–29)
CREAT SERPL-MCNC: 1.3 MG/DL (ref 0.5–1.4)
DIFFERENTIAL METHOD BLD: ABNORMAL
EOSINOPHIL # BLD AUTO: 0.3 K/UL (ref 0–0.5)
EOSINOPHIL NFR BLD: 4.4 % (ref 0–8)
ERYTHROCYTE [DISTWIDTH] IN BLOOD BY AUTOMATED COUNT: 13.7 % (ref 11.5–14.5)
EST. GFR  (NO RACE VARIABLE): 43 ML/MIN/1.73 M^2
GLUCOSE SERPL-MCNC: 149 MG/DL (ref 70–110)
HCT VFR BLD AUTO: 35 % (ref 37–48.5)
HGB BLD-MCNC: 11.1 G/DL (ref 12–16)
IMM GRANULOCYTES # BLD AUTO: 0.02 K/UL (ref 0–0.04)
IMM GRANULOCYTES NFR BLD AUTO: 0.3 % (ref 0–0.5)
LYMPHOCYTES # BLD AUTO: 1 K/UL (ref 1–4.8)
LYMPHOCYTES NFR BLD: 13.8 % (ref 18–48)
MCH RBC QN AUTO: 27.8 PG (ref 27–31)
MCHC RBC AUTO-ENTMCNC: 31.7 G/DL (ref 32–36)
MCV RBC AUTO: 88 FL (ref 82–98)
MONOCYTES # BLD AUTO: 0.9 K/UL (ref 0.3–1)
MONOCYTES NFR BLD: 12.5 % (ref 4–15)
NEUTROPHILS # BLD AUTO: 4.9 K/UL (ref 1.8–7.7)
NEUTROPHILS NFR BLD: 68.7 % (ref 38–73)
NRBC BLD-RTO: 0 /100 WBC
PLATELET # BLD AUTO: 277 K/UL (ref 150–450)
PMV BLD AUTO: 11.8 FL (ref 9.2–12.9)
POTASSIUM SERPL-SCNC: 4.7 MMOL/L (ref 3.5–5.1)
PROT SERPL-MCNC: 7.1 G/DL (ref 6–8.4)
RBC # BLD AUTO: 3.99 M/UL (ref 4–5.4)
SODIUM SERPL-SCNC: 141 MMOL/L (ref 136–145)
WBC # BLD AUTO: 7.11 K/UL (ref 3.9–12.7)

## 2024-05-22 PROCEDURE — 82330 ASSAY OF CALCIUM: CPT | Performed by: INTERNAL MEDICINE

## 2024-05-22 PROCEDURE — 80053 COMPREHEN METABOLIC PANEL: CPT | Performed by: INTERNAL MEDICINE

## 2024-05-22 PROCEDURE — 85025 COMPLETE CBC W/AUTO DIFF WBC: CPT | Performed by: INTERNAL MEDICINE

## 2024-05-22 PROCEDURE — 36415 COLL VENOUS BLD VENIPUNCTURE: CPT | Performed by: INTERNAL MEDICINE

## 2024-05-22 NOTE — TELEPHONE ENCOUNTER
Spoke to patient, nothing new is going on. She did have some bleeding when changing her dexcom today, recommended she reach out to the provider who managers this to see if they have any insight to why this may happen. Reassured her that her CBC looks stable and no reason she should be bleeding.  She knows to go directly to the ED is she develops any sort of numbness, muscle spasm or weakness, or if her family notices that she is confused. She verbalized

## 2024-05-22 NOTE — TELEPHONE ENCOUNTER
Spoke to Dr. Aguilar, who recommends patient come into ed or out patient infusion for hydration. Attempted to reach patient by phone, no answer, left voicemail for her to call back.

## 2024-05-22 NOTE — TELEPHONE ENCOUNTER
"CALLED PT. Labs scheduled at  as requested.         ----- Message from Satish Ambrose sent at 5/22/2024 10:52 AM CDT -----  Consult/Advisory    Name Of Caller: Self    Contact Preference?: [phone]     Provider Name: Laura    Does patient feel the need to be seen today? No    What is the nature of the call?: Requesting orders to have an NFL @ [facility] today     Additional Notes:  "Thank you for all that you do for our patients"  "

## 2024-05-23 ENCOUNTER — LAB VISIT (OUTPATIENT)
Dept: LAB | Facility: HOSPITAL | Age: 74
End: 2024-05-23
Payer: MEDICARE

## 2024-05-23 ENCOUNTER — OFFICE VISIT (OUTPATIENT)
Dept: INTERVENTIONAL RADIOLOGY/VASCULAR | Facility: CLINIC | Age: 74
End: 2024-05-23
Payer: MEDICARE

## 2024-05-23 VITALS
HEART RATE: 75 BPM | SYSTOLIC BLOOD PRESSURE: 188 MMHG | DIASTOLIC BLOOD PRESSURE: 87 MMHG | BODY MASS INDEX: 18.89 KG/M2 | HEIGHT: 61 IN | WEIGHT: 100.06 LBS

## 2024-05-23 DIAGNOSIS — K76.9 LIVER LESION: Primary | ICD-10-CM

## 2024-05-23 DIAGNOSIS — K76.9 LIVER LESION: ICD-10-CM

## 2024-05-23 LAB
INR PPP: 0.9 (ref 0.8–1.2)
PROTHROMBIN TIME: 10.3 SEC (ref 9–12.5)

## 2024-05-23 PROCEDURE — 99999 PR PBB SHADOW E&M-EST. PATIENT-LVL IV: CPT | Mod: PBBFAC,,, | Performed by: FAMILY MEDICINE

## 2024-05-23 PROCEDURE — 3077F SYST BP >= 140 MM HG: CPT | Mod: CPTII,S$GLB,, | Performed by: FAMILY MEDICINE

## 2024-05-23 PROCEDURE — 1160F RVW MEDS BY RX/DR IN RCRD: CPT | Mod: CPTII,S$GLB,, | Performed by: FAMILY MEDICINE

## 2024-05-23 PROCEDURE — 3079F DIAST BP 80-89 MM HG: CPT | Mod: CPTII,S$GLB,, | Performed by: FAMILY MEDICINE

## 2024-05-23 PROCEDURE — 85610 PROTHROMBIN TIME: CPT | Performed by: FAMILY MEDICINE

## 2024-05-23 PROCEDURE — 1159F MED LIST DOCD IN RCRD: CPT | Mod: CPTII,S$GLB,, | Performed by: FAMILY MEDICINE

## 2024-05-23 PROCEDURE — 36415 COLL VENOUS BLD VENIPUNCTURE: CPT | Performed by: FAMILY MEDICINE

## 2024-05-23 PROCEDURE — 99214 OFFICE O/P EST MOD 30 MIN: CPT | Mod: S$GLB,,, | Performed by: FAMILY MEDICINE

## 2024-05-23 NOTE — LETTER
May 23, 2024    Kylah Say  [address] [place] [address]     [facility]  [address]  [place] [address]  Phone: [phone] PRE-PROCEDURE INSTRUCTIONS    Your procedure with Interventional Radiology is scheduled for _______________________.     Please arrive by _______________________. Please note your appointment time in [facility] will be different.    You must check-in and receive a wristband before going to your procedure. We will call you the day before to let you know your check-in location.    DO NOT take aspirin for 5 days before your procedure.  DO NOT take clopidogrel (plavix) for 5 days before your procedure.    **Do not eat or drink anything between midnight and the time of your procedure. This includes gum, mints, and candy lemon drops.    **Do not smoke or drink alcoholic beverages 24 hours prior to your procedure.    **If you wear contact lenses, dentures, hearing aids, or glasses, bring a container to put them in during the procedure and give them to a family member for safekeeping.    **If you have been diagnosed with sleep apnea please bring your CPAP machine.    **If your doctor has scheduled you for an overnight stay, bring a small overnight bag with any personal items that you may need.    **Make arrangements in advance for transportation home by a responsible adult. It is not safe to drive a vehicle during the 24 hours following the procedure.    **All [facility] facilities and properties are tobacco free. Smoking is NOT allowed.    PLEASE NOTE: The procedure schedule has many variables which affect the time of your procedure. Family members should be available if your surgery time changes.    If you have any questions about these instructions call Interventional Radiology at [phone] Monday - Friday between 8:00am and 4:00pm or [phone] (ask for interventional radiology physician) for after hours.

## 2024-05-23 NOTE — PROGRESS NOTES
"Subjective     Patient ID: Kylah Deal is a 73 y.o. female.    Chief Complaint: Lesion    Patient referred to Interventional Radiology by Karla Sanchez MD for evaluation of liver lesions. Liver lesions were identified during workup for hypercalcemia. A PET scan obtained on 2/20/2024 noted "Diffuse metastatic disease, without specific origin suggested" and "There are multiple scattered small metabolic lesions throughout the right and left hepatic lobes." CT guided biopsy of the hypermetabolic lymph node in the left anterior mediastinum near the diaphragm was performed on 3/1/2024. Pathology found "focal noncaseating granulomatous inflammation" and noted "sarcoid should be considered."     She underwent EUS guided anastacia hepatis mass biopsy on 4/3/2024. Pathology found "bland reactive histiocytic/myofibroblastic tissue with rare granulomas and benign gastrointestinal elements. No evidence of malignancy."     A bone marrow biopsy of the left iliac crest obtained on 4/29/2024 noted "cellular marrow with triliniage hematopoiesis and megakaryocytic hyperplasia, kappa restricted monotypic B lymphocytes (1.2%) and atypical plasma cells (<0.1%) detected by flow cytometric analysis, no morphologic evidence of amyloid deposition, no granuloma."    A CT of C/A/P obtained on 5/10/2024 confirmed "Innumerable subcentimeter hepatic hypodensities are similar to prior and not able to be further characterized."    Patient has complaints of decreased appetite and activity level, fatigue, and dyspnea with exertion. She is accompanied by her son and daughter-in-law.      Review of Systems   Constitutional:  Positive for activity change, appetite change (since December 2023) and fatigue (since October 2023; worsening since January). Negative for chills and fever.   Respiratory:  Positive for cough (intermittent) and shortness of breath (with exertion including speaking). Negative for wheezing and stridor.    Cardiovascular:  Negative " for chest pain, palpitations and leg swelling.   Gastrointestinal:  Positive for abdominal distention (sometimes) and constipation (OTC stool softener helps; and miralax). Negative for abdominal pain, diarrhea, nausea and vomiting.          Objective     Physical Exam  Constitutional:       General: She is not in acute distress.     Appearance: She is well-developed. She is not diaphoretic.   HENT:      Head: Normocephalic and atraumatic.   Pulmonary:      Effort: Pulmonary effort is normal. No respiratory distress.   Neurological:      Mental Status: She is alert and oriented to person, place, and time.   Psychiatric:         Behavior: Behavior normal.         Thought Content: Thought content normal.         Judgment: Judgment normal.       Reviewed oncology progress note    PET               CT C/A/P 5/10/2024             Assessment and Plan     1. Liver lesion  -     IR Biopsy Lung w/ guidance; Future; Expected date: 05/23/2024  -     Protime-INR; Future; Expected date: 05/23/2024        Discussed case with Dr. Schaffer. Explained to patient can offer biopsy of any hypodensity seen in the liver. Discussed how the procedure will be performed, risks (including, but not limited to, pain, bleeding, infection, damage to nearby structures, and the need for additional procedures), benefits, possible complications, pre-post procedure expectations, and alternatives. Discussed risk of non-diagnostic sample due to size of liver lesions. Advised patient to hold ASA and Plavix for 5 days prior to her biopsy. I will confirm clearance for this with her cardiologist, Dr. Bonner [phone]. The patient voices understanding and all questions have been answered.  The patient agrees to proceed as planned. Patient scheduled for 6/10/2024 with anesthesia secondary to her need to sleep in a sitting position with 2 pillows propped up on her headboard because she has difficulty breathing when lying down. Pre-procedure handout with  clinic phone number provided.

## 2024-05-23 NOTE — Clinical Note
"Thank you for referring Ms. Deal to Interventional Radiology at the [facility]. Please don't hesitate to contact us if there are any questions regarding this evaluation at [phone]. If you have any other patients for whom you would like a consultation, please place an order for "[id number]", and we will be happy to review their case.  Sincerely, BRIANNA Dinero, FNP Interventional Radiology   "

## 2024-05-23 NOTE — LETTER
May 23, 2024        Na Deal  [address] [place] [address]             [facility]  [address]  [place] [address]  Phone: [phone]   Patient: Kylah Deal   MR Number: [id number]   YOB: 1950   Date of Visit: 5/23/2024     Dear Dr. Deal,     {WILDCARD:[id number]}    Sincerely,      Brittanie Garcia, NP            CC  No Recipients    Enclosure

## 2024-05-23 NOTE — PROGRESS NOTES
Patient seen in IR clinic today.  Scheduled for upcoming IR procedure.  Pre-procedure instructions were reviewed with patient.  Patient instructed not to eat or drink anything after midnight the night before procedure.  Pt aware will need someone to provide transport home and monitor pt 8 hours post procedure.  No driving for at least 24 hours after procedure.   Patient advised to  hold Aspirin and Plavix 5 days prior to procedure.  Patient informed Anesthesiology nurse will call 1 or 2 days before procedure with go over instructions with you including what  time to stop eating and drinking and which medications you can take.   Patient verbalized understanding of all pre-procedure instructions.  Written instructions given at appointment today/sent to patient in Mychart/portal.

## 2024-05-23 NOTE — LETTER
May 23, 2024        Geneva Bonner MD  [address] [place] [address] [address]  [place] [address]             [facility]  [address]  [facility] [address]  Phone: [phone]   Patient: Kylah Deal   MR Number: [id number]   YOB: 1950   Date of Visit: 5/23/2024     Dear Dr. Geneva Bonner,     Ms. Deal has been scheduled for a liver mass biopsy on 6/10/2024. Our physicians ask that patients hold their Plavix and aspirin for 5 days prior to a liver mass biopsy.    Ms. Deal is scheduled with anesthesia.    Please provide us with written cardiac clearance for Ms. Deal to hold her aspirin and plavix for 5 days prior to her liver mass biopsy scheduled for 6/10/2024.    PLEASE FAX THIS CLEARANCE to [phone].  Thank you for your help in this matter, and please do not hesitate to call with any questions/concerns you may have.    Sincerely,  BRIANNA Ramirez, MAGNOP  [facility]  Interventional Radiology  Phone [phone]  Fax [phone]

## 2024-05-24 ENCOUNTER — INFUSION (OUTPATIENT)
Dept: INFUSION THERAPY | Facility: HOSPITAL | Age: 74
End: 2024-05-24
Attending: INTERNAL MEDICINE
Payer: MEDICARE

## 2024-05-24 VITALS
TEMPERATURE: 98 F | DIASTOLIC BLOOD PRESSURE: 84 MMHG | SYSTOLIC BLOOD PRESSURE: 183 MMHG | HEART RATE: 65 BPM | RESPIRATION RATE: 16 BRPM

## 2024-05-24 DIAGNOSIS — E83.52 HYPERCALCEMIA: Primary | ICD-10-CM

## 2024-05-24 LAB
CREATININE RANDOM URINE: 54 MG/DL
HBA1C MFR BLD: 6.7 % (ref 4–6)
MICROALBUMIN URINE: 1.2
MICROALBUMIN/CREATININE RATIO: 22 UG/MG

## 2024-05-24 PROCEDURE — 96361 HYDRATE IV INFUSION ADD-ON: CPT

## 2024-05-24 PROCEDURE — 96372 THER/PROPH/DIAG INJ SC/IM: CPT | Mod: 59

## 2024-05-24 PROCEDURE — 25000003 PHARM REV CODE 250: Performed by: INTERNAL MEDICINE

## 2024-05-24 PROCEDURE — 96360 HYDRATION IV INFUSION INIT: CPT

## 2024-05-24 PROCEDURE — 63600175 PHARM REV CODE 636 W HCPCS: Mod: JZ,JG | Performed by: INTERNAL MEDICINE

## 2024-05-24 RX ADMIN — DENOSUMAB 120 MG: 120 INJECTION SUBCUTANEOUS at 11:05

## 2024-05-24 RX ADMIN — SODIUM CHLORIDE 1000 ML: 9 INJECTION, SOLUTION INTRAVENOUS at 11:05

## 2024-05-24 NOTE — PLAN OF CARE
Pt arrived to unit, ambulatory, for injection therapy Xgeva and 1 L NS. Pt alert and oriented, c/o fatigue and SOB on exertion and sometimes just with talking but denies oxygen use at home. 1 L NS administered and infused over 1 hour. Xgeva administered subQ without incident. Pt discharged home upon completion in NAD.

## 2024-05-30 ENCOUNTER — PATIENT MESSAGE (OUTPATIENT)
Dept: PULMONOLOGY | Facility: CLINIC | Age: 74
End: 2024-05-30
Payer: MEDICARE

## 2024-05-30 DIAGNOSIS — D86.0 SARCOIDOSIS OF LUNG: Primary | ICD-10-CM

## 2024-05-31 ENCOUNTER — TELEPHONE (OUTPATIENT)
Dept: INTERVENTIONAL RADIOLOGY/VASCULAR | Facility: HOSPITAL | Age: 74
End: 2024-05-31
Payer: MEDICARE

## 2024-05-31 RX ORDER — SULFAMETHOXAZOLE AND TRIMETHOPRIM 800; 160 MG/1; MG/1
1 TABLET ORAL
Qty: 24 TABLET | Refills: 0 | Status: SHIPPED | OUTPATIENT
Start: 2024-05-31 | End: 2024-07-30

## 2024-05-31 RX ORDER — PREDNISONE 10 MG/1
TABLET ORAL
Qty: 120 TABLET | Refills: 0 | Status: SHIPPED | OUTPATIENT
Start: 2024-05-31 | End: 2024-08-04

## 2024-05-31 NOTE — TELEPHONE ENCOUNTER
Called Dr. Bonner's office and spoke to Dodie to check on the status of clearance for Ms. Deal to hold ASA and plavix prior to biopsy. Dodie confirms they have received our request. She has sent a message to Dr. Bonner's nurse. I provided my cell phone number.

## 2024-06-04 ENCOUNTER — DOCUMENTATION ONLY (OUTPATIENT)
Dept: PREADMISSION TESTING | Facility: HOSPITAL | Age: 74
End: 2024-06-04
Payer: MEDICARE

## 2024-06-13 ENCOUNTER — TELEPHONE (OUTPATIENT)
Dept: PULMONOLOGY | Facility: CLINIC | Age: 74
End: 2024-06-13

## 2024-06-13 ENCOUNTER — OFFICE VISIT (OUTPATIENT)
Dept: PULMONOLOGY | Facility: CLINIC | Age: 74
End: 2024-06-13
Payer: MEDICARE

## 2024-06-13 ENCOUNTER — LAB VISIT (OUTPATIENT)
Dept: LAB | Facility: HOSPITAL | Age: 74
End: 2024-06-13
Attending: INTERNAL MEDICINE
Payer: MEDICARE

## 2024-06-13 ENCOUNTER — HOSPITAL ENCOUNTER (OUTPATIENT)
Dept: PULMONOLOGY | Facility: CLINIC | Age: 74
Discharge: HOME | End: 2024-06-13
Payer: MEDICARE

## 2024-06-13 VITALS
HEART RATE: 69 BPM | DIASTOLIC BLOOD PRESSURE: 86 MMHG | WEIGHT: 99.63 LBS | OXYGEN SATURATION: 98 % | BODY MASS INDEX: 18.81 KG/M2 | HEIGHT: 61 IN | SYSTOLIC BLOOD PRESSURE: 166 MMHG

## 2024-06-13 DIAGNOSIS — D86.0 SARCOIDOSIS OF LUNG: Primary | ICD-10-CM

## 2024-06-13 DIAGNOSIS — N17.9 ACUTE RENAL FAILURE, UNSPECIFIED ACUTE RENAL FAILURE TYPE: ICD-10-CM

## 2024-06-13 DIAGNOSIS — B37.0 THRUSH, ORAL: ICD-10-CM

## 2024-06-13 DIAGNOSIS — D86.0 SARCOIDOSIS OF LUNG: ICD-10-CM

## 2024-06-13 DIAGNOSIS — E83.52 HYPERCALCEMIA DUE TO GRANULOMATOUS DISEASE: ICD-10-CM

## 2024-06-13 DIAGNOSIS — D71 HYPERCALCEMIA DUE TO GRANULOMATOUS DISEASE: ICD-10-CM

## 2024-06-13 LAB
ALBUMIN SERPL BCP-MCNC: 3.5 G/DL (ref 3.5–5.2)
ALP SERPL-CCNC: 66 U/L (ref 55–135)
ALT SERPL W/O P-5'-P-CCNC: 29 U/L (ref 10–44)
ANION GAP SERPL CALC-SCNC: 10 MMOL/L (ref 8–16)
AST SERPL-CCNC: 33 U/L (ref 10–40)
BILIRUB SERPL-MCNC: 0.4 MG/DL (ref 0.1–1)
BUN SERPL-MCNC: 42 MG/DL (ref 8–23)
CALCIUM SERPL-MCNC: 12.8 MG/DL (ref 8.7–10.5)
CHLORIDE SERPL-SCNC: 100 MMOL/L (ref 95–110)
CO2 SERPL-SCNC: 24 MMOL/L (ref 23–29)
CREAT SERPL-MCNC: 1.6 MG/DL (ref 0.5–1.4)
DLCO ADJ PRE: 7.99 ML/(MIN*MMHG) (ref 13.58–25.04)
DLCO SINGLE BREATH LLN: 13.58
DLCO SINGLE BREATH PRE REF: 38.1 %
DLCO SINGLE BREATH REF: 19.31
DLCOC SBVA LLN: 2.66
DLCOC SBVA PRE REF: 61.3 %
DLCOC SBVA REF: 4.19
DLCOC SINGLE BREATH LLN: 13.58
DLCOC SINGLE BREATH PRE REF: 41.4 %
DLCOC SINGLE BREATH REF: 19.31
DLCOCSBVAULN: 5.73
DLCOCSINGLEBREATHULN: 25.04
DLCOSINGLEBREATHULN: 25.04
DLCOVA LLN: 2.66
DLCOVA PRE REF: 56.4 %
DLCOVA PRE: 2.37 ML/(MIN*MMHG*L) (ref 2.66–5.73)
DLCOVA REF: 4.19
DLCOVAULN: 5.73
DLVAADJ PRE: 2.57 ML/(MIN*MMHG*L) (ref 2.66–5.73)
ERVN2 LLN: -16449.45
ERVN2 PRE REF: 149.4 %
ERVN2 PRE: 0.83 L (ref -16449.45–16450.55)
ERVN2 REF: 0.55
ERVN2ULN: ABNORMAL
EST. GFR  (NO RACE VARIABLE): 33.4 ML/MIN/1.73 M^2
FEF 25 75 LLN: 0.7
FEF 25 75 PRE REF: 59.2 %
FEF 25 75 REF: 1.6
FET100 CHG: 12 %
FEV05 LLN: 0.68
FEV05 REF: 1.54
FEV1 CHG: -2.3 %
FEV1 FVC LLN: 64
FEV1 FVC PRE REF: 89.6 %
FEV1 FVC REF: 78
FEV1 LLN: 1.36
FEV1 PRE REF: 82 %
FEV1 REF: 1.91
FEV1 VOL CHG: -0.04
FRCN2 LLN: 1.78
FRCN2 PRE REF: 73.9 %
FRCN2 REF: 2.6
FRCN2ULN: 3.43
FVC CHG: -3.4 %
FVC LLN: 1.77
FVC PRE REF: 90.6 %
FVC REF: 2.48
FVC VOL CHG: -0.08
GLUCOSE SERPL-MCNC: 121 MG/DL (ref 70–110)
ICN2REF: 1.71
IVC PRE: 2.07 L (ref 1.77–3.23)
IVC SINGLE BREATH LLN: 1.77
IVC SINGLE BREATH PRE REF: 83.5 %
IVC SINGLE BREATH REF: 2.48
IVCSINGLEBREATHULN: 3.23
LLN IC N2: -16448.29
PEF LLN: 3.34
PEF PRE REF: 81.9 %
PEF REF: 4.94
PHYSICIAN COMMENT: ABNORMAL
POST FEF 25 75: 0.98 L/S (ref 0.7–2.93)
POST FET 100: 6.88 SEC
POST FEV1 FVC: 70.46 % (ref 63.59–89.92)
POST FEV1: 1.53 L (ref 1.36–2.44)
POST FEV5: 1.2 L (ref 0.68–2.39)
POST FVC: 2.17 L (ref 1.77–3.23)
POST PEF: 3.39 L/S (ref 3.34–6.55)
POTASSIUM SERPL-SCNC: 3.9 MMOL/L (ref 3.5–5.1)
PRE DLCO: 7.36 ML/(MIN*MMHG) (ref 13.58–25.04)
PRE FEF 25 75: 0.95 L/S (ref 0.7–2.93)
PRE FET 100: 6.15 SEC
PRE FEV05 REF: 80.6 %
PRE FEV1 FVC: 69.67 % (ref 63.59–89.92)
PRE FEV1: 1.57 L (ref 1.36–2.44)
PRE FEV5: 1.24 L (ref 0.68–2.39)
PRE FRC N2: 1.92 L (ref 1.78–3.43)
PRE FVC: 2.25 L (ref 1.77–3.23)
PRE IC N2: 1.42 L (ref -16448.29–16451.71)
PRE PEF: 4.05 L/S (ref 3.34–6.55)
PRE REF IC N2: 83.3 %
PROT SERPL-MCNC: 7.5 G/DL (ref 6–8.4)
RVN2 LLN: 1.47
RVN2 PRE REF: 53.5 %
RVN2 PRE: 1.1 L (ref 1.47–2.63)
RVN2 REF: 2.05
RVN2TLCN2 LLN: 34.87
RVN2TLCN2 PRE REF: 73.8 %
RVN2TLCN2 PRE: 32.8 % (ref 34.87–54.05)
RVN2TLCN2 REF: 44.46
RVN2TLCN2ULN: 54.05
RVN2ULN: 2.63
SODIUM SERPL-SCNC: 134 MMOL/L (ref 136–145)
TLCN2 LLN: 3.62
TLCN2 PRE REF: 72.6 %
TLCN2 PRE: 3.34 L (ref 3.62–5.59)
TLCN2 REF: 4.6
TLCN2ULN: 5.59
ULN IC N2: ABNORMAL
VA PRE: 3.11 L (ref 4.45–4.45)
VA SINGLE BREATH LLN: 4.45
VA SINGLE BREATH PRE REF: 69.8 %
VA SINGLE BREATH REF: 4.45
VASINGLEBREATHULN: 4.45
VCMAXN2 LLN: 1.77
VCMAXN2 PRE REF: 90.6 %
VCMAXN2 PRE: 2.25 L (ref 1.77–3.23)
VCMAXN2 REF: 2.48
VCMAXN2ULN: 3.23

## 2024-06-13 PROCEDURE — 94060 EVALUATION OF WHEEZING: CPT | Mod: HCNC,S$GLB,, | Performed by: INTERNAL MEDICINE

## 2024-06-13 PROCEDURE — 80053 COMPREHEN METABOLIC PANEL: CPT | Mod: HCNC | Performed by: INTERNAL MEDICINE

## 2024-06-13 PROCEDURE — 3079F DIAST BP 80-89 MM HG: CPT | Mod: HCNC,CPTII,S$GLB, | Performed by: INTERNAL MEDICINE

## 2024-06-13 PROCEDURE — 3288F FALL RISK ASSESSMENT DOCD: CPT | Mod: HCNC,CPTII,S$GLB, | Performed by: INTERNAL MEDICINE

## 2024-06-13 PROCEDURE — 82164 ANGIOTENSIN I ENZYME TEST: CPT | Mod: HCNC | Performed by: INTERNAL MEDICINE

## 2024-06-13 PROCEDURE — 99215 OFFICE O/P EST HI 40 MIN: CPT | Mod: HCNC,25,S$GLB, | Performed by: INTERNAL MEDICINE

## 2024-06-13 PROCEDURE — 94729 DIFFUSING CAPACITY: CPT | Mod: HCNC,S$GLB,, | Performed by: INTERNAL MEDICINE

## 2024-06-13 PROCEDURE — 99999 PR PBB SHADOW E&M-EST. PATIENT-LVL V: CPT | Mod: PBBFAC,HCNC,, | Performed by: INTERNAL MEDICINE

## 2024-06-13 PROCEDURE — 86480 TB TEST CELL IMMUN MEASURE: CPT | Mod: HCNC | Performed by: INTERNAL MEDICINE

## 2024-06-13 PROCEDURE — 94727 GAS DIL/WSHOT DETER LNG VOL: CPT | Mod: HCNC,S$GLB,, | Performed by: INTERNAL MEDICINE

## 2024-06-13 PROCEDURE — 1160F RVW MEDS BY RX/DR IN RCRD: CPT | Mod: HCNC,CPTII,S$GLB, | Performed by: INTERNAL MEDICINE

## 2024-06-13 PROCEDURE — 3077F SYST BP >= 140 MM HG: CPT | Mod: HCNC,CPTII,S$GLB, | Performed by: INTERNAL MEDICINE

## 2024-06-13 PROCEDURE — 1159F MED LIST DOCD IN RCRD: CPT | Mod: HCNC,CPTII,S$GLB, | Performed by: INTERNAL MEDICINE

## 2024-06-13 PROCEDURE — 1101F PT FALLS ASSESS-DOCD LE1/YR: CPT | Mod: HCNC,CPTII,S$GLB, | Performed by: INTERNAL MEDICINE

## 2024-06-13 RX ORDER — NYSTATIN 100000 [USP'U]/ML
6 SUSPENSION ORAL 4 TIMES DAILY
Qty: 240 ML | Refills: 0 | Status: SHIPPED | OUTPATIENT
Start: 2024-06-13 | End: 2024-06-23

## 2024-06-13 RX ORDER — FOLIC ACID 1 MG/1
1 TABLET ORAL DAILY
Qty: 360 TABLET | Refills: 0 | Status: SHIPPED | OUTPATIENT
Start: 2024-06-13 | End: 2025-06-13

## 2024-06-13 RX ORDER — FLUCONAZOLE 150 MG/1
150 TABLET ORAL DAILY
Qty: 1 TABLET | Refills: 0 | Status: SHIPPED | OUTPATIENT
Start: 2024-06-13 | End: 2024-06-14

## 2024-06-13 RX ORDER — METHOTREXATE 2.5 MG/1
TABLET ORAL
Qty: 216 TABLET | Refills: 0 | Status: SHIPPED | OUTPATIENT
Start: 2024-06-13 | End: 2024-06-16 | Stop reason: SDUPTHER

## 2024-06-13 NOTE — ASSESSMENT & PLAN NOTE
Elevated creatinine is likely 2/2 Bactrim.  Encouraged oral hydration.  Discontinuing Bactrim.   Repeat BMP in 4 weeks.

## 2024-06-13 NOTE — TELEPHONE ENCOUNTER
----- Message from Lizzie LASHAY Henderson sent at 6/13/2024  2:47 PM CDT -----  This is Lizzie located at [facility]. Please call at [phone] concernng a critical result,

## 2024-06-13 NOTE — TELEPHONE ENCOUNTER
----- Message from Ivelisse Briggs MD sent at 6/13/2024  3:16 PM CDT -----  Could you please call the patient and relay the following:     Lung function tests look much better.   You may discontinue the Trelegy all together.   If you notice any changes in your breathing after discontinuation of the medication please contact the office.

## 2024-06-13 NOTE — ASSESSMENT & PLAN NOTE
Symptoms improved dramatically with the addition of prednisone. Because her diabetes has been difficult to manage on the prednisone, I will transition her to methotrexate.     Start MTX 2.5mg weekly for two weeks, then increase to 5mg weekly for two weeks, then 7.5mg weekly for two weeks, and then 10mg weekly going forward.   While your taking MTX, you need to take folic acid 1mg daily.  Once you start the MTX, wean off prednisone as follows:   Prednisone 15mg for 5 days, then drop to 10mg for 5 days, then drop to 5mg for 5 days, then stop all together.  If you notice your respiratory status begins to deteriorate, you must call the clinic immediately so that we can adjust your steroid dose.   Discontinue Bactrim as soon as you reduce your steroid dose to less than 20mg.     Discontinue Trelegy.

## 2024-06-13 NOTE — PATIENT INSTRUCTIONS
Start MTX 2.5mg weekly for two weeks, then increase to 5mg weekly for two weeks, then 7.5mg weekly for two weeks, and then 10mg weekly going forward.   While your taking MTX, you need to take folic acid 1mg daily.  Once you start the MTX, wean off prednisone as follows:   Prednisone 15mg for 5 days, then drop to 10mg for 5 days, then drop to 5mg for 5 days, then stop all together.  If you notice your respiratory status begins to deteriorate, you must call the clinic immediately so that we can adjust your steroid dose.   You may discontinue Bactrim as soon as you reduce your steroid dose to less than 20mg.

## 2024-06-13 NOTE — PROGRESS NOTES
Subjective:       Patient ID: Kylah Deal is a 73 y.o. female.    Chief Complaint: Follow-up    HPI:   Kylah Deal is a 73 y.o. female who presents for follow up of probable sarcoidosis.  Last seen:24  Started on prednisone on May 30, 2024.  States she is feeling much better.  She no longer has cough or shortness of breath.  Her weight has stabilized and her fatigue has resolved.     She reports oral thrush today.   Currently on 20mg of prednisone.  Energy is back   Still having night sweats on occasion.  Blood glucose is uncontrolled and she is having difficulty adjusting her insulin to keep up with the numbers.      24_____________________________________  Last seen on 2024.  Since that time she has had an EUS of a anand-hilar mass. Pathology with rare granulomas.  BM biopsy is scheduled for 24.    Her symptoms are essentially unchanged.  She still has significant MAHONEY, night sweats, and fatigue.    __________________________________________  Noted to have hypercalcemia on routine labs.   This led to imaging.  There was concern for sarcoidosis.  BAL and TBBX were negative for granulomatous disease (which seems unusual given the degree of lung involvement.)   A follow up biopsy of a LN by IR resulted in non-caseating granulomas, however the sample was quite small and the physician did not feel that he had adequately biopsied the area.  The patient is understandably frustrated by the lack of definitive diagnosis at this juncture.     Additional pertinent history:   She reports that nothing tastes right  Reports significant sinus symptoms  Shortness of breath with minimal exertion.  Persistent dry and irritating cough.  She also reports significant weight loss and loss of appetite.    Former smoker. Stopped in .  1/2ppw age [age]   No known occupational exposures.  Father was a   Dad was a heavy smoker- 3ppd;  of lung cancer  Mother with COPD    Review of Systems    Constitutional:  Negative for weight loss and activity change.   HENT:  Negative for postnasal drip and congestion.         Dry mouth   Respiratory:  Positive for orthopnea (improved.). Negative for cough (for at least a year), sputum production (in the morning) and shortness of breath.          Social History     Tobacco Use    Smoking status: Former     Current packs/day: 0.00     Types: Cigarettes     Quit date:      Years since quittin.4    Smokeless tobacco: Never   Substance Use Topics    Alcohol use: Yes     Alcohol/week: 1.0 standard drink of alcohol     Types: 1 Glasses of wine per week       Review of patient's allergies indicates:   Allergen Reactions    Atorvastatin calcium Other (See Comments)     Pain    Metformin Diarrhea     Diarrhea and cramping    Rosuvastatin Other (See Comments)     pain    Statins-hmg-coa reductase inhibitors      Past Medical History:   Diagnosis Date    CAD (coronary artery disease)     Diabetes mellitus     Hypertension     Migraine     Seizures      Past Surgical History:   Procedure Laterality Date    APPENDECTOMY      CHOLECYSTECTOMY      CORONARY ANGIOPLASTY WITH STENT PLACEMENT      CORONARY ARTERY BYPASS GRAFT      ENDOSCOPIC ULTRASOUND OF UPPER GASTROINTESTINAL TRACT N/A 2024    Procedure: ULTRASOUND, UPPER GI TRACT, ENDOSCOPIC;  Surgeon: Lavell Lomax MD;  Location: [facility];  Service: Endoscopy;  Laterality: N/A;   portal- EUS-guided biopsy of anastacia hepatis mass.    HEEL SPUR EXCISION      HIP FRACTURE SURGERY Left 2018    HYSTERECTOMY      TONSILLECTOMY      URETHRAL SLING      WRIST SURGERY Right      Current Outpatient Medications on File Prior to Visit   Medication Sig    albuterol (PROVENTIL/VENTOLIN HFA) 90 mcg/actuation inhaler Inhale 2 puffs into the lungs every 4 (four) hours as needed.    aspirin (ECOTRIN) 81 MG EC tablet Take 81 mg by mouth once daily.    carvediloL (COREG) 12.5 MG tablet Take 1 tablet by mouth 2  (two) times daily with meals.    cholecalciferol, vitamin D3, (VITAMIN D3) 50 mcg (2,000 unit) Cap capsule Take 1 tablet by mouth.    clopidogreL (PLAVIX) 75 mg tablet Take 1 tablet by mouth once daily.    denosumab (PROLIA) 60 mg/mL Syrg Inject 60 mg into the skin every 6 (six) months.    droNABinol (MARINOL) 2.5 MG capsule Take 2.5 mg by mouth 2 (two) times daily before meals.    evolocumab (REPATHA SURECLICK SUBQ) Inject into the skin.    ezetimibe (ZETIA) 10 mg tablet Take 10 mg by mouth once daily.    fluticasone (VERAMYST) 27.5 mcg/actuation nasal spray 2 sprays by Nasal route once daily.    furosemide (LASIX) 20 MG tablet Take 20 mg by mouth 2 (two) times daily.    guaiFENesin-codeine 100-10 mg/5 ml (TUSSI-ORGANIDIN NR)  mg/5 mL syrup Take 10 mLs by mouth every 4 (four) hours as needed.    hydrALAZINE (APRESOLINE) 50 MG tablet Take 1 tablet by mouth 2 (two) times daily.    insulin aspart U-100 (NOVOLOG) 100 unit/mL (3 mL) InPn pen Inject 2 Units into the skin as needed.    levETIRAcetam (KEPPRA) 500 MG Tab     linaGLIPtin (TRADJENTA) 5 mg Tab tablet Take 5 mg by mouth once daily.    lisinopriL (PRINIVIL,ZESTRIL) 40 MG tablet Take 1 tablet by mouth once daily.    montelukast (SINGULAIR) 10 mg tablet Take 10 mg by mouth.    nitroGLYCERIN (NITROSTAT) 0.4 MG SL tablet Place 0.4 mg under the tongue.    pantoprazole (PROTONIX) 40 MG tablet Take 40 mg by mouth every morning.    predniSONE (DELTASONE) 10 MG tablet Take 3 tablets (30 mg total) by mouth once daily for 5 days, THEN 2 tablets (20 mg total) once daily for 30 days, THEN 1.5 tablets (15 mg total) once daily.    solifenacin (VESICARE) 10 MG tablet Take 1 tablet by mouth once daily.    sulfamethoxazole-trimethoprim 800-160mg (BACTRIM DS) 800-160 mg Tab Take 1 tablet by mouth every Mon, Wed, Fri.    TRELEGY ELLIPTA 200-62.5-25 mcg inhaler Inhale 1 puff into the lungs.    mirtazapine (REMERON) 7.5 MG Tab Take 1 tablet (7.5 mg total) by mouth every  "evening. (Patient not taking: Reported on 5/23/2024)     No current facility-administered medications on file prior to visit.       Objective:      Vitals:    06/13/24 1056   BP: (!) 166/86   BP Location: Right arm   Patient Position: Sitting   Pulse: 69   SpO2: 98%   Weight: 45.2 kg (99 lb 10.4 oz)   Height: 5' 1" (1.549 m)       Physical Exam   Constitutional: She is oriented to person, place, and time. She appears well-developed and well-nourished.   HENT:   Mouth/Throat: Mallampati Score: III.   Cardiovascular: Normal rate and regular rhythm.   Pulmonary/Chest: Normal expansion and breath sounds normal. She has no wheezes. She has no rhonchi.   Musculoskeletal:         General: No edema.   Lymphadenopathy: No supraclavicular adenopathy is present.     She has no cervical adenopathy.     She has no axillary adenopathy.   Neurological: She is alert and oriented to person, place, and time.   Skin: Skin is warm and dry.   Psychiatric: She has a normal mood and affect. Her behavior is normal. Judgment and thought content normal.     Personal Diagnostic Review        6/13/2024    10:56 AM 5/23/2024     8:25 AM 4/29/2024     4:22 PM 4/29/2024     3:20 PM 4/24/2024     1:42 PM 4/12/2024     8:58 AM 4/3/2024    12:14 PM   Pulmonary Function Tests   SpO2 98 %  97 % 99 % 98 % 98 % 100 %   Height 5' 1" (1.549 m) 5' 1" (1.549 m)   5' 1" (1.549 m) 5' 1" (1.549 m)    Weight 45.2 kg (99 lb 10.4 oz) 45.4 kg (100 lb 1.4 oz)   45.4 kg (100 lb 1.4 oz) 45.6 kg (100 lb 8.5 oz)    BMI (Calculated) 18.8 18.9   18.9 19          CT Chest Abdomen Pelvis With IV Contrast (XPD) Routine Oral Contrast  Narrative: EXAMINATION:  CT CHEST ABDOMEN PELVIS WITH IV CONTRAST (XPD)    CLINICAL HISTORY:  Metastatic disease evaluation;pt w/ sacoid and possible lymphoma, please compare this scan to all prior scans in our system to assess for interval change;Disorder of bone, unspecified    TECHNIQUE:  Low dose axial images, sagittal and coronal " reformations were obtained from the thoracic inlet to the pubic symphysis after IV administration of 75 cc of Omnipaque. Oral contrast was also administered.    COMPARISON:  01/13/2024    FINDINGS:  Chest:    Heart and mediastinal vasculature: Severe aortic and coronary atherosclerosis.    Isamar/Mediastinum: Diffuse soft tissue thickening surrounding the pulmonary arteries and throughout the mediastinum and hilar regions appears similar to prior.  Punctate foci of hyperdensity at the subcarinal region and right hilum suggest partial calcification.  Findings are similar to prior.    Lungs: Near complete right middle lobe atelectasis is redemonstrated and extensive Svetlana lymphatic nodular and confluent lung opacities mostly in the perihilar regions but present throughout the lungs appear grossly similar to the prior exam.  Stable retrocrural and left-sided pleural thickening.    No axillary adenopathy or supraclavicular adenopathy.    Abdomen and pelvis:    Liver: Innumerable subcentimeter hepatic hypodensities are similar to prior and not able to be further characterized.    Gallbladder: Absent    Bile Ducts: stable mild intra hepatic biliary ductal dilatation with the common duct tapering to a normal caliber at the anastacia hepatis.    Pancreas: No mass or peripancreatic fat stranding.    Spleen: Unremarkable.    Adrenals: Stable 1.5 cm left adrenal gland myelolipoma.  Right adrenal unremarkable.    Kidneys/ Ureters: Normal in size and location. Right sided lower pole early staghorn calculi measuring approximately 10 x 10 mm similar to prior.  Extrarenal pelvis and mild proximal hydroureter redemonstrated.  Bilateral cortical scarring versus fetal lobulation.  Mild left-sided urothelial wall thickening proximally.    GI Tract/Mesentery: No evidence of bowel obstruction or inflammation. Increased numbers of normal-sized lymph nodes in the small bowel mesentery.    Peritoneal Space: No ascites. No free  air.    Retroperitoneum: Retrocrural and bilateral upper abdominal para-aortic adenopathy appears similar to the prior exam with adenopathy extending into the anastacia hepatis.    Vasculature: Severe atherosclerosis of the abdominal aorta and its branches.    Bladder: No evidence of wall thickening.    Reproductive organs: Hysterectomy suspected.    Bones: No suspicious lytic or blastic lesions.  Impression: Stable appearance of the chest abdomen pelvis with mediastinal, retrocrural, and retroperitoneal confluent adenopathy and extensive Svetlana lymphatic nodular and confluent bilateral lung opacities.  Findings that can be seen in patient's provided diagnosis of sarcoidosis and correlation with tissue diagnosis, if not already performed is suggested.  Lymphoproliferative disorder could conceivably have a similar appearance but thought less likely    It is difficult to measure discrete lesions according to recist criteria.    Stable mild intrahepatic biliary dilatation    Severe atherosclerosis    Right nephrolithiasis, proximal right hydroureter and bilateral urothelial wall thickening suggesting chronic inflammation.    Lesion 1: organ (max 2 lesions per organ). current measurement - long axis for mass (minimum baseline 1 cm), short axis for node (minimum baseline 1.5) cm. Series  Image . Prior measurement  cm.    Lesion 2: organ (max 2 lesions per organ). current measurement - long axis for mass (minimum baseline 1 cm), short axis for node (minimum baseline 1.5) cm. Series  Image . Prior measurement  cm.    Lesion 3: organ (max 2 lesions per organ). current measurement - long axis for mass (minimum baseline 1 cm), short axis for node (minimum baseline 1.5) cm. Series  Image . Prior measurement  cm.    Lesion 4: organ (max 2 lesions per organ). current measurement - long axis for mass (minimum baseline 1 cm), short axis for node (minimum baseline 1.5) cm. Series  Image . Prior measurement  cm.    Lesion 5: organ (max 2  lesions per organ). current measurement - long axis for mass (minimum baseline 1 cm), short axis for node (minimum baseline 1.5) cm. Series  Image . Prior measurement  cm.    Electronically signed by: Jase Villa Jr  Date:    05/10/2024  Time:    14:50  PFTs: 2/8/24: moderate obstruction, moderate restriction, severely reduced DLCO.  PFTs: 6/13/24: no obstruction, mild restriction, severely reduced DLCO    Assessment:     Orders Placed This Encounter   Procedures    QUANTIFERON GOLD TB     Standing Status:   Future     Number of Occurrences:   1     Standing Expiration Date:   9/11/2025    Comprehensive Metabolic Panel     Standing Status:   Future     Number of Occurrences:   1     Standing Expiration Date:   9/11/2025    ANGIOTENSIN CONVERTING ENZYME     Standing Status:   Future     Number of Occurrences:   1     Standing Expiration Date:   9/11/2025    DLCO-Carbon Monoxide Diffusing Capacity     Standing Status:   Future     Number of Occurrences:   1     Standing Expiration Date:   6/13/2025     Order Specific Question:   Release to patient     Answer:   Immediate    Lung Volumes     Standing Status:   Future     Number of Occurrences:   1     Standing Expiration Date:   6/13/2025     Order Specific Question:   Release to patient     Answer:   Immediate    Spirometry with/without bronchodilator     Standing Status:   Future     Number of Occurrences:   1     Standing Expiration Date:   6/13/2025     Order Specific Question:   Release to patient     Answer:   Immediate     1. Sarcoidosis of lung    2. Thrush, oral    3. Hypercalcemia due to granulomatous disease    4. Acute renal failure, unspecified acute renal failure type            Plan:         Problem List Items Addressed This Visit          Pulmonary    Sarcoidosis of lung - Primary    Current Assessment & Plan     Symptoms improved dramatically with the addition of prednisone. Because her diabetes has been difficult to manage on the prednisone, I  will transition her to methotrexate.     Start MTX 2.5mg weekly for two weeks, then increase to 5mg weekly for two weeks, then 7.5mg weekly for two weeks, and then 10mg weekly going forward.   While your taking MTX, you need to take folic acid 1mg daily.  Once you start the MTX, wean off prednisone as follows:   Prednisone 15mg for 5 days, then drop to 10mg for 5 days, then drop to 5mg for 5 days, then stop all together.  If you notice your respiratory status begins to deteriorate, you must call the clinic immediately so that we can adjust your steroid dose.   Discontinue Bactrim as soon as you reduce your steroid dose to less than 20mg.     Discontinue Trelegy.         Relevant Medications    methotrexate 2.5 MG Tab    Other Relevant Orders    DLCO-Carbon Monoxide Diffusing Capacity    Lung Volumes    Spirometry with/without bronchodilator (Completed)    QUANTIFERON GOLD TB    Comprehensive Metabolic Panel (Completed)    ANGIOTENSIN CONVERTING ENZYME    Comprehensive Metabolic Panel       Renal/    Hypercalcemia due to granulomatous disease    Current Assessment & Plan     Continue Prolia         Relevant Orders    Comprehensive Metabolic Panel    Acute renal failure    Current Assessment & Plan     Elevated creatinine is likely 2/2 Bactrim.  Encouraged oral hydration.  Discontinuing Bactrim.   Repeat BMP in 4 weeks.            ID    Thrush, oral    Current Assessment & Plan     Likely secondary to ICS.  Plan for fluconazole plus nystatin swish and spit.  Discontinue Trelegy.         Relevant Medications    nystatin (MYCOSTATIN) 100,000 unit/mL suspension    fluconazole (DIFLUCAN) 150 MG Tab             55 minutes of total time spent on the encounter, which includes face to face time and non-face to face time preparing to see the patient (eg, review of tests), Obtaining and/or reviewing separately obtained history, Documenting clinical information in the electronic or other health record, Independently  interpreting results (not separately reported) and communicating results to the patient/family/caregiver, or Care coordination (not separately reported).

## 2024-06-13 NOTE — ASSESSMENT & PLAN NOTE
Likely secondary to ICS.  Plan for fluconazole plus nystatin swish and spit.  Discontinue Trelegy.

## 2024-06-13 NOTE — TELEPHONE ENCOUNTER
----- Message from Margret Nomi Route sent at 6/13/2024  2:46 PM CDT -----  Regarding: Critical Lab  Contact: Lizzie Ext.  [phone]  Lizzie with the Chemistry Lab calling about a critical lab for the pt. Best Call Back Number   Lizzie Ext.  [phone]

## 2024-06-13 NOTE — TELEPHONE ENCOUNTER
I spoke with Ms Deal in regards to her Trelegy. I informed patient that she may stop taking her Trelegy per Dr Briggs. I advised patient if she notice any changes in her breathing after discontinuation of the medication to please contact the office. Patient verbalized understanding.    Patient is scheduled for repeat labs on 7/12/24 at 10:30 AM. Patient confirmed.

## 2024-06-14 ENCOUNTER — TELEPHONE (OUTPATIENT)
Dept: PULMONOLOGY | Facility: CLINIC | Age: 74
End: 2024-06-14
Payer: MEDICARE

## 2024-06-14 DIAGNOSIS — D86.0 SARCOIDOSIS OF LUNG: ICD-10-CM

## 2024-06-14 LAB
ACE SERPL-CCNC: <5 U/L (ref 16–85)
GAMMA INTERFERON BACKGROUND BLD IA-ACNC: 0.1 IU/ML
M TB IFN-G CD4+ BCKGRND COR BLD-ACNC: 0.08 IU/ML
M TB IFN-G CD4+ BCKGRND COR BLD-ACNC: 0.09 IU/ML
MITOGEN IGNF BCKGRD COR BLD-ACNC: 9.9 IU/ML
TB GOLD PLUS: NEGATIVE

## 2024-06-14 NOTE — TELEPHONE ENCOUNTER
----- Message from Margret AMNDI Route sent at 6/14/2024 12:12 PM CDT -----  Regarding: Quantity  Contact: pt  Pt is calling in ref to medication  methotrexate 2.5 MG Tab. Pt says pharmacy has a question about the quantity.  Asking to speak with Yesi. Patient Requesting Call Back @ [phone]

## 2024-06-14 NOTE — TELEPHONE ENCOUNTER
----- Message from Margret RAYMOND Route sent at 6/14/2024  9:16 AM CDT -----  Regarding: Clarification  Contact: r:Yudelka [phone]  Yudelka with [facility] is calling in ref to receiving clarification on   medication methotrexate 2.5 MG Tab. Quantity does not match directions. Needs clarification.   Best Call Back Number:Yudelka [phone]  fax [phone]

## 2024-06-14 NOTE — TELEPHONE ENCOUNTER
Call was returned to Ms. Yudelka with [facility] in regards to patient methotrexate 2.5 MG Tab. MsJoanna Yudelka states the quantity of 216 does not match directions. I informed her that I will send Dr Briggs a message to clarified the doses. She verbalized understanding.

## 2024-06-14 NOTE — TELEPHONE ENCOUNTER
Call was returned to patient in regards to her Rx. I informed patient that I sent Dr Briggs a message this morning to review and advise. Patient verbalized understanding.

## 2024-06-16 RX ORDER — METHOTREXATE 2.5 MG/1
TABLET ORAL
Qty: 18 TABLET | Refills: 0 | Status: SHIPPED | OUTPATIENT
Start: 2024-06-16 | End: 2024-10-26

## 2024-06-20 ENCOUNTER — PATIENT OUTREACH (OUTPATIENT)
Dept: ADMINISTRATIVE | Facility: HOSPITAL | Age: 74
End: 2024-06-20
Payer: MEDICARE

## 2024-06-20 NOTE — PROGRESS NOTES
Outreached provider to document statin intolerance/allergy. Labs up dated in HM. Immunization's updated/triggered.

## 2024-06-28 ENCOUNTER — TELEPHONE (OUTPATIENT)
Dept: FAMILY MEDICINE | Facility: CLINIC | Age: 74
End: 2024-06-28
Payer: MEDICARE

## 2024-06-28 DIAGNOSIS — G40.909 SEIZURE DISORDER: Primary | ICD-10-CM

## 2024-06-28 RX ORDER — LEVETIRACETAM 500 MG/1
500 TABLET ORAL 2 TIMES DAILY
Qty: 60 TABLET | Refills: 0 | Status: SHIPPED | OUTPATIENT
Start: 2024-06-28 | End: 2025-06-28

## 2024-06-28 NOTE — TELEPHONE ENCOUNTER
Pt called an stated she Need refill for levetiracetam.  First neurologist appointment available isnt until 15 days after I run out

## 2024-07-08 ENCOUNTER — LAB VISIT (OUTPATIENT)
Dept: LAB | Facility: HOSPITAL | Age: 74
End: 2024-07-08
Attending: INTERNAL MEDICINE
Payer: MEDICARE

## 2024-07-08 ENCOUNTER — TELEPHONE (OUTPATIENT)
Dept: PULMONOLOGY | Facility: CLINIC | Age: 74
End: 2024-07-08
Payer: MEDICARE

## 2024-07-08 DIAGNOSIS — D71 HYPERCALCEMIA DUE TO GRANULOMATOUS DISEASE: ICD-10-CM

## 2024-07-08 DIAGNOSIS — E83.52 HYPERCALCEMIA DUE TO GRANULOMATOUS DISEASE: ICD-10-CM

## 2024-07-08 LAB
ALBUMIN SERPL BCP-MCNC: 2.9 G/DL (ref 3.5–5.2)
ALP SERPL-CCNC: 69 U/L (ref 55–135)
ALT SERPL W/O P-5'-P-CCNC: 17 U/L (ref 10–44)
ANION GAP SERPL CALC-SCNC: 8 MMOL/L (ref 8–16)
AST SERPL-CCNC: 17 U/L (ref 10–40)
BILIRUB SERPL-MCNC: 0.3 MG/DL (ref 0.1–1)
BUN SERPL-MCNC: 22 MG/DL (ref 8–23)
CALCIUM SERPL-MCNC: 11.6 MG/DL (ref 8.7–10.5)
CHLORIDE SERPL-SCNC: 105 MMOL/L (ref 95–110)
CO2 SERPL-SCNC: 28 MMOL/L (ref 23–29)
CREAT SERPL-MCNC: 1.3 MG/DL (ref 0.5–1.4)
EST. GFR  (NO RACE VARIABLE): 43 ML/MIN/1.73 M^2
GLUCOSE SERPL-MCNC: 105 MG/DL (ref 70–110)
POTASSIUM SERPL-SCNC: 5 MMOL/L (ref 3.5–5.1)
PROT SERPL-MCNC: 6.4 G/DL (ref 6–8.4)
SODIUM SERPL-SCNC: 141 MMOL/L (ref 136–145)

## 2024-07-08 PROCEDURE — 80053 COMPREHEN METABOLIC PANEL: CPT | Mod: HCNC | Performed by: INTERNAL MEDICINE

## 2024-07-08 PROCEDURE — 36415 COLL VENOUS BLD VENIPUNCTURE: CPT | Mod: HCNC | Performed by: INTERNAL MEDICINE

## 2024-07-08 NOTE — TELEPHONE ENCOUNTER
----- Message from Comfort Hagen sent at 7/8/2024  4:05 PM CDT -----  Regarding: Appt  Contact: Pt  [phone]  Pt is calling to reschedule that is scheduled for 8/16 states she will be out of town, no dates avail please call

## 2024-07-08 NOTE — TELEPHONE ENCOUNTER
Mrs Deal called and is concerned that her symptoms are returning now that she is on Methotrexate rather than Prednisone. She is going out of town for 6 days starting on Wednesday. Dr Olea said he can see her this week and I will call Mrs Deal and offer her a appointment but Mrs Deal wanted Dr Briggs to know about the cough returning. Brittanie Wilkerson LPN

## 2024-07-16 ENCOUNTER — PATIENT MESSAGE (OUTPATIENT)
Dept: FAMILY MEDICINE | Facility: CLINIC | Age: 74
End: 2024-07-16
Payer: MEDICARE

## 2024-07-16 ENCOUNTER — TELEPHONE (OUTPATIENT)
Dept: PULMONOLOGY | Facility: CLINIC | Age: 74
End: 2024-07-16
Payer: MEDICARE

## 2024-07-16 DIAGNOSIS — G40.909 SEIZURE DISORDER: Primary | ICD-10-CM

## 2024-07-16 NOTE — TELEPHONE ENCOUNTER
----- Message from Margret RAYMOND Route sent at 7/16/2024 10:22 AM CDT -----  Regarding: Worsening Symptoms  Contact: pt  [phone]  Pt is calling in ref to  worsening symptoms since  medication change. Pt was previously seen by Dr. Briggs and medication     predniSONE (DELTASONE) 10 MG tablet was set up to wean off ramp up    methotrexate 2.5 MG Tab    starting at lowest dose. Pt says she is now experiencing shortness of breath, cough fatigue. Scheduled to see provider on 8/16 asking what she can do until then or a sooner appt. Patient Requesting Call Back @  [phone]

## 2024-07-16 NOTE — TELEPHONE ENCOUNTER
Left message on pot voicemail, informing her that I have received her message. I also advised pt that if she has any questions or concerns, she may contact the office.

## 2024-07-18 ENCOUNTER — PATIENT MESSAGE (OUTPATIENT)
Dept: TRANSPLANT | Facility: HOSPITAL | Age: 74
End: 2024-07-18
Payer: MEDICARE

## 2024-07-18 ENCOUNTER — TELEPHONE (OUTPATIENT)
Dept: PULMONOLOGY | Facility: CLINIC | Age: 74
End: 2024-07-18
Payer: MEDICARE

## 2024-07-18 DIAGNOSIS — B37.0 THRUSH, ORAL: ICD-10-CM

## 2024-07-18 DIAGNOSIS — D86.0 SARCOIDOSIS OF LUNG: ICD-10-CM

## 2024-07-18 DIAGNOSIS — R06.02 SHORTNESS OF BREATH: Primary | ICD-10-CM

## 2024-07-18 RX ORDER — PREDNISONE 10 MG/1
10 TABLET ORAL DAILY
Qty: 30 TABLET | Refills: 1 | Status: SHIPPED | OUTPATIENT
Start: 2024-07-18 | End: 2024-09-16

## 2024-07-18 RX ORDER — FLUCONAZOLE 100 MG/1
TABLET ORAL
Qty: 8 TABLET | Refills: 0 | Status: SHIPPED | OUTPATIENT
Start: 2024-07-18 | End: 2024-07-25

## 2024-07-18 RX ORDER — DOXYCYCLINE 100 MG/1
100 CAPSULE ORAL EVERY 12 HOURS
Qty: 14 CAPSULE | Refills: 0 | Status: SHIPPED | OUTPATIENT
Start: 2024-07-18 | End: 2024-07-25

## 2024-07-18 NOTE — TELEPHONE ENCOUNTER
Call was returned to patient in regards to her medications. Patient states that she's having symptoms from Methotrexate. Patient is having SOB. Patient is asking for Dr Briggs to go over her medications. I informed patient that I will sent Dr Briggs a message to review and advise. Patient verbalized understanding.

## 2024-07-18 NOTE — TELEPHONE ENCOUNTER
----- Message from Jeannie Corey sent at 7/18/2024  4:18 PM CDT -----  Regarding: Pt Advice  Contact: [phone]  Missed Callback         Pt returning callback from missed call. Requesting to speak with somebody in  office. Please call [phone]

## 2024-07-19 ENCOUNTER — TELEPHONE (OUTPATIENT)
Dept: PULMONOLOGY | Facility: CLINIC | Age: 74
End: 2024-07-19
Payer: MEDICARE

## 2024-07-19 ENCOUNTER — HOSPITAL ENCOUNTER (OUTPATIENT)
Dept: RADIOLOGY | Facility: HOSPITAL | Age: 74
Discharge: HOME | End: 2024-07-19
Attending: INTERNAL MEDICINE
Payer: MEDICARE

## 2024-07-19 DIAGNOSIS — R06.02 SHORTNESS OF BREATH: ICD-10-CM

## 2024-07-19 PROCEDURE — 71046 X-RAY EXAM CHEST 2 VIEWS: CPT | Mod: TC,FY

## 2024-07-19 PROCEDURE — 71046 X-RAY EXAM CHEST 2 VIEWS: CPT | Mod: 26,,, | Performed by: RADIOLOGY

## 2024-07-19 NOTE — TELEPHONE ENCOUNTER
I spoke with Ms Deal in regards to scheduling her labs per Dr Briggs. Patient states she's unable to get her labs done today. Patient states she's going out of town. Patient is scheduled to have her XR Chest at [facility] today at 9:30 AM. I advised patient if she have any questions or concerns to contact the office. Patient verbalized understanding.

## 2024-07-23 ENCOUNTER — LAB VISIT (OUTPATIENT)
Dept: LAB | Facility: HOSPITAL | Age: 74
End: 2024-07-23
Attending: INTERNAL MEDICINE
Payer: MEDICARE

## 2024-07-23 DIAGNOSIS — R06.02 SHORTNESS OF BREATH: ICD-10-CM

## 2024-07-23 PROCEDURE — 87205 SMEAR GRAM STAIN: CPT | Performed by: INTERNAL MEDICINE

## 2024-07-23 PROCEDURE — 87116 MYCOBACTERIA CULTURE: CPT | Performed by: INTERNAL MEDICINE

## 2024-07-23 PROCEDURE — 87206 SMEAR FLUORESCENT/ACID STAI: CPT | Performed by: INTERNAL MEDICINE

## 2024-07-23 PROCEDURE — 87070 CULTURE OTHR SPECIMN AEROBIC: CPT | Performed by: INTERNAL MEDICINE

## 2024-07-23 PROCEDURE — 87077 CULTURE AEROBIC IDENTIFY: CPT | Performed by: INTERNAL MEDICINE

## 2024-07-23 PROCEDURE — 87015 SPECIMEN INFECT AGNT CONCNTJ: CPT | Performed by: INTERNAL MEDICINE

## 2024-07-23 PROCEDURE — 87118 MYCOBACTERIC IDENTIFICATION: CPT | Performed by: INTERNAL MEDICINE

## 2024-07-23 PROCEDURE — 87186 SC STD MICRODIL/AGAR DIL: CPT | Performed by: INTERNAL MEDICINE

## 2024-07-24 LAB
ACID FAST MOD KINY STN SPEC: NORMAL
MYCOBACTERIUM SPEC QL CULT: NORMAL

## 2024-07-27 LAB
GRAM STN SPEC: NORMAL

## 2024-07-29 LAB
BACTERIA SPEC AEROBE CULT: ABNORMAL
GRAM STN SPEC: ABNORMAL

## 2024-07-31 LAB
ACID FAST MOD KINY STN SPEC: NORMAL
MYCOBACTERIUM SPEC QL CULT: NORMAL
MYCOBACTERIUM SPEC QL CULT: NORMAL

## 2024-08-08 ENCOUNTER — PATIENT MESSAGE (OUTPATIENT)
Dept: HEMATOLOGY/ONCOLOGY | Facility: CLINIC | Age: 74
End: 2024-08-08
Payer: MEDICARE

## 2024-08-08 DIAGNOSIS — R79.9 ABNORMAL FINDING OF BLOOD CHEMISTRY, UNSPECIFIED: ICD-10-CM

## 2024-08-08 DIAGNOSIS — D47.2 MGUS (MONOCLONAL GAMMOPATHY OF UNKNOWN SIGNIFICANCE): Primary | ICD-10-CM

## 2024-08-08 DIAGNOSIS — N20.0 NEPHROLITHIASIS: Primary | ICD-10-CM

## 2024-08-16 ENCOUNTER — LAB VISIT (OUTPATIENT)
Dept: LAB | Facility: HOSPITAL | Age: 74
End: 2024-08-16
Attending: INTERNAL MEDICINE
Payer: MEDICARE

## 2024-08-16 ENCOUNTER — OFFICE VISIT (OUTPATIENT)
Dept: UROLOGY | Facility: CLINIC | Age: 74
End: 2024-08-16
Payer: MEDICARE

## 2024-08-16 ENCOUNTER — OFFICE VISIT (OUTPATIENT)
Dept: PULMONOLOGY | Facility: CLINIC | Age: 74
End: 2024-08-16
Payer: MEDICARE

## 2024-08-16 VITALS
OXYGEN SATURATION: 96 % | DIASTOLIC BLOOD PRESSURE: 72 MMHG | SYSTOLIC BLOOD PRESSURE: 134 MMHG | BODY MASS INDEX: 19.27 KG/M2 | WEIGHT: 102.06 LBS | HEIGHT: 61 IN | HEART RATE: 65 BPM

## 2024-08-16 VITALS — BODY MASS INDEX: 19.31 KG/M2 | WEIGHT: 102.19 LBS

## 2024-08-16 DIAGNOSIS — N22 CALCULUS OF URINARY TRACT IN DISEASES CLASSIFIED ELSEWHERE: Primary | ICD-10-CM

## 2024-08-16 DIAGNOSIS — A31.9 MYCOBACTERIUM, ATYPICAL: ICD-10-CM

## 2024-08-16 DIAGNOSIS — D86.0 SARCOIDOSIS OF LUNG: ICD-10-CM

## 2024-08-16 DIAGNOSIS — N20.0 NEPHROLITHIASIS: ICD-10-CM

## 2024-08-16 DIAGNOSIS — D86.0 SARCOIDOSIS OF LUNG: Primary | ICD-10-CM

## 2024-08-16 LAB
ALBUMIN SERPL BCP-MCNC: 3.3 G/DL (ref 3.5–5.2)
ALP SERPL-CCNC: 59 U/L (ref 55–135)
ALT SERPL W/O P-5'-P-CCNC: 17 U/L (ref 10–44)
ANION GAP SERPL CALC-SCNC: 10 MMOL/L (ref 8–16)
AST SERPL-CCNC: 16 U/L (ref 10–40)
BASOPHILS # BLD AUTO: 0.04 K/UL (ref 0–0.2)
BASOPHILS NFR BLD: 0.6 % (ref 0–1.9)
BILIRUB SERPL-MCNC: 0.4 MG/DL (ref 0.1–1)
BUN SERPL-MCNC: 24 MG/DL (ref 8–23)
CALCIUM SERPL-MCNC: 12.6 MG/DL (ref 8.7–10.5)
CHLORIDE SERPL-SCNC: 102 MMOL/L (ref 95–110)
CO2 SERPL-SCNC: 26 MMOL/L (ref 23–29)
CREAT SERPL-MCNC: 1.3 MG/DL (ref 0.5–1.4)
DIFFERENTIAL METHOD BLD: ABNORMAL
EOSINOPHIL # BLD AUTO: 0.3 K/UL (ref 0–0.5)
EOSINOPHIL NFR BLD: 5 % (ref 0–8)
ERYTHROCYTE [DISTWIDTH] IN BLOOD BY AUTOMATED COUNT: 15.1 % (ref 11.5–14.5)
EST. GFR  (NO RACE VARIABLE): 42.6 ML/MIN/1.73 M^2
GLUCOSE SERPL-MCNC: 152 MG/DL (ref 70–110)
HCT VFR BLD AUTO: 35 % (ref 37–48.5)
HGB BLD-MCNC: 11.1 G/DL (ref 12–16)
IMM GRANULOCYTES # BLD AUTO: 0.02 K/UL (ref 0–0.04)
IMM GRANULOCYTES NFR BLD AUTO: 0.3 % (ref 0–0.5)
LYMPHOCYTES # BLD AUTO: 1.1 K/UL (ref 1–4.8)
LYMPHOCYTES NFR BLD: 16.6 % (ref 18–48)
MCH RBC QN AUTO: 29.3 PG (ref 27–31)
MCHC RBC AUTO-ENTMCNC: 31.7 G/DL (ref 32–36)
MCV RBC AUTO: 92 FL (ref 82–98)
MONOCYTES # BLD AUTO: 0.6 K/UL (ref 0.3–1)
MONOCYTES NFR BLD: 8.6 % (ref 4–15)
NEUTROPHILS # BLD AUTO: 4.4 K/UL (ref 1.8–7.7)
NEUTROPHILS NFR BLD: 68.9 % (ref 38–73)
NRBC BLD-RTO: 0 /100 WBC
PLATELET # BLD AUTO: 259 K/UL (ref 150–450)
PMV BLD AUTO: 11.5 FL (ref 9.2–12.9)
POTASSIUM SERPL-SCNC: 4.8 MMOL/L (ref 3.5–5.1)
PROT SERPL-MCNC: 6.7 G/DL (ref 6–8.4)
RBC # BLD AUTO: 3.79 M/UL (ref 4–5.4)
SODIUM SERPL-SCNC: 138 MMOL/L (ref 136–145)
WBC # BLD AUTO: 6.37 K/UL (ref 3.9–12.7)

## 2024-08-16 PROCEDURE — 87086 URINE CULTURE/COLONY COUNT: CPT | Mod: HCNC | Performed by: STUDENT IN AN ORGANIZED HEALTH CARE EDUCATION/TRAINING PROGRAM

## 2024-08-16 PROCEDURE — 85025 COMPLETE CBC W/AUTO DIFF WBC: CPT | Mod: HCNC | Performed by: INTERNAL MEDICINE

## 2024-08-16 PROCEDURE — 36415 COLL VENOUS BLD VENIPUNCTURE: CPT | Mod: HCNC | Performed by: INTERNAL MEDICINE

## 2024-08-16 PROCEDURE — 87088 URINE BACTERIA CULTURE: CPT | Mod: HCNC | Performed by: STUDENT IN AN ORGANIZED HEALTH CARE EDUCATION/TRAINING PROGRAM

## 2024-08-16 PROCEDURE — 99999 PR PBB SHADOW E&M-EST. PATIENT-LVL IV: CPT | Mod: PBBFAC,HCNC,, | Performed by: STUDENT IN AN ORGANIZED HEALTH CARE EDUCATION/TRAINING PROGRAM

## 2024-08-16 PROCEDURE — 80053 COMPREHEN METABOLIC PANEL: CPT | Mod: HCNC | Performed by: INTERNAL MEDICINE

## 2024-08-16 PROCEDURE — 87186 SC STD MICRODIL/AGAR DIL: CPT | Mod: HCNC | Performed by: STUDENT IN AN ORGANIZED HEALTH CARE EDUCATION/TRAINING PROGRAM

## 2024-08-16 PROCEDURE — 99999 PR PBB SHADOW E&M-EST. PATIENT-LVL V: CPT | Mod: PBBFAC,HCNC,, | Performed by: INTERNAL MEDICINE

## 2024-08-16 RX ORDER — ASPIRIN 81 MG
100 TABLET, DELAYED RELEASE (ENTERIC COATED) ORAL 2 TIMES DAILY PRN
COMMUNITY

## 2024-08-16 NOTE — PROGRESS NOTES
Subjective:   Patient ID: Kylah Deal is a 74 y.o. female    Chief Complaint:   Chief Complaint   Patient presents with    Sarcoidosis    Cough     HPI  73 yo female with history of probable Sarcoidosis. She is established with pulmonary and previously followed with Dr. Briggs, last visit on June 13, 2024. Other PMH includes former smoke (half pack per week quit age 58), CAD and DMII. Her BMI is 19.29, previously on Ozempic.    Her diagnosis of Sarcoidosis started with abnormally high calcium level on blood work, leading to CT thorax with bilateral perihilar and peribronchial irregular interstitial opacities. She initially underwent bronchoscopy with tbbx at outside hospital in January 2024, but negative for granulomatous disease and malignancy. A (mediastinal) lymph node biopsy with performed with IR on March 1, 2024, resulted in non-caseating granuloma. She was seen by oncology at [facility], and presented at Tumor Board, with recommendation for further sampling. She then underwent a EUS on 4/3/24 with finding of enlarged lymph nodes in middle paraesophageal mediastinum, biopsied with finding of rare granulomas.    Her symptoms included shortness of breath with exertion, cough and significant fatigue. She also experienced night sweats. She was started on Prednisone initially at 30 mg at the end of May 2024, which made her feel great, but this led to uncontrolled glycemic index and difficulty with adjusting her novolog. She was seen in a clinic visit on June 13th 2024, at which time she was recommended to change to MTX as steroid sparring therapy - she started at MTX 2.5 mg weekly (first dose on June 17th) and increased to 5 mg on July 1st. She was instructed to wean off steroids from 15 mg every 5 days and completed steroids on July 3rd 2024. Her fatigue recurred during the wean and persisted off steroids and was is instructed to resume steroids on July 18th, 2024 at 10 mg. Currently her Methotrexate is up to  maintenance dose of 10 mg weekly since . She is taking folic acid daily.    Currently, on MTX 10 mg weekly and Prednisone 10 mg, she does feel better, but still experiencing fatigue which is the most cumbersome symptom. Her glucose control is better, but still experiencing fluctuation in glucose levels. She will be seeing endocrinologist next week. She believes a higher dose would make her feel more energy.    She denies any nausea, vomiting, gastrointestinal symptoms. No abdominal pain.     She experiences brain fogginess for an hour after steroids. Appetite is good. Does have some blurry vision. Has not been able to see ophthalmologist since diagnosis of Sarcoidosis.    Of note, her sputum culture from 24 is positive for Mycobacterium species - still in processing     She follows with oncologist, Dr. Sanchez. Myeloma labs were performed, and found to have monoclonal protein. A bone marrow biopsy was then performed and there is some concern for possible indolent LPL. She will be following with Dr. Sanchez later in the year.      History:  Former smoker. Stopped in .  1/2ppw age [age]   No known occupational exposures.  Father was a   Dad was a heavy smoker- 3ppd;  of lung cancer  Mother with COPD      Imaging:  CT May 10, 2024:  Retroperitoneum: Retrocrural and bilateral upper abdominal para-aortic adenopathy appears similar to the prior exam with adenopathy extending into the anastacia hepatis.     Vasculature: Severe atherosclerosis of the abdominal aorta and its branches.     Bladder: No evidence of wall thickening.     Reproductive organs: Hysterectomy suspected.     Bones: No suspicious lytic or blastic lesions.     Impression:     Stable appearance of the chest abdomen pelvis with mediastinal, retrocrural, and retroperitoneal confluent adenopathy and extensive Svetlana lymphatic nodular and confluent bilateral lung opacities.  Findings that can be seen in patient's provided diagnosis of  sarcoidosis and correlation with tissue diagnosis, if not already performed is suggested.  Lymphoproliferative disorder could conceivably have a similar appearance but thought less likely     It is difficult to measure discrete lesions according to recist criteria.     Stable mild intrahepatic biliary dilatation     Severe atherosclerosis     Right nephrolithiasis, proximal right hydroureter and bilateral urothelial wall thickening suggesting chronic inflammation.     Lesion 1: organ (max 2 lesions per organ). current measurement - long axis for mass (minimum baseline 1 cm), short axis for node (minimum baseline 1.5) cm. Series  Image . Prior measurement  cm.     Lesion 2: organ (max 2 lesions per organ). current measurement - long axis for mass (minimum baseline 1 cm), short axis for node (minimum baseline 1.5) cm. Series  Image . Prior measurement  cm.     Lesion 3: organ (max 2 lesions per organ). current measurement - long axis for mass (minimum baseline 1 cm), short axis for node (minimum baseline 1.5) cm. Series  Image . Prior measurement  cm.     Lesion 4: organ (max 2 lesions per organ). current measurement - long axis for mass (minimum baseline 1 cm), short axis for node (minimum baseline 1.5) cm. Series  Image . Prior measurement  cm.     Lesion 5: organ (max 2 lesions per organ). current measurement - long axis for mass (minimum baseline 1 cm), short axis for node (minimum baseline 1.5) cm. Series  Image . Prior measurement  cm.    Path:  EUS 4/3/24:  1 and 2.  Svetlana-gastric mass, endoscopic ultrasound-guided (EUS) biopsies with pathologist adequacy:   Parts 1 and 2 show bland reactive histiocytic/myofibroblastic tissue with rare granulomas and benign gastrointestinal elements   No evidence of malignancy    4/29/24:        BONE MARROW, LEFT ILIAC CREST (ASPIRATE SMEAR, TOUCH IMPRINT, CLOT SECTION, AND CORE BIOPSY):  -- CELLULAR MARROW WITH TRILINEAGE HEMATOPOIESIS AND MEGAKARYOCYTIC HYPERPLASIA.  -- KAPPA  RESTRICTED MONOTYPIC B LYMPHOCYTES (1.2%) AND ATYPICAL PLASMA CELLS (<0.1%) DETECTED BY FLOW CYTOMETRIC ANALYSIS.  -- NO MORPHOLOGIC EVIDENCE OF AMYLOID DEPOSITION.  -- NO GRANULOMA.         Outside facility:  CT Guided Lung Bx  Date: 3/1/24  MEDIASTINAL BIOPSY:   - FOCAL NONCASEATING GRANULOMATOUS INFLAMMATION.       Bronchoscopy  Date: 1/31/24  1. LUNG, RIGHT LOWER LOBE, BRONCHIAL BIOPSY:   - BENIGN LOWER RESPIRATORY TRACT COLUMNAR MUCOSA WITH MINIMAL FOCI OF   SQUAMOUS METAPLASTIC ELEMENTS.   - NEGATIVE FOR CARCINOMA AND GRANULOMATOUS DISEASE.     2. LUNG, LEFT LOWER LOBE, BIOPSY:   - BENIGN BRONCHIAL WALL AND PULMONARY ALVEOLAR PARENCHYMAL TISSUES.   - NO EVIDENCE OF CARCINOMA OR GRANULOMATOUS DISEASE.   - NEGATIVE FOR ACTIVE PNEUMONITIS.     3. LUNG, RIGHT MAINSTEM BRONCHUS, BIOPSY:   - MINIMAL SMALL FRAGMENTS OF BRONCHIAL MUCOSA WITH SQUAMOUS METAPLASIA.   - NO EVIDENCE OF CARCINOMA OR GRANULOMATOUS DISEASE.     1. BRONCHIAL WASHING AND BAL:   - NEGATIVE FOR MALIGNANCY.     2. BRONCHIAL BRUSHINGS AND BAL:   - NEGATIVE FOR MALIGNANCY.     3. BRONCHIAL BRUSHINGS:   - NEGATIVE FOR MALIGNANCY; SQUAMOUS METAPLASIA AND REACTIVE ATYPIA ARE   PRESENT.           Objective:   Vitals reviewed   Physical Exam  Vitals reviewed.   Constitutional:       Appearance: Normal appearance.   HENT:      Head: Normocephalic.   Cardiovascular:      Rate and Rhythm: Normal rate and regular rhythm.   Pulmonary:      Effort: Pulmonary effort is normal.   Musculoskeletal:         General: No swelling.   Skin:     Comments: Bruises b/l upper extremity (on plavix)   Neurological:      General: No focal deficit present.      Mental Status: She is alert and oriented to person, place, and time.   Psychiatric:         Mood and Affect: Mood normal.         Behavior: Behavior normal.         Assessment:     Problem List Items Addressed This Visit          Pulmonary    Sarcoidosis of lung - Primary    Relevant Orders    Comprehensive Metabolic  Panel    CBC auto differential    AFB CULTURE & SMEAR     Other Visit Diagnoses       Mycobacterium, atypical                  Plan:       1. Sarcoidosis of lung  Non-caseating granulomas on biopsies lymph node biopsies (IR March 2024; EUS April 2024). No pathology currently evident for malignancy. She is following with oncology for possible LPL.  Radiologically, stage III  - symptoms (particularly fatigue) significantly improved after initiation of steroid and recurred went wean off while titrating up Methotrexate. Of note, she was weaned off steroids while she was still on only 5 mg of MTX  - she has resumed steroids at prednisone 10 mg since July 18th. Still feels fatigued, but better than when she was off it completely.  - discussed benefit of steroid, but potential side effects including hyperglycemia, confusion, risk of infections and now positive AFB for mycobacterium species (still preliminary)  - since she feels dramatically better on steroids, she would like to go up on dosing for a bit  - increase prednisone to 15 mg daily for 2 weeks, then drop down to 10 mg daily, then dosing to be determined. I suspect goal to wean by 2.5 mg or 5 mg thereafter. I will see patient in clinic to make that determination  - Continue Mtx at 10 mg weekly. Room to go up on dosing - to be discussed on next visit  - continue folic acid daily  - discussed precautions for infection  - check cbc/cmp q1 month for next 2 months      - Quantiferon - negative (6/13/24)  - eye exam - patient will see ophthalmologist later in 2024  - EKG - patient follows with cardiology. Hx of CAD post CABG 2019.   - PFT - June 2024 - normal hetal, mild restriction, dlco moderately reduced  - Immunization: PPSV 24 2021; t-dap 2020      2. Mycobacterium, atypical  1/1 sputum sample positive - preliminary  - AFB CULTURE & SMEAR; Standing x 2      Follow up 6 - 8 weeks for symptoms and goal to wean down steroids         50 minutes of total time spent on  the encounter, which includes face to face time and non-face to face time preparing to see the patient (eg, review of tests), Obtaining and/or reviewing separately obtained history, Documenting clinical information in the electronic or other health record, Independently interpreting results (not separately reported) and communicating results to the patient/family/caregiver, or Care coordination (not separately reported).

## 2024-08-16 NOTE — PROGRESS NOTES
Patient ID: Kylah Deal is a 74 y.o. female.    Chief Complaint: Nephrolithiasis    Referral: Karla Sanchez MD  [address] [place], [place] [id number] [place] [address]  74 y.o. who presents to the Urology clinic for evaluation of urolithiasis. CT from 5/2024 revealed 1 cm stone. Denies gross hematuria, dysuria, flank pain, nausea, vomiting. Notes hx of recurrent UTIs in the past 2023/2024 , Klebsiella. She is on blood thinners  Walking through airport from recent trip, resulted in chest pain. Her cardiologist is Dr. Bonner for CAD.   History of mixed incontinence and cystocele. She has previously undergone an anterior repair with single incision sling placement 09/13/2022.   Medically Necessary ROS documented in HPI    Review of Systems   Constitutional:  Negative for chills and fever.   HENT:  Negative for congestion and sore throat.    Eyes:  Negative for blurred vision and redness.   Respiratory:  Negative for cough and shortness of breath.    Cardiovascular:  Negative for chest pain and leg swelling.   Gastrointestinal:  Negative for abdominal pain, blood in stool, constipation, nausea and vomiting.   Genitourinary:  Positive for urgency. Negative for dysuria, flank pain, frequency and hematuria.   Musculoskeletal:  Negative for back pain.   Skin:  Negative for rash.   Neurological:  Negative for dizziness and headaches.   Psychiatric/Behavioral:  Negative for depression. The patient is not nervous/anxious.          Past Medical History  Active Ambulatory Problems     Diagnosis Date Noted    Convulsions, unspecified convulsion type 02/29/2024    Aortic atherosclerosis 02/29/2024    Coronary artery disease involving native coronary artery of native heart without angina pectoris 03/26/2024    Type 2 diabetes mellitus without complication, without long-term current use of insulin 03/26/2024    HTN (hypertension) 03/26/2024    Age-related osteoporosis without current pathological fracture 03/26/2024    History  of tobacco abuse 03/26/2024    Hx of CABG 12/19/2019    Hypercalcemia due to granulomatous disease 01/11/2024    Grade I diastolic dysfunction 10/27/2021    Gastroesophageal reflux disease without esophagitis 11/29/2018    Essential tremor 10/27/2021    Hyperlipidemia 11/29/2018    Hypothyroidism 06/01/2021    Long term current use of aspirin 10/27/2021    Hypertensive heart disease with heart failure 03/26/2024    Sarcoidosis of lung 06/13/2024    Acute renal failure 06/13/2024    Thrush, oral 06/13/2024     Resolved Ambulatory Problems     Diagnosis Date Noted    Abnormal CT of the chest 03/18/2024    Dry mouth and eyes 04/24/2024     Past Medical History:   Diagnosis Date    CAD (coronary artery disease)     Diabetes mellitus     Hypertension     Migraine     Seizures          Past Surgical History  Past Surgical History:   Procedure Laterality Date    APPENDECTOMY      CHOLECYSTECTOMY      CORONARY ANGIOPLASTY WITH STENT PLACEMENT  2017    CORONARY ARTERY BYPASS GRAFT      ENDOSCOPIC ULTRASOUND OF UPPER GASTROINTESTINAL TRACT N/A 04/03/2024    Procedure: ULTRASOUND, UPPER GI TRACT, ENDOSCOPIC;  Surgeon: Lavell Lomax MD;  Location: [facility];  Service: Endoscopy;  Laterality: N/A;  4/1 portal- EUS-guided biopsy of anastacia hepatis mass.    HEEL SPUR EXCISION  1997    HIP FRACTURE SURGERY Left 2018    HYSTERECTOMY      TONSILLECTOMY      URETHRAL SLING  2022    WRIST SURGERY Right 2005       Social History       Medications    Current Outpatient Medications:     albuterol (PROVENTIL/VENTOLIN HFA) 90 mcg/actuation inhaler, Inhale 2 puffs into the lungs every 4 (four) hours as needed., Disp: , Rfl:     aspirin (ECOTRIN) 81 MG EC tablet, Take 81 mg by mouth once daily., Disp: , Rfl:     carvediloL (COREG) 12.5 MG tablet, Take 1 tablet by mouth 2 (two) times daily with meals., Disp: , Rfl:     cholecalciferol, vitamin D3, (VITAMIN D3) 50 mcg (2,000 unit) Cap capsule, Take 1 tablet by mouth., Disp: , Rfl:      clopidogreL (PLAVIX) 75 mg tablet, Take 1 tablet by mouth once daily., Disp: , Rfl:     denosumab (PROLIA) 60 mg/mL Syrg, Inject 60 mg into the skin every 6 (six) months., Disp: , Rfl:     evolocumab (REPATHA SURECLICK SUBQ), Inject into the skin., Disp: , Rfl:     ezetimibe (ZETIA) 10 mg tablet, Take 10 mg by mouth once daily., Disp: , Rfl:     fluticasone (VERAMYST) 27.5 mcg/actuation nasal spray, 2 sprays by Nasal route once daily., Disp: , Rfl:     folic acid (FOLVITE) 1 MG tablet, Take 1 tablet (1 mg total) by mouth once daily., Disp: 360 tablet, Rfl: 0    furosemide (LASIX) 20 MG tablet, Take 20 mg by mouth 2 (two) times daily., Disp: , Rfl:     hydrALAZINE (APRESOLINE) 50 MG tablet, Take 1 tablet by mouth 2 (two) times daily., Disp: , Rfl:     insulin aspart U-100 (NOVOLOG) 100 unit/mL (3 mL) InPn pen, Inject 2 Units into the skin as needed., Disp: , Rfl:     levETIRAcetam (KEPPRA) 500 MG Tab, Take 1 tablet (500 mg total) by mouth 2 (two) times daily., Disp: 60 tablet, Rfl: 0    linaGLIPtin (TRADJENTA) 5 mg Tab tablet, Take 5 mg by mouth once daily., Disp: , Rfl:     lisinopriL (PRINIVIL,ZESTRIL) 40 MG tablet, Take 1 tablet by mouth once daily., Disp: , Rfl:     methotrexate 2.5 MG Tab, Take 1 tablet (2.5 mg total) by mouth every 7 days for 14 days, THEN 2 tablets (5 mg total) every 7 days for 14 days, THEN 3 tablets (7.5 mg total) every 7 days for 14 days, THEN 4 tablets (10 mg total) every 7 days., Disp: 18 tablet, Rfl: 0    montelukast (SINGULAIR) 10 mg tablet, Take 10 mg by mouth., Disp: , Rfl:     nitroGLYCERIN (NITROSTAT) 0.4 MG SL tablet, Place 0.4 mg under the tongue., Disp: , Rfl:     pantoprazole (PROTONIX) 40 MG tablet, Take 40 mg by mouth every morning., Disp: , Rfl:     predniSONE (DELTASONE) 10 MG tablet, Take 1 tablet (10 mg total) by mouth once daily., Disp: 30 tablet, Rfl: 1    solifenacin (VESICARE) 10 MG tablet, Take 1 tablet by mouth once daily., Disp: , Rfl:     droNABinol (MARINOL) 2.5  MG capsule, Take 2.5 mg by mouth 2 (two) times daily before meals., Disp: , Rfl:     guaiFENesin-codeine 100-10 mg/5 ml (TUSSI-ORGANIDIN NR)  mg/5 mL syrup, Take 10 mLs by mouth every 4 (four) hours as needed., Disp: , Rfl:     TRELEGY ELLIPTA 200-62.5-25 mcg inhaler, Inhale 1 puff into the lungs., Disp: , Rfl:     Allergies  Review of patient's allergies indicates:   Allergen Reactions    Atorvastatin calcium Other (See Comments)     Pain    Metformin Diarrhea     Diarrhea and cramping    Rosuvastatin Other (See Comments)     pain    Statins-hmg-coa reductase inhibitors        Patient's PMH, FH, Social hx, Medications, allergies reviewed and updated as pertinent to today's visit    Objective:      Physical Exam  Constitutional:       Appearance: She is well-developed.   HENT:      Head: Normocephalic and atraumatic.   Eyes:      Conjunctiva/sclera: Conjunctivae normal.   Pulmonary:      Effort: Pulmonary effort is normal. No respiratory distress.   Abdominal:      General: Abdomen is flat. There is no distension.      Palpations: Abdomen is soft. There is no mass.      Tenderness: There is no abdominal tenderness. There is no guarding.   Skin:     General: Skin is warm.      Findings: No rash.   Neurological:      Mental Status: She is alert and oriented to person, place, and time.   Psychiatric:         Behavior: Behavior normal.           Imaging results: personally reviewed imaging with the following findings   1 cm linear shaped stone in R kidney  937 HU density      Assessment:       1. Calculus of urinary tract in diseases classified elsewhere    2. Nephrolithiasis        Plan:       Repeat CT advised to ensure no significant size change which would warrant different treatment modality suggestion  Discussed if stable in size observation is reasonable for now  Ua/ucx  Since pt on blood thinners advise ureteroscopic stone removal instead of ESWL  Discussed PCNL would be morbid for stone of this  size  Stone former's diet discussed

## 2024-08-17 LAB — BACTERIA UR CULT: ABNORMAL

## 2024-08-19 ENCOUNTER — TELEPHONE (OUTPATIENT)
Dept: HEMATOLOGY/ONCOLOGY | Facility: CLINIC | Age: 74
End: 2024-08-19
Payer: MEDICARE

## 2024-08-19 ENCOUNTER — TELEPHONE (OUTPATIENT)
Dept: UROLOGY | Facility: CLINIC | Age: 74
End: 2024-08-19
Payer: MEDICARE

## 2024-08-19 ENCOUNTER — PATIENT MESSAGE (OUTPATIENT)
Dept: PULMONOLOGY | Facility: CLINIC | Age: 74
End: 2024-08-19
Payer: MEDICARE

## 2024-08-19 DIAGNOSIS — N30.00 ACUTE CYSTITIS WITHOUT HEMATURIA: Primary | ICD-10-CM

## 2024-08-19 RX ORDER — CIPROFLOXACIN 500 MG/1
250 TABLET ORAL EVERY 12 HOURS
Qty: 3 TABLET | Refills: 0 | Status: SHIPPED | OUTPATIENT
Start: 2024-08-19 | End: 2024-08-22

## 2024-08-19 NOTE — TELEPHONE ENCOUNTER
Pt was contacted. Lvm   ----- Message from Annamarie Lake MD sent at 8/19/2024  1:12 PM CDT -----  Abx sent for UTI

## 2024-08-19 NOTE — TELEPHONE ENCOUNTER
----- Message from Karla Sanchez MD sent at 8/18/2024  1:54 PM CDT -----  Hi team,  For Mrs Louis, I noticed labs and follow up were scheduled on 11/15. Please schedule the follow up appt one week after labs as some of those results take up to a week to result    Thanks!!

## 2024-08-19 NOTE — TELEPHONE ENCOUNTER
Pt was contacted pt informed of ucx abnd abx sent to pharmacy.  ----- Message from Yumiko Markham sent at 8/19/2024  2:23 PM CDT -----  .Type:  Patient Returning Call    Who Called: Self     Who Left Message for Patient: Geno,    Does the patient know what this is regarding?: yes     Would the patient rather a call back or a response via [facility]? Call Back     Best Call Back Number: [phone] (home)       Additional Information:

## 2024-08-21 ENCOUNTER — TELEPHONE (OUTPATIENT)
Dept: UROLOGY | Facility: CLINIC | Age: 74
End: 2024-08-21
Payer: MEDICARE

## 2024-08-21 ENCOUNTER — HOSPITAL ENCOUNTER (OUTPATIENT)
Dept: RADIOLOGY | Facility: HOSPITAL | Age: 74
Discharge: HOME | End: 2024-08-21
Attending: STUDENT IN AN ORGANIZED HEALTH CARE EDUCATION/TRAINING PROGRAM
Payer: MEDICARE

## 2024-08-21 DIAGNOSIS — N30.80 EMPHYSEMATOUS CYSTITIS: Primary | ICD-10-CM

## 2024-08-21 DIAGNOSIS — N22 CALCULUS OF URINARY TRACT IN DISEASES CLASSIFIED ELSEWHERE: ICD-10-CM

## 2024-08-21 PROCEDURE — 74176 CT ABD & PELVIS W/O CONTRAST: CPT | Mod: TC,HCNC

## 2024-08-21 RX ORDER — CEPHALEXIN 500 MG/1
500 CAPSULE ORAL EVERY 12 HOURS
Qty: 24 CAPSULE | Refills: 0 | Status: SHIPPED | OUTPATIENT
Start: 2024-08-21 | End: 2024-09-02

## 2024-08-21 NOTE — TELEPHONE ENCOUNTER
Pt was contacted pt informed of results and abx sent to pharmacy. Appt scheduled.  ----- Message from Annamarie Lake MD sent at 8/21/2024 10:45 AM CDT -----  Her CT shows an aggressive bladder infection, I recommend 2 weeks of antibiotics, I sent more to her pharmacy. I recommend stone treatment, please arrange follow up, no OB, to discuss

## 2024-08-21 NOTE — PROGRESS NOTES
Her CT shows an aggressive bladder infection, I recommend 2 weeks of antibiotics, I sent more to her pharmacy. I recommend stone treatment, please arrange follow up, no OB, to discuss

## 2024-08-24 DIAGNOSIS — G40.909 SEIZURE DISORDER: ICD-10-CM

## 2024-08-26 ENCOUNTER — LAB VISIT (OUTPATIENT)
Dept: LAB | Facility: HOSPITAL | Age: 74
End: 2024-08-26
Attending: INTERNAL MEDICINE
Payer: MEDICARE

## 2024-08-26 DIAGNOSIS — D86.0 SARCOIDOSIS OF LUNG: ICD-10-CM

## 2024-08-26 LAB
ALBUMIN SERPL BCP-MCNC: 3.2 G/DL (ref 3.5–5.2)
ALP SERPL-CCNC: 45 U/L (ref 55–135)
ALT SERPL W/O P-5'-P-CCNC: 15 U/L (ref 10–44)
ANION GAP SERPL CALC-SCNC: 7 MMOL/L (ref 8–16)
AST SERPL-CCNC: 12 U/L (ref 10–40)
BASOPHILS # BLD AUTO: 0.03 K/UL (ref 0–0.2)
BASOPHILS NFR BLD: 0.4 % (ref 0–1.9)
BILIRUB SERPL-MCNC: 0.4 MG/DL (ref 0.1–1)
BUN SERPL-MCNC: 34 MG/DL (ref 8–23)
CALCIUM SERPL-MCNC: 10.9 MG/DL (ref 8.7–10.5)
CHLORIDE SERPL-SCNC: 107 MMOL/L (ref 95–110)
CO2 SERPL-SCNC: 27 MMOL/L (ref 23–29)
CREAT SERPL-MCNC: 1.3 MG/DL (ref 0.5–1.4)
DIFFERENTIAL METHOD BLD: ABNORMAL
EOSINOPHIL # BLD AUTO: 0.2 K/UL (ref 0–0.5)
EOSINOPHIL NFR BLD: 2.1 % (ref 0–8)
ERYTHROCYTE [DISTWIDTH] IN BLOOD BY AUTOMATED COUNT: 15.3 % (ref 11.5–14.5)
EST. GFR  (NO RACE VARIABLE): 43 ML/MIN/1.73 M^2
GLUCOSE SERPL-MCNC: 140 MG/DL (ref 70–110)
HCT VFR BLD AUTO: 32.7 % (ref 37–48.5)
HGB BLD-MCNC: 10.7 G/DL (ref 12–16)
IMM GRANULOCYTES # BLD AUTO: 0.11 K/UL (ref 0–0.04)
IMM GRANULOCYTES NFR BLD AUTO: 1.4 % (ref 0–0.5)
LYMPHOCYTES # BLD AUTO: 1.3 K/UL (ref 1–4.8)
LYMPHOCYTES NFR BLD: 17.5 % (ref 18–48)
MCH RBC QN AUTO: 30.6 PG (ref 27–31)
MCHC RBC AUTO-ENTMCNC: 32.7 G/DL (ref 32–36)
MCV RBC AUTO: 93 FL (ref 82–98)
MONOCYTES # BLD AUTO: 0.9 K/UL (ref 0.3–1)
MONOCYTES NFR BLD: 11.3 % (ref 4–15)
NEUTROPHILS # BLD AUTO: 5.1 K/UL (ref 1.8–7.7)
NEUTROPHILS NFR BLD: 67.3 % (ref 38–73)
NRBC BLD-RTO: 0 /100 WBC
PLATELET # BLD AUTO: 275 K/UL (ref 150–450)
PMV BLD AUTO: 10.8 FL (ref 9.2–12.9)
POTASSIUM SERPL-SCNC: 4.9 MMOL/L (ref 3.5–5.1)
PROT SERPL-MCNC: 6.2 G/DL (ref 6–8.4)
RBC # BLD AUTO: 3.5 M/UL (ref 4–5.4)
SODIUM SERPL-SCNC: 141 MMOL/L (ref 136–145)
WBC # BLD AUTO: 7.6 K/UL (ref 3.9–12.7)

## 2024-08-26 PROCEDURE — 85025 COMPLETE CBC W/AUTO DIFF WBC: CPT | Mod: HCNC | Performed by: INTERNAL MEDICINE

## 2024-08-26 PROCEDURE — 36415 COLL VENOUS BLD VENIPUNCTURE: CPT | Mod: HCNC | Performed by: INTERNAL MEDICINE

## 2024-08-26 PROCEDURE — 80053 COMPREHEN METABOLIC PANEL: CPT | Mod: HCNC | Performed by: INTERNAL MEDICINE

## 2024-08-26 RX ORDER — LEVETIRACETAM 500 MG/1
500 TABLET ORAL 2 TIMES DAILY
Qty: 180 TABLET | Refills: 1 | Status: SHIPPED | OUTPATIENT
Start: 2024-08-26

## 2024-08-26 NOTE — TELEPHONE ENCOUNTER
Refill Routing Note   Medication(s) are not appropriate for processing by [facility] for the following reason(s):        Outside of protocol    [facility] action(s):  Route             Appointments  past 12m or future 3m with PCP    Date Provider   Last Visit   2/29/2024 Osmar Cruz MD   Next Visit   Visit date not found Osmar Cruz MD   ED visits in past 90 days: 0        Note composed:7:59 AM 08/26/2024

## 2024-08-27 NOTE — PROGRESS NOTES
Patient ID: Kylah Deal is a 74 y.o. female.    Chief Complaint: r Renal stone    Referral: No referring provider defined for this encounter.     HPI  74 y.o. who presents to the Urology clinic for evaluation of recurrent UTIs, found to have R renal stones, here to discuss management. Patient denies R flank pain, nausea, vomiting. Hx of MI.     Medically Necessary ROS documented in HPI  Review of Systems   Constitutional:  Negative for chills and fever.   HENT:  Negative for congestion and sore throat.    Eyes:  Negative for blurred vision and redness.   Respiratory:  Negative for cough and shortness of breath.    Cardiovascular:  Negative for chest pain and leg swelling.   Gastrointestinal:  Negative for abdominal pain, blood in stool, constipation, nausea and vomiting.   Genitourinary:  Negative for dysuria, flank pain, frequency, hematuria and urgency.   Musculoskeletal:  Negative for back pain.   Skin:  Negative for rash.   Neurological:  Negative for dizziness and headaches.   Psychiatric/Behavioral:  Negative for depression. The patient is not nervous/anxious.          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 01/11/2024    Grade I diastolic dysfunction 10/27/2021    Gastroesophageal reflux disease without esophagitis 11/29/2018    Essential tremor 10/27/2021    Hyperlipidemia 11/29/2018    Hypothyroidism 06/01/2021    Long term current use of aspirin  10/27/2021    Hypertensive heart disease with heart failure 03/26/2024    Sarcoidosis of lung 06/13/2024    Acute renal failure 06/13/2024    Thrush, oral 06/13/2024     Resolved Ambulatory Problems     Diagnosis Date Noted    Abnormal CT of the chest 03/18/2024    Dry mouth and eyes 04/24/2024     Past Medical History:   Diagnosis Date    CAD (coronary artery disease)     Diabetes mellitus     Hypertension     Migraine     Seizures          Past Surgical History  Past Surgical History:   Procedure Laterality Date    APPENDECTOMY      CHOLECYSTECTOMY      CORONARY ANGIOPLASTY WITH STENT PLACEMENT  2017    CORONARY ARTERY BYPASS GRAFT      ENDOSCOPIC ULTRASOUND OF UPPER GASTROINTESTINAL TRACT N/A 04/03/2024    Procedure: ULTRASOUND, UPPER GI TRACT, ENDOSCOPIC;  Surgeon: Lavell Lomax MD;  Location: [facility] ENDO;  Service: Endoscopy;  Laterality: N/A;  4/1 portal- EUS-guided biopsy of anastacia hepatis mass.    HEEL SPUR EXCISION  1997    HIP FRACTURE SURGERY Left 2018    HYSTERECTOMY      TONSILLECTOMY      URETHRAL SLING  2022    WRIST SURGERY Right 2005       Social History       Medications    Current Outpatient Medications:     albuterol (PROVENTIL/VENTOLIN HFA) 90 mcg/actuation inhaler, Inhale 2 puffs into the lungs every 4 (four) hours as needed., Disp: , Rfl:     aspirin (ECOTRIN) 81 MG EC tablet, Take 81 mg by mouth once daily., Disp: , Rfl:     carvediloL (COREG) 12.5 MG tablet, Take 1 tablet by mouth 2 (two) times daily with meals., Disp: , Rfl:     cholecalciferol, vitamin D3, (VITAMIN D3) 50 mcg (2,000 unit) Cap capsule, Take 1 tablet by mouth., Disp: , Rfl:     clopidogreL (PLAVIX) 75 mg tablet, Take 1 tablet by mouth once daily., Disp: , Rfl:     denosumab (PROLIA) 60 mg/mL Syrg, Inject 60 mg into the skin every 6 (six) months., Disp: , Rfl:     docusate sodium 100 mg capsule, Take 100 mg by mouth 2 (two) times daily as needed for Constipation., Disp: , Rfl:     evolocumab (REPATHA SURECLICK SUBQ), Inject  into the skin., Disp: , Rfl:     ezetimibe (ZETIA) 10 mg tablet, Take 10 mg by mouth once daily., Disp: , Rfl:     fluticasone (VERAMYST) 27.5 mcg/actuation nasal spray, 2 sprays by Nasal route once daily., Disp: , Rfl:     folic acid (FOLVITE) 1 MG tablet, Take 1 tablet (1 mg total) by mouth once daily., Disp: 360 tablet, Rfl: 0    furosemide (LASIX) 20 MG tablet, Take 20 mg by mouth 2 (two) times daily., Disp: , Rfl:     hydrALAZINE (APRESOLINE) 50 MG tablet, Take 1 tablet by mouth 2 (two) times daily., Disp: , Rfl:     insulin aspart U-100 (NOVOLOG) 100 unit/mL (3 mL) InPn pen, Inject 2 Units into the skin as needed., Disp: , Rfl:     levETIRAcetam (KEPPRA) 500 MG Tab, TAKE 1 TABLET(500 MG) BY MOUTH TWICE DAILY, Disp: 180 tablet, Rfl: 1    linaGLIPtin (TRADJENTA) 5 mg Tab tablet, Take 5 mg by mouth once daily., Disp: , Rfl:     lisinopriL (PRINIVIL,ZESTRIL) 40 MG tablet, Take 1 tablet by mouth once daily., Disp: , Rfl:     methotrexate 2.5 MG Tab, Take 1 tablet (2.5 mg total) by mouth every 7 days for 14 days, THEN 2 tablets (5 mg total) every 7 days for 14 days, THEN 3 tablets (7.5 mg total) every 7 days for 14 days, THEN 4 tablets (10 mg total) every 7 days., Disp: 18 tablet, Rfl: 0    montelukast (SINGULAIR) 10 mg tablet, Take 10 mg by mouth., Disp: , Rfl:     nitroGLYCERIN (NITROSTAT) 0.4 MG SL tablet, Place 0.4 mg under the tongue., Disp: , Rfl:     pantoprazole (PROTONIX) 40 MG tablet, Take 40 mg by mouth every morning., Disp: , Rfl:     predniSONE (DELTASONE) 10 MG tablet, Take 1 tablet (10 mg total) by mouth once daily., Disp: 30 tablet, Rfl: 1    solifenacin (VESICARE) 10 MG tablet, Take 1 tablet by mouth once daily., Disp: , Rfl:     Allergies  Review of patient's allergies indicates:   Allergen Reactions    Atorvastatin calcium Other (See Comments)     Pain    Metformin Diarrhea     Diarrhea and cramping    Rosuvastatin Other (See Comments)     pain    Statins-hmg-coa reductase inhibitors         Patient's PMH, FH, Social hx, Medications, allergies reviewed and updated as pertinent to today's visit    Objective:      Physical Exam  Constitutional:       Appearance: She is well-developed.   HENT:      Head: Normocephalic and atraumatic.   Eyes:      Conjunctiva/sclera: Conjunctivae normal.   Pulmonary:      Effort: Pulmonary effort is normal. No respiratory distress.   Abdominal:      General: Abdomen is flat. There is no distension.      Palpations: Abdomen is soft. There is no mass.      Tenderness: There is no abdominal tenderness. There is no right CVA tenderness, left CVA tenderness or guarding.   Skin:     General: Skin is warm.      Findings: No rash.   Neurological:      Mental Status: She is alert and oriented to person, place, and time.   Psychiatric:         Behavior: Behavior normal.             Imaging results: personally reviewed imaging with the following findings 1.3 R renal stone, non obstructing  Assessment:       1. Kidney stone    2. Right kidney stone    3. Chronic anticoagulation    4. Recurrent UTI        Plan:       Discussed R ureteroscopic stone removal, laser lithotripsy, R retrograde pyelogram, R ureteral stent      Discussed it may take several procedures to render patient stone free, due to hx of recurrent UTIs will aim to clear all stone that is removable     Discussed importances of post op renal ultrasound to eval for residual stones, hydronephrosis     Discussed importance of post op stone prevention     Urine culture obtained, discussed procedure may be postponed for treatment of infection prior to intervention to decrease risk for sepsis/infection     Date selected  10/3/2024    Hold blood thinners prior to procedure plavix 5 days, asa 7 days    Cardiologist, Dr. Dial to be notified and clearance letter requested

## 2024-08-27 NOTE — H&P (VIEW-ONLY)
Patient ID: Kylah Deal is a 74 y.o. female.    Chief Complaint: r Renal stone    Referral: No referring provider defined for this encounter.     HPI  74 y.o. who presents to the Urology clinic for evaluation of recurrent UTIs, found to have R renal stones, here to discuss management. Patient denies R flank pain, nausea, vomiting. Hx of MI.     Medically Necessary ROS documented in HPI  Review of Systems   Constitutional:  Negative for chills and fever.   HENT:  Negative for congestion and sore throat.    Eyes:  Negative for blurred vision and redness.   Respiratory:  Negative for cough and shortness of breath.    Cardiovascular:  Negative for chest pain and leg swelling.   Gastrointestinal:  Negative for abdominal pain, blood in stool, constipation, nausea and vomiting.   Genitourinary:  Negative for dysuria, flank pain, frequency, hematuria and urgency.   Musculoskeletal:  Negative for back pain.   Skin:  Negative for rash.   Neurological:  Negative for dizziness and headaches.   Psychiatric/Behavioral:  Negative for depression. The patient is not nervous/anxious.          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 01/11/2024    Grade I diastolic dysfunction 10/27/2021    Gastroesophageal reflux disease without esophagitis 11/29/2018    Essential tremor 10/27/2021    Hyperlipidemia 11/29/2018    Hypothyroidism 06/01/2021    Long term current use of aspirin  10/27/2021    Hypertensive heart disease with heart failure 03/26/2024    Sarcoidosis of lung 06/13/2024    Acute renal failure 06/13/2024    Thrush, oral 06/13/2024     Resolved Ambulatory Problems     Diagnosis Date Noted    Abnormal CT of the chest 03/18/2024    Dry mouth and eyes 04/24/2024     Past Medical History:   Diagnosis Date    CAD (coronary artery disease)     Diabetes mellitus     Hypertension     Migraine     Seizures          Past Surgical History  Past Surgical History:   Procedure Laterality Date    APPENDECTOMY      CHOLECYSTECTOMY      CORONARY ANGIOPLASTY WITH STENT PLACEMENT  2017    CORONARY ARTERY BYPASS GRAFT      ENDOSCOPIC ULTRASOUND OF UPPER GASTROINTESTINAL TRACT N/A 04/03/2024    Procedure: ULTRASOUND, UPPER GI TRACT, ENDOSCOPIC;  Surgeon: Lavell Lomax MD;  Location: [facility] ENDO;  Service: Endoscopy;  Laterality: N/A;  4/1 portal- EUS-guided biopsy of anastacia hepatis mass.    HEEL SPUR EXCISION  1997    HIP FRACTURE SURGERY Left 2018    HYSTERECTOMY      TONSILLECTOMY      URETHRAL SLING  2022    WRIST SURGERY Right 2005       Social History       Medications    Current Outpatient Medications:     albuterol (PROVENTIL/VENTOLIN HFA) 90 mcg/actuation inhaler, Inhale 2 puffs into the lungs every 4 (four) hours as needed., Disp: , Rfl:     aspirin (ECOTRIN) 81 MG EC tablet, Take 81 mg by mouth once daily., Disp: , Rfl:     carvediloL (COREG) 12.5 MG tablet, Take 1 tablet by mouth 2 (two) times daily with meals., Disp: , Rfl:     cholecalciferol, vitamin D3, (VITAMIN D3) 50 mcg (2,000 unit) Cap capsule, Take 1 tablet by mouth., Disp: , Rfl:     clopidogreL (PLAVIX) 75 mg tablet, Take 1 tablet by mouth once daily., Disp: , Rfl:     denosumab (PROLIA) 60 mg/mL Syrg, Inject 60 mg into the skin every 6 (six) months., Disp: , Rfl:     docusate sodium 100 mg capsule, Take 100 mg by mouth 2 (two) times daily as needed for Constipation., Disp: , Rfl:     evolocumab (REPATHA SURECLICK SUBQ), Inject  into the skin., Disp: , Rfl:     ezetimibe (ZETIA) 10 mg tablet, Take 10 mg by mouth once daily., Disp: , Rfl:     fluticasone (VERAMYST) 27.5 mcg/actuation nasal spray, 2 sprays by Nasal route once daily., Disp: , Rfl:     folic acid (FOLVITE) 1 MG tablet, Take 1 tablet (1 mg total) by mouth once daily., Disp: 360 tablet, Rfl: 0    furosemide (LASIX) 20 MG tablet, Take 20 mg by mouth 2 (two) times daily., Disp: , Rfl:     hydrALAZINE (APRESOLINE) 50 MG tablet, Take 1 tablet by mouth 2 (two) times daily., Disp: , Rfl:     insulin aspart U-100 (NOVOLOG) 100 unit/mL (3 mL) InPn pen, Inject 2 Units into the skin as needed., Disp: , Rfl:     levETIRAcetam (KEPPRA) 500 MG Tab, TAKE 1 TABLET(500 MG) BY MOUTH TWICE DAILY, Disp: 180 tablet, Rfl: 1    linaGLIPtin (TRADJENTA) 5 mg Tab tablet, Take 5 mg by mouth once daily., Disp: , Rfl:     lisinopriL (PRINIVIL,ZESTRIL) 40 MG tablet, Take 1 tablet by mouth once daily., Disp: , Rfl:     methotrexate 2.5 MG Tab, Take 1 tablet (2.5 mg total) by mouth every 7 days for 14 days, THEN 2 tablets (5 mg total) every 7 days for 14 days, THEN 3 tablets (7.5 mg total) every 7 days for 14 days, THEN 4 tablets (10 mg total) every 7 days., Disp: 18 tablet, Rfl: 0    montelukast (SINGULAIR) 10 mg tablet, Take 10 mg by mouth., Disp: , Rfl:     nitroGLYCERIN (NITROSTAT) 0.4 MG SL tablet, Place 0.4 mg under the tongue., Disp: , Rfl:     pantoprazole (PROTONIX) 40 MG tablet, Take 40 mg by mouth every morning., Disp: , Rfl:     predniSONE (DELTASONE) 10 MG tablet, Take 1 tablet (10 mg total) by mouth once daily., Disp: 30 tablet, Rfl: 1    solifenacin (VESICARE) 10 MG tablet, Take 1 tablet by mouth once daily., Disp: , Rfl:     Allergies  Review of patient's allergies indicates:   Allergen Reactions    Atorvastatin calcium Other (See Comments)     Pain    Metformin Diarrhea     Diarrhea and cramping    Rosuvastatin Other (See Comments)     pain    Statins-hmg-coa reductase inhibitors         Patient's PMH, FH, Social hx, Medications, allergies reviewed and updated as pertinent to today's visit    Objective:      Physical Exam  Constitutional:       Appearance: She is well-developed.   HENT:      Head: Normocephalic and atraumatic.   Eyes:      Conjunctiva/sclera: Conjunctivae normal.   Pulmonary:      Effort: Pulmonary effort is normal. No respiratory distress.   Abdominal:      General: Abdomen is flat. There is no distension.      Palpations: Abdomen is soft. There is no mass.      Tenderness: There is no abdominal tenderness. There is no right CVA tenderness, left CVA tenderness or guarding.   Skin:     General: Skin is warm.      Findings: No rash.   Neurological:      Mental Status: She is alert and oriented to person, place, and time.   Psychiatric:         Behavior: Behavior normal.             Imaging results: personally reviewed imaging with the following findings 1.3 R renal stone, non obstructing  Assessment:       1. Kidney stone    2. Right kidney stone    3. Chronic anticoagulation    4. Recurrent UTI        Plan:       Discussed R ureteroscopic stone removal, laser lithotripsy, R retrograde pyelogram, R ureteral stent      Discussed it may take several procedures to render patient stone free, due to hx of recurrent UTIs will aim to clear all stone that is removable     Discussed importances of post op renal ultrasound to eval for residual stones, hydronephrosis     Discussed importance of post op stone prevention     Urine culture obtained, discussed procedure may be postponed for treatment of infection prior to intervention to decrease risk for sepsis/infection     Date selected  10/3/2024    Hold blood thinners prior to procedure plavix 5 days, asa 7 days    Cardiologist, Dr. Dial to be notified and clearance letter requested

## 2024-09-06 ENCOUNTER — OFFICE VISIT (OUTPATIENT)
Dept: URGENT CARE | Facility: CLINIC | Age: 74
End: 2024-09-06
Payer: MEDICARE

## 2024-09-06 ENCOUNTER — OFFICE VISIT (OUTPATIENT)
Dept: UROLOGY | Facility: CLINIC | Age: 74
End: 2024-09-06
Payer: MEDICARE

## 2024-09-06 VITALS
BODY MASS INDEX: 19.78 KG/M2 | TEMPERATURE: 98 F | OXYGEN SATURATION: 98 % | HEART RATE: 70 BPM | RESPIRATION RATE: 19 BRPM | HEIGHT: 61 IN | DIASTOLIC BLOOD PRESSURE: 77 MMHG | WEIGHT: 104.75 LBS | SYSTOLIC BLOOD PRESSURE: 140 MMHG

## 2024-09-06 DIAGNOSIS — N20.0 RIGHT KIDNEY STONE: ICD-10-CM

## 2024-09-06 DIAGNOSIS — N20.0 KIDNEY STONE: Primary | ICD-10-CM

## 2024-09-06 DIAGNOSIS — Z79.01 CHRONIC ANTICOAGULATION: ICD-10-CM

## 2024-09-06 DIAGNOSIS — N39.0 RECURRENT UTI: ICD-10-CM

## 2024-09-06 DIAGNOSIS — J06.9 VIRAL URI WITH COUGH: Primary | ICD-10-CM

## 2024-09-06 DIAGNOSIS — R05.9 COUGH IN ADULT PATIENT: ICD-10-CM

## 2024-09-06 LAB
CTP QC/QA: YES
SARS-COV-2 AG RESP QL IA.RAPID: NEGATIVE

## 2024-09-06 PROCEDURE — 99999 PR PBB SHADOW E&M-EST. PATIENT-LVL III: CPT | Mod: PBBFAC,HCNC,, | Performed by: STUDENT IN AN ORGANIZED HEALTH CARE EDUCATION/TRAINING PROGRAM

## 2024-09-06 RX ORDER — INSULIN DEGLUDEC 100 U/ML
3 INJECTION, SOLUTION SUBCUTANEOUS
COMMUNITY
Start: 2024-08-28 | End: 2024-09-27

## 2024-09-06 RX ORDER — CEFAZOLIN SODIUM 2 G/50ML
2 SOLUTION INTRAVENOUS
OUTPATIENT
Start: 2024-09-06

## 2024-09-06 RX ORDER — CETIRIZINE HYDROCHLORIDE 10 MG/1
10 TABLET ORAL DAILY
Qty: 30 TABLET | Refills: 0 | Status: SHIPPED | OUTPATIENT
Start: 2024-09-06 | End: 2024-10-06

## 2024-09-06 RX ORDER — GUAIFENESIN 600 MG/1
1200 TABLET, EXTENDED RELEASE ORAL 2 TIMES DAILY
Qty: 40 TABLET | Refills: 0 | Status: SHIPPED | OUTPATIENT
Start: 2024-09-06 | End: 2024-09-16

## 2024-09-06 RX ORDER — AZELASTINE 1 MG/ML
1 SPRAY, METERED NASAL 2 TIMES DAILY
Qty: 30 ML | Refills: 0 | Status: SHIPPED | OUTPATIENT
Start: 2024-09-06 | End: 2025-09-06

## 2024-09-06 NOTE — PATIENT INSTRUCTIONS
General Discharge Instructions   PLEASE READ YOUR DISCHARGE INSTRUCTIONS ENTIRELY AS IT CONTAINS IMPORTANT INFORMATION.  If you were prescribed a narcotic or controlled medication, do not drive or operate heavy equipment or machinery while taking these medications.  If you were prescribed antibiotics, please take them to completion.  You must understand that you've received an Urgent Care treatment only and that you may be released before all your medical problems are known or treated. You, the patient, will arrange for follow up care as instructed.    OVER THE COUNTER RECOMMENDATIONS/SUGGESTIONS.    Make sure to stay well hydrated.    Use Nasal Saline to mechanically move any post nasal drip from your eustachian tube or from the back of your throat.    Use warm salt water gargles to ease your throat pain. Warm salt water gargles as needed for sore throat- 1/2 tsp salt to 1 cup warm water, gargle as desired.    Use an antihistamine such as Claritin, Zyrtec or Allegra to dry you out.    Use pseudoephedrine (behind the counter) to decongest. Pseudoephedrine 30 mg up to 240 mg /day. It can raise your blood pressure and give you palpitations.    Use mucinex (guaifenesin) to break up mucous up to 2400mg/day to loosen any mucous.    The mucinex DM pill has a cough suppressant that can be sedating. It can be used at night to stop the tickle at the back of your throat.    You can use Mucinex D (it has guaifenesin and a high dose of pseudoephedrine) in the mornings to help decongest.    Use Afrin in each nare for no longer than 3 days, as it is addictive. It can also dry out your mucous membranes and cause elevated blood pressure. This is especially useful if you are flying.    Use Flonase 1-2 sprays/nostril per day. It is a local acting steroid nasal spray, if you develop a bloody nose, stop using the medication immediately.    Sometimes Nyquil at night is beneficial to help you get some rest, however it is sedating and it  does have an antihistamine, and tylenol.    Honey is a natural cough suppressant that can be used.    Tylenol up to 4,000 mg a day is safe for short periods and can be used for body aches, pain, and fever. However in high doses and prolonged use it can cause liver irritation.    Ibuprofen is a non-steroidal anti-inflammatory that can be used for body aches, pain, and fever.However it can also cause stomach irritation if over used.     Follow up with your PCP or specialty clinic as instructed in the next 2-3 days if not improved or as needed. You can call [phone] to schedule an appointment with appropriate provider.      If you condition worsens, we recommend that you receive another evaluation at the emergency room immediately or contact your primary medical clinic's after hours call service to discuss your concerns.      Please return here or go to the Emergency Department for any concerns or worsening condition.   You can also call [phone] to schedule an appointment with the appropriate provider.    Please return here or go to the Emergency Department for any concerns or worsening of condition.    Thank you for choosing [facility]!    Our goal in the Urgent Care is to always provide outstanding medical care. You may receive a survey by mail or e-mail in the next week regarding your experience today. We would greatly appreciate you completing and returning the survey. Your feedback provides us with a way to recognize our staff who provide very good care, and it helps us learn how to improve when your experience was below our aspiration of excellence.      We appreciate you trusting us with your medical care. We hope you feel better soon. We will be happy to take care of you for all of your future medical needs.    Sincerely,    HAILEE Franklin  Patient Instructions   PLEASE READ YOUR DISCHARGE INSTRUCTIONS ENTIRELY AS IT CONTAINS IMPORTANT INFORMATION.        Please drink plenty of  "fluids. You body needs increased water but other beverages may aid in comfort.  You will know that you have had enough water to be hydrated when your urine is clear or at least a very pale yellow. Nasal saline may help with removal of mucus as well.  Ibuprofen is preferred for aches and pains as well as fever reduction. If you do not have high blood pressure, then you may use a decongestant such as pseudoephedrine or one of the above medications that have the letter, "-D" following it.  Hot tea with honey can help with sore throats as the heat with reduce the inflammation and the honey will coat your throat to help it feel better. Prescription pain medicine should be used for the significant throat sxs you are experiencing. Do not drive after taking this medication.     Lastly, good hand washing and cough hygiene (cough into your elbow) will help prevent the spread of the illness.  A general rule is that you are no longer contagious once you have been without a fever for over 24 hours without requiring fever reducing medications.   Please get plenty of rest.     Please return here or go to the Emergency Department for any concerns or worsening of condition.     Please take an over the counter antihistamine medication (allegra/Claritin/Zyrtec) of your choice as directed, they may help with some of the runny nose symptoms if you are having them.       Can continue mucinex. Use mucinex (guaifenisin) to break up mucous up to 2400mg/day to loosen any mucous. The mucinex DM pill has a cough suppressant that can be used at night to stop the tickle at the back of your throat. You may use Mucinex to help thin thick secretions to allow you to expel them but it only works if you drink more water.     If not allergic, please take over the counter Tylenol (Acetaminophen) and/or Motrin (Ibuprofen) as directed for control of pain and/or fever.  Please follow up with your primary care doctor or specialist as needed.     Sore throat " recommendations: Warm fluids, warm salt water gargles, throat lozenges, tea, honey, soup, rest, hydration.     Use over the counter flonase: one spray each nostril twice daily OR two sprays each nostril once daily.      Sinus rinses DO NOT USE TAP WATER, if you must, water must be a rolling boil for 1 minute, let it cool, then use.  May use distilled water, or over the counter nasal saline rinses.  Vics vapor rub in shower to help open nasal passages.  May use nasal gel to keep passages moisturized.  May use Nasal saline sprays during the day for added relief of congestion.   For those who go to the gym, please do not use the sauna or steam room now to clear sinuses.     If you  smoke, please stop smoking.        Please return or see your primary care doctor if you develop new or worsening symptoms.      Please arrange follow up with your primary medical clinic as soon as possible. You must understand that you've received an Urgent Care treatment only and that you may be released before all of your medical problems are known or treated. You, the patient, will arrange for follow up as instructed. If your symptoms worsen or fail to improve you should go to the Emergency Room.

## 2024-09-06 NOTE — PROGRESS NOTES
"Subjective:      Patient ID: Kylah Deal is a 74 y.o. female.    Vitals:  height is 5' 1" (1.549 m) and weight is 47.5 kg (104 lb 11.5 oz). Her oral temperature is 97.9 °F (36.6 °C). Her blood pressure is 140/77 (abnormal) and her pulse is 70. Her respiration is 19 and oxygen saturation is 98%.     Chief Complaint: Cough (Nasal congestion, sore throat, raspy voice - Entered by patient)    Pt is here for cough mainly at night , congestion, horse voice which started Tuesdays. Pt took claritin, cough drops last dose yesterday. Pt is being treated for Sarcoidosis with 10 mg of prednisone.  Provider note begins below:  Pt with hx of insulin dependent diabetes sees Dr. Jose, she takes pred 10 mg and MTX daily for sarcoidosis, she just completed keflex for UTI. She says on Tuesday  started with congestion, pnd, hoarse voice, scratchy throat progressed, nasal congestion, and coughing. No fever or chills. No cp or SOB. No GI related symptoms, including, N/v/D or constipation. No anosmia or ageusia.      Cough  This is a new problem. The current episode started in the past 7 days. The problem has been gradually worsening. The problem occurs every few minutes. The cough is Productive of sputum. Associated symptoms include nasal congestion and postnasal drip. Pertinent negatives include no chest pain, chills, ear congestion, ear pain, fever, headaches, heartburn, hemoptysis, myalgias, rash, rhinorrhea, sore throat, shortness of breath, sweats, weight loss or wheezing. Associated symptoms comments: Horse voice. Nothing aggravates the symptoms. Treatments tried: claritin. The treatment provided mild relief. There is no history of asthma, bronchiectasis, bronchitis, COPD, emphysema, environmental allergies or pneumonia.       Constitution: Negative for activity change, appetite change, chills, sweating, fatigue and fever.   HENT:  Positive for postnasal drip. Negative for ear pain and sore throat.    Cardiovascular:  Negative " for chest pain.   Respiratory:  Positive for cough. Negative for bloody sputum, shortness of breath and wheezing.    Gastrointestinal:  Negative for heartburn.   Musculoskeletal:  Negative for muscle ache.   Skin:  Negative for rash.   Allergic/Immunologic: Negative for environmental allergies.   Neurological:  Negative for headaches.      Objective:     Physical Exam   Constitutional: She is oriented to person, place, and time. She appears well-developed.  Non-toxic appearance. She does not appear ill. No distress.   HENT:   Head: Normocephalic and atraumatic.   Ears:   Right Ear: External ear normal.   Left Ear: External ear normal.   Nose: Nose normal.   Mouth/Throat: Oropharynx is clear and moist.   Eyes: Conjunctivae, EOM and lids are normal. Pupils are equal, round, and reactive to light.   Neck: Trachea normal and phonation normal. Neck supple.   Musculoskeletal: Normal range of motion.         General: Normal range of motion.   Neurological: She is alert and oriented to person, place, and time.   Skin: Skin is warm, dry, intact and not diaphoretic.   Psychiatric: Her speech is normal and behavior is normal. Judgment and thought content normal.   Nursing note and vitals reviewed.    Results for orders placed or performed in visit on 09/06/24   SARS Coronavirus 2 Antigen, POCT Manual Read   Result Value Ref Range    SARS Coronavirus 2 Antigen Negative Negative     Acceptable Yes       Assessment:     1. Viral URI with cough    2. Cough in adult patient        Plan:     Lungs ctab, patient has follow-up with pulmonology end of this month, she has a positive sputum culture from July but it sounds like she repeated it in August and that result is still pending.  Encouraged her to follow-up with pulmonology when this results.    Discussed results/diagnosis/plan with patient in clinic. Strict precautions given to patient to monitor for worsening signs and symptoms. Advised to follow up with PCP or  specialist.    Explained side effects of medications prescribed with patient and informed him/her to discontinue use if he/she has any side effects and to inform UC or PCP if this occurs. All questions answered. Strict ED verses clinic return precautions stressed and given in depth. Advised if symptoms worsens of fail to improve he/she should go to the Emergency Room. Discharge and follow-up instructions given verbally/printed with the patient who expressed understanding and willingness to comply with my recommendations. Patient voiced understanding and in agreement with current treatment plan. Patient exits the exam room in no acute distress. Conversant and engaged during discharge discussion, verbalized understanding.      Viral URI with cough  -     guaiFENesin (MUCINEX) 600 mg 12 hr tablet; Take 2 tablets (1,200 mg total) by mouth 2 (two) times daily. for 10 days  Dispense: 40 tablet; Refill: 0  -     cetirizine (ZYRTEC) 10 MG tablet; Take 1 tablet (10 mg total) by mouth once daily.  Dispense: 30 tablet; Refill: 0  -     azelastine (ASTELIN) 137 mcg (0.1 %) nasal spray; 1 spray (137 mcg total) by Nasal route 2 (two) times daily.  Dispense: 30 mL; Refill: 0    Cough in adult patient  -     SARS Coronavirus 2 Antigen, POCT Manual Read          Medical Decision Making:   History:   Old Medical Records: I decided to obtain old medical records.  Old Records Summarized: records from clinic visits, records from another hospital and other records.  Clinical Tests:   Lab Tests: Ordered and Reviewed       <> Summary of Lab: Cv neg

## 2024-09-09 ENCOUNTER — TELEPHONE (OUTPATIENT)
Dept: UROLOGY | Facility: CLINIC | Age: 74
End: 2024-09-09
Payer: MEDICARE

## 2024-09-09 NOTE — LETTER
Cardiac Clearance Form    PLEASE PROVIDE ALL INFORMATION      Date : 2024  Dr. Bonner ,    Please provide us with WRITTEN Cardiac Clearance on Mr/ Mrs/ Ms.   Kylah Deal.  : 1950.        Please send any test results such as : EKG, Holter Monitor, Echocardiogram and or Stress tests.      Patient will be scheduled for a procedure under General/MAC anesthesia scheduled for 10/03/2024.      PLEASE FAX THIS CLEARANCE WITH TEST RESULTS -[phone]  Thank you for your help in this matter.    Sincerely,  Geno Lagunas  Phone- [phone]  Fax- [phone]
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
## 2024-09-09 NOTE — TELEPHONE ENCOUNTER
----- Message from Annamarie Lake MD sent at 9/6/2024  5:09 PM CDT -----  Regarding: cardiology clearance=  Please fax cardiology clearance letter to patient's cardiologist Dr. Bonner @ [phone] (Fax)   Procedure is 10/3

## 2024-09-10 ENCOUNTER — OFFICE VISIT (OUTPATIENT)
Dept: NEUROLOGY | Facility: CLINIC | Age: 74
End: 2024-09-10
Payer: MEDICARE

## 2024-09-10 DIAGNOSIS — G40.909 SEIZURE DISORDER: ICD-10-CM

## 2024-09-10 RX ORDER — LEVETIRACETAM 500 MG/1
500 TABLET ORAL 2 TIMES DAILY
Qty: 180 TABLET | Refills: 3 | Status: SHIPPED | OUTPATIENT
Start: 2024-09-10

## 2024-09-10 NOTE — PATIENT INSTRUCTIONS
VISIT FOLLOW UP    It was nice to see you today.  Here is what we discussed at your visit:  You were seen today for atypical seizures vs migraines.  You are doing well on Keppra so we will continue this medication.  We will plan on having a follow up in 1 year unless you have any questions or concerns.     Dr. FIERRO's contact information: office phone [phone], or contact via [employer]    Seizure precautions:    For emergencies, please call 911 or proceed directly to the nearest emergency room only if you can safely do so.    Seizures may happen at any time. It is important to take certain precautions to maintain your safety.     You should not drive unless you have been cleared by your physicians as well as the [facility].     When possible, take showers instead of baths, as it is possible to drown in even shallow water during a seizure. Do not swim unsupervised or in open water where rescue could be difficult. Do not climb to heights and do not operate heavy machinery. When cooking, use the back burners of the stove and avoid open flames or hot stove tops. Avoid any activities which could be dangerous in the event of a loss of consciousness.

## 2024-09-13 ENCOUNTER — APPOINTMENT (OUTPATIENT)
Dept: LAB | Facility: HOSPITAL | Age: 74
End: 2024-09-13
Attending: INTERNAL MEDICINE
Payer: MEDICARE

## 2024-09-13 DIAGNOSIS — D86.0 PULMONARY SARCOIDOSIS: Primary | ICD-10-CM

## 2024-09-13 PROCEDURE — 87206 SMEAR FLUORESCENT/ACID STAI: CPT | Mod: HCNC | Performed by: INTERNAL MEDICINE

## 2024-09-13 PROCEDURE — 87015 SPECIMEN INFECT AGNT CONCNTJ: CPT | Mod: HCNC | Performed by: INTERNAL MEDICINE

## 2024-09-13 PROCEDURE — 87116 MYCOBACTERIA CULTURE: CPT | Mod: HCNC | Performed by: INTERNAL MEDICINE

## 2024-09-16 PROBLEM — N17.9 ACUTE RENAL FAILURE: Status: RESOLVED | Noted: 2024-06-13 | Resolved: 2024-09-16

## 2024-09-16 LAB
ACID FAST MOD KINY STN SPEC: NORMAL
MYCOBACTERIUM SPEC QL CULT: NORMAL

## 2024-09-25 ENCOUNTER — ANESTHESIA EVENT (OUTPATIENT)
Dept: SURGERY | Facility: HOSPITAL | Age: 74
End: 2024-09-25
Payer: MEDICARE

## 2024-09-25 ENCOUNTER — HOSPITAL ENCOUNTER (OUTPATIENT)
Dept: PREADMISSION TESTING | Facility: HOSPITAL | Age: 74
Discharge: HOME | End: 2024-09-25
Attending: STUDENT IN AN ORGANIZED HEALTH CARE EDUCATION/TRAINING PROGRAM
Payer: MEDICARE

## 2024-09-25 VITALS
RESPIRATION RATE: 18 BRPM | HEIGHT: 61 IN | OXYGEN SATURATION: 100 % | DIASTOLIC BLOOD PRESSURE: 72 MMHG | SYSTOLIC BLOOD PRESSURE: 123 MMHG | TEMPERATURE: 97 F | WEIGHT: 107.69 LBS | HEART RATE: 71 BPM | BODY MASS INDEX: 20.33 KG/M2

## 2024-09-25 DIAGNOSIS — N20.0 KIDNEY STONE: ICD-10-CM

## 2024-09-25 DIAGNOSIS — N20.0 RIGHT KIDNEY STONE: ICD-10-CM

## 2024-09-25 LAB
ANION GAP SERPL CALC-SCNC: 8 MMOL/L (ref 8–16)
BASOPHILS # BLD AUTO: 0.01 K/UL (ref 0–0.2)
BASOPHILS NFR BLD: 0.1 % (ref 0–1.9)
BUN SERPL-MCNC: 25 MG/DL (ref 8–23)
CALCIUM SERPL-MCNC: 10.1 MG/DL (ref 8.7–10.5)
CHLORIDE SERPL-SCNC: 104 MMOL/L (ref 95–110)
CO2 SERPL-SCNC: 27 MMOL/L (ref 23–29)
CREAT SERPL-MCNC: 1.2 MG/DL (ref 0.5–1.4)
DIFFERENTIAL METHOD BLD: ABNORMAL
EOSINOPHIL # BLD AUTO: 0.1 K/UL (ref 0–0.5)
EOSINOPHIL NFR BLD: 1.7 % (ref 0–8)
ERYTHROCYTE [DISTWIDTH] IN BLOOD BY AUTOMATED COUNT: 14.7 % (ref 11.5–14.5)
EST. GFR  (NO RACE VARIABLE): 47 ML/MIN/1.73 M^2
GLUCOSE SERPL-MCNC: 157 MG/DL (ref 70–110)
HCT VFR BLD AUTO: 30.3 % (ref 37–48.5)
HGB BLD-MCNC: 9.8 G/DL (ref 12–16)
IMM GRANULOCYTES # BLD AUTO: 0.03 K/UL (ref 0–0.04)
IMM GRANULOCYTES NFR BLD AUTO: 0.4 % (ref 0–0.5)
LYMPHOCYTES # BLD AUTO: 0.9 K/UL (ref 1–4.8)
LYMPHOCYTES NFR BLD: 13 % (ref 18–48)
MCH RBC QN AUTO: 30.6 PG (ref 27–31)
MCHC RBC AUTO-ENTMCNC: 32.3 G/DL (ref 32–36)
MCV RBC AUTO: 95 FL (ref 82–98)
MONOCYTES # BLD AUTO: 0.8 K/UL (ref 0.3–1)
MONOCYTES NFR BLD: 10.8 % (ref 4–15)
NEUTROPHILS # BLD AUTO: 5.2 K/UL (ref 1.8–7.7)
NEUTROPHILS NFR BLD: 74 % (ref 38–73)
NRBC BLD-RTO: 0 /100 WBC
PLATELET # BLD AUTO: 250 K/UL (ref 150–450)
PMV BLD AUTO: 11.3 FL (ref 9.2–12.9)
POTASSIUM SERPL-SCNC: 4.6 MMOL/L (ref 3.5–5.1)
RBC # BLD AUTO: 3.2 M/UL (ref 4–5.4)
SODIUM SERPL-SCNC: 139 MMOL/L (ref 136–145)
WBC # BLD AUTO: 7.01 K/UL (ref 3.9–12.7)

## 2024-09-25 PROCEDURE — 93010 ELECTROCARDIOGRAM REPORT: CPT | Mod: HCNC,,, | Performed by: INTERNAL MEDICINE

## 2024-09-25 PROCEDURE — 80048 BASIC METABOLIC PNL TOTAL CA: CPT | Mod: HCNC | Performed by: STUDENT IN AN ORGANIZED HEALTH CARE EDUCATION/TRAINING PROGRAM

## 2024-09-25 PROCEDURE — 85025 COMPLETE CBC W/AUTO DIFF WBC: CPT | Mod: HCNC | Performed by: STUDENT IN AN ORGANIZED HEALTH CARE EDUCATION/TRAINING PROGRAM

## 2024-09-25 PROCEDURE — 93005 ELECTROCARDIOGRAM TRACING: CPT | Mod: HCNC

## 2024-09-25 RX ORDER — PREDNISONE 10 MG/1
1 TABLET ORAL DAILY
COMMUNITY

## 2024-09-25 NOTE — DISCHARGE INSTRUCTIONS
YOUR PROCEDURE WILL BE AT [facility] at [address] [place] [address]                  Before 7 AM, enter through the Emergency Entrance..   After 7 AM enter through the Main Entrance.                 Report to the Same Day Surgery Registration Desk in the hallway.(Just beside the Same Day Surgery Unit)      Your procedure  is scheduled for __10/3/2024________.    Call [phone] between 2pm and 5pm on _10/2/2024______to find out your arrival time for the day of surgery.    You may have two visitors.  No children under 12 years old.     You will be going to the Same Day Surgery Unit on the 2nd floor of the hospital.    Important instructions:  Do not eat anything after midnight.  You may have plain water, non carbonated.  You may also have Gatorade or Powerade after midnight.    Stop all fluids 2 hours before your surgery.    It is okay to brush your teeth.  Do not have gum, candy or mints.    SEE MEDICATION SHEET.   TAKE MEDICATIONS AS DIRECTED WITH SIPS OF WATER.      Do not take any diabetic medication on the morning of surgery unless instructed to do so by your doctor or pre op nurse.      All GLP-1 weekly diabetic/weight loss medications must not be taken for one week before your surgery, or your surgery could be canceled.      STOP taking Aspirin, Ibuprofen,  Advil, Motrin, Mobic(meloxicam), Aleve (naproxen), Fish oil, and Vitamin E for at least 7 days before your surgery.     You may take Tylenol if needed which is not a blood thinner.    Please shower the night before and the morning of your surgery.      Contact lenses and removable denture work may not be worn during your procedure.    You may wear deodorant only. If you are having breast surgery, do not wear deodorant on the operative side.    Do not wear powder, body lotion, perfume/cologne or make-up.    Do not wear any jewelry or have any metal on your body.    You will be asked to remove any dentures or  partials for the procedure.    If you are going home on the same day of surgery, you must arrange for a family member or a friend to drive you home.  Public transportation is prohibited.  You will not be able to drive home if you were given anesthesia or sedation.    Patients who want to have their Post-op prescriptions filled from our in-house [facility], bring a Credit/Debit Card or cash with you. A co-pay may be required.  The pharmacy closes at 5:30 pm.    Wear loose fitting clothes allowing for bandages.    Please leave money and valuables home.      You may bring your cell phone.    Call the doctor if fever or illness should occur before your surgery.    Call [phone] to contact us here if needed.                            CLOTHES ON DAY OF SURGERY    SHOULDER surgery:  you must have a very oversized shirt.  Very, Very large.  You will probably have a large sling on with your arm strapped to your chest.  You will not be able to put the arm of the operated shoulder into a sleeve.  You can put the arm of the un-operated shoulder into the sleeve, but the shirt will need to be draped over the operated shoulder.       ARM or HAND surgery:  make sure that your sleeves are large and loose enough to pass over large dressings or cast.      BREAST or UNDERARM surgery:  wear a loose, button down shirt so that you can dress without raising your arms over your head.    ABDOMINAL surgery:  wear loose, comfortable clothing.  Nothing tight around the abdomen.  NO JEANS    PENIS or SCROTAL surgery:  loose comfortable clothing.  Large sweat pants, pajama pants or a robe.  ABSOLUTELY NO JEANS      LEG or FOOT surgery:  wear large loose pants that are able to pass over any large dressings or casts.  You could also wear loose shorts or a skirt.

## 2024-09-25 NOTE — ANESTHESIA PREPROCEDURE EVALUATION
09/25/2024  Kylah Deal is a 74 y.o., female scheduled for REMOVAL, CALCULUS, URETER, URETEROSCOPIC Laser lithotripsy Retrograde pyelogram Ureteral stent placement (Right) - on 10/3/2024.          Past Medical History:   Diagnosis Date    CAD (coronary artery disease)     Diabetes mellitus     Hypertension     Migraine     Seizures        Past Surgical History:   Procedure Laterality Date    APPENDECTOMY      CHOLECYSTECTOMY      CORONARY ANGIOPLASTY WITH STENT PLACEMENT  2017    CORONARY ARTERY BYPASS GRAFT      ENDOSCOPIC ULTRASOUND OF UPPER GASTROINTESTINAL TRACT N/A 04/03/2024    Procedure: ULTRASOUND, UPPER GI TRACT, ENDOSCOPIC;  Surgeon: Lavell Lomax MD;  Location: [facility];  Service: Endoscopy;  Laterality: N/A;  4/1 portal- EUS-guided biopsy of anastacia hepatis mass.    HEEL SPUR EXCISION  1997    HIP FRACTURE SURGERY Left 2018    HYSTERECTOMY      TONSILLECTOMY      URETHRAL SLING  2022    WRIST SURGERY Right 2005           Pre-op Assessment    I have reviewed the Patient Summary Reports.     I have reviewed the Nursing Notes. I have reviewed the NPO Status.   I have reviewed the Medications.     Review of Systems  Anesthesia Hx:  No problems with previous Anesthesia             Denies Family Hx of Anesthesia complications.    Denies Personal Hx of Anesthesia complications.                    Social:  No Alcohol Use, Former Smoker       Hematology/Oncology:  Hematology Normal                                 Oncology Comments: Presumed lymphoma, sees oncologist     EENT/Dental:  EENT/Dental Normal           Cardiovascular:  Exercise tolerance: good   Hypertension   CAD   CABG/stent (x3)         MAHONEY    Functional Capacity good / => 4 METS                         Pulmonary:      Shortness of breath   Sarcoidoisis  Mycobacterium boyd complex               Hepatic/GI:     GERD              Musculoskeletal:  Musculoskeletal Normal                Neurological:       Denies Headaches. Seizures, well controlled                                Endocrine:  Diabetes Hypothyroidism          Dermatological:  Skin Normal    Psych:  Psychiatric Normal                    Physical Exam  General: Well nourished and Cooperative    Airway:  Mallampati: I   Mouth Opening: Normal  TM Distance: Normal  Tongue: Normal  Neck ROM: Normal ROM    Dental:  Intact    Chest/Lungs:  Normal Respiratory Rate    Heart:  Rate: Normal  Rhythm: Regular Rhythm        Anesthesia Plan  Type of Anesthesia, risks & benefits discussed:    Anesthesia Type: Gen ETT  Intra-op Monitoring Plan: Standard ASA Monitors  Post Op Pain Control Plan: multimodal analgesia  Induction:  IV  Airway Plan: Video, Post-Induction  Informed Consent: Informed consent signed with the Patient and all parties understand the risks and agree with anesthesia plan.  All questions answered. Patient consented to blood products? No  ASA Score: 3  Anesthesia Plan Notes: PMHx significant for CABG in 2000, HTN, HLD, diastolic heart failure, CAD, NSTEMI  Followed by Dr. Bonner- last office visit on 8/6- denies any changes since last visit- due for follow up in 6 mos    Ready For Surgery From Anesthesia Perspective.     .

## 2024-09-26 LAB
OHS QRS DURATION: 88 MS
OHS QTC CALCULATION: 403 MS

## 2024-09-27 ENCOUNTER — PATIENT MESSAGE (OUTPATIENT)
Dept: UROLOGY | Facility: CLINIC | Age: 74
End: 2024-09-27
Payer: MEDICARE

## 2024-09-27 ENCOUNTER — OFFICE VISIT (OUTPATIENT)
Dept: PULMONOLOGY | Facility: CLINIC | Age: 74
End: 2024-09-27
Payer: MEDICARE

## 2024-09-27 VITALS
HEIGHT: 61 IN | WEIGHT: 107.38 LBS | DIASTOLIC BLOOD PRESSURE: 76 MMHG | SYSTOLIC BLOOD PRESSURE: 128 MMHG | HEART RATE: 89 BPM | OXYGEN SATURATION: 97 % | BODY MASS INDEX: 20.27 KG/M2

## 2024-09-27 DIAGNOSIS — R05.3 CHRONIC COUGH: ICD-10-CM

## 2024-09-27 DIAGNOSIS — A31.0 MYCOBACTERIUM AVIUM COMPLEX: ICD-10-CM

## 2024-09-27 DIAGNOSIS — D86.0 SARCOIDOSIS OF LUNG: Primary | ICD-10-CM

## 2024-09-27 DIAGNOSIS — J45.909 ASTHMA, UNSPECIFIED ASTHMA SEVERITY, UNSPECIFIED WHETHER COMPLICATED, UNSPECIFIED WHETHER PERSISTENT: ICD-10-CM

## 2024-09-27 PROCEDURE — 99999 PR PBB SHADOW E&M-EST. PATIENT-LVL V: CPT | Mod: PBBFAC,HCNC,, | Performed by: INTERNAL MEDICINE

## 2024-09-27 RX ORDER — ALBUTEROL SULFATE 0.83 MG/ML
2.5 SOLUTION RESPIRATORY (INHALATION) EVERY 6 HOURS PRN
Qty: 120 ML | Refills: 5 | Status: SHIPPED | OUTPATIENT
Start: 2024-09-27 | End: 2025-09-27

## 2024-09-27 RX ORDER — METHOTREXATE 2.5 MG/1
TABLET ORAL
Qty: 82 TABLET | Refills: 0 | Status: SHIPPED | OUTPATIENT
Start: 2024-09-27 | End: 2025-01-09

## 2024-09-27 RX ORDER — CEPHALEXIN 500 MG/1
500 CAPSULE ORAL EVERY 12 HOURS
Qty: 10 CAPSULE | Refills: 0 | Status: SHIPPED | OUTPATIENT
Start: 2024-09-27 | End: 2024-10-02

## 2024-09-27 RX ORDER — SODIUM CHLORIDE FOR INHALATION 3 %
4 VIAL, NEBULIZER (ML) INHALATION EVERY MORNING
Qty: 120 ML | Refills: 3 | Status: SHIPPED | OUTPATIENT
Start: 2024-09-27 | End: 2025-01-25

## 2024-09-27 NOTE — PATIENT INSTRUCTIONS
- increase Methotrexate 12.5 mg weekly for 2 weeks, then to 15 mg weekly going forward  - decrease Prednisone from 10 mg to 5 mg  - be vigilant of glucose level and make adjustments accordingly for insulin  - nebulizer therapy - albuterol followed by hypertonic saline and good cough after  - referral to Dr. Laureen Castellanos - infectious disease  - cbc and cmp 3rd week of October then monthly for 3 months   Mallampati 2

## 2024-09-27 NOTE — PROGRESS NOTES
Subjective:   Patient ID: Kylah Deal is a 74 y.o. female    Chief Complaint:   Chief Complaint   Patient presents with    Sarcoidosis    Cough     HPI  73 yo female with history of probable Sarcoidosis. She is established with pulmonary and previously followed with Dr. Briggs, last visit on June 13, 2024 and by me August 16, 2024. Other PMH includes former smoke (half pack per week quit age 58), CAD and DMII. Her BMI is 19.29, previously on Ozempic.    Today, 9/27/24:  Reports having had URI symptoms 3 weeks ago requiring evaluation at . Was told it was non-allergic rhinitis and given Mucinex. She is starting to feel better from that. She has chronic cough, productive of green phlegm but now clearing. Did experience night sweats twice in the interval since last visit. Dyspnea and fatigue are better. She went to [place] on a vacation and was able to do some hiking. She is taking Prednisone 10 mg daily and Methotrexate 10 mg weekly, which takes on a Monday. Her Ca level was 12.6 on 8/16/24, better since then most recently 10.1 on 9/25/24. She is avoiding Vitamin D and spinach. She is planned for ureteroscopy and stone removal on 10/3/24. Glucose control has been difficult . Typically high after steroids and has periods of hypoglycemia to as low as 40. She is working with endocrinologist to make adjustments accordingly.    8/16/24  Her diagnosis of Sarcoidosis started with abnormally high calcium level on blood work, leading to CT thorax with bilateral perihilar and peribronchial irregular interstitial opacities. She initially underwent bronchoscopy with tbbx at outside hospital in January 2024, but negative for granulomatous disease and malignancy. A (mediastinal) lymph node biopsy with performed with IR on March 1, 2024, resulted in non-caseating granuloma. [place] [facility] [place] [facility], with recommendation for further sampling. She then underwent a EUS on 4/3/24 with finding  of enlarged lymph nodes in middle paraesophageal mediastinum, biopsied with finding of rare granulomas.    Her symptoms included shortness of breath with exertion, cough and significant fatigue. She also experienced night sweats. She was started on Prednisone initially at 30 mg at the end of May 2024, which made her feel great, but this led to uncontrolled glycemic index and difficulty with adjusting her novolog. She was seen in a clinic visit on June 13th 2024, at which time she was recommended to change to MTX as steroid sparring therapy - she started at MTX 2.5 mg weekly (first dose on June 17th) and increased to 5 mg on July 1st. She was instructed to wean off steroids from 15 mg every 5 days and completed steroids on July 3rd 2024. Her fatigue recurred during the wean and persisted off steroids and was is instructed to resume steroids on July 18th, 2024 at 10 mg. Currently her Methotrexate is up to maintenance dose of 10 mg weekly since July 29th. She is taking folic acid daily.    Currently, on MTX 10 mg weekly and Prednisone 10 mg, she does feel better, but still experiencing fatigue which is the most cumbersome symptom. Her glucose control is better, but still experiencing fluctuation in glucose levels. She will be seeing endocrinologist next week. She believes a higher dose would make her feel more energy.    She denies any nausea, vomiting, gastrointestinal symptoms. No abdominal pain.     She experiences brain fogginess for an hour after steroids. Appetite is good. Does have some blurry vision. Has not been able to see ophthalmologist since diagnosis of Sarcoidosis.    Of note, her sputum culture from 7/23/24 is positive for Mycobacterium species - still in processing     She follows with oncologist, Dr. Sanchez. Myeloma labs were performed, and found to have monoclonal protein. A bone marrow biopsy was then performed and there is some concern for possible indolent LPL. She will be following with Dr. Sanchez  later in the year.      History:  Former smoker. Stopped in .  1/2ppw age [age]   No known occupational exposures.  Father was a   Dad was a heavy smoker- 3ppd;  of lung cancer  Mother with COPD      Imaging:  CT May 10, 2024:  Retroperitoneum: Retrocrural and bilateral upper abdominal para-aortic adenopathy appears similar to the prior exam with adenopathy extending into the anastacia hepatis.  Vasculature: Severe atherosclerosis of the abdominal aorta and its branches.  Bladder: No evidence of wall thickening.  Reproductive organs: Hysterectomy suspected.  Bones: No suspicious lytic or blastic lesions.  Impression:  Stable appearance of the chest abdomen pelvis with mediastinal, retrocrural, and retroperitoneal confluent adenopathy and extensive Svetlana lymphatic nodular and confluent bilateral lung opacities.  Findings that can be seen in patient's provided diagnosis of sarcoidosis and correlation with tissue diagnosis, if not already performed is suggested.  Lymphoproliferative disorder could conceivably have a similar appearance but thought less likely     It is difficult to measure discrete lesions according to recist criteria.     Stable mild intrahepatic biliary dilatation     Severe atherosclerosis     Right nephrolithiasis, proximal right hydroureter and bilateral urothelial wall thickening suggesting chronic inflammation.     Lesion 1: organ (max 2 lesions per organ). current measurement - long axis for mass (minimum baseline 1 cm), short axis for node (minimum baseline 1.5) cm. Series  Image . Prior measurement  cm.     Lesion 2: organ (max 2 lesions per organ). current measurement - long axis for mass (minimum baseline 1 cm), short axis for node (minimum baseline 1.5) cm. Series  Image . Prior measurement  cm.     Lesion 3: organ (max 2 lesions per organ). current measurement - long axis for mass (minimum baseline 1 cm), short axis for node (minimum baseline 1.5) cm. Series  Image . Prior  measurement  cm.     Lesion 4: organ (max 2 lesions per organ). current measurement - long axis for mass (minimum baseline 1 cm), short axis for node (minimum baseline 1.5) cm. Series  Image . Prior measurement  cm.     Lesion 5: organ (max 2 lesions per organ). current measurement - long axis for mass (minimum baseline 1 cm), short axis for node (minimum baseline 1.5) cm. Series  Image . Prior measurement  cm.    Path:  EUS 4/3/24:  1 and 2.  Svetlana-gastric mass, endoscopic ultrasound-guided (EUS) biopsies with pathologist adequacy:   Parts 1 and 2 show bland reactive histiocytic/myofibroblastic tissue with rare granulomas and benign gastrointestinal elements   No evidence of malignancy    4/29/24:        BONE MARROW, LEFT ILIAC CREST (ASPIRATE SMEAR, TOUCH IMPRINT, CLOT SECTION, AND CORE BIOPSY):  -- CELLULAR MARROW WITH TRILINEAGE HEMATOPOIESIS AND MEGAKARYOCYTIC HYPERPLASIA.  -- KAPPA RESTRICTED MONOTYPIC B LYMPHOCYTES (1.2%) AND ATYPICAL PLASMA CELLS (<0.1%) DETECTED BY FLOW CYTOMETRIC ANALYSIS.  -- NO MORPHOLOGIC EVIDENCE OF AMYLOID DEPOSITION.  -- NO GRANULOMA.         Outside facility:  CT Guided Lung Bx  Date: 3/1/24  MEDIASTINAL BIOPSY:   - FOCAL NONCASEATING GRANULOMATOUS INFLAMMATION.       Bronchoscopy  Date: 1/31/24  1. LUNG, RIGHT LOWER LOBE, BRONCHIAL BIOPSY:   - BENIGN LOWER RESPIRATORY TRACT COLUMNAR MUCOSA WITH MINIMAL FOCI OF   SQUAMOUS METAPLASTIC ELEMENTS.   - NEGATIVE FOR CARCINOMA AND GRANULOMATOUS DISEASE.     2. LUNG, LEFT LOWER LOBE, BIOPSY:   - BENIGN BRONCHIAL WALL AND PULMONARY ALVEOLAR PARENCHYMAL TISSUES.   - NO EVIDENCE OF CARCINOMA OR GRANULOMATOUS DISEASE.   - NEGATIVE FOR ACTIVE PNEUMONITIS.     3. LUNG, RIGHT MAINSTEM BRONCHUS, BIOPSY:   - MINIMAL SMALL FRAGMENTS OF BRONCHIAL MUCOSA WITH SQUAMOUS METAPLASIA.   - NO EVIDENCE OF CARCINOMA OR GRANULOMATOUS DISEASE.     1. BRONCHIAL WASHING AND BAL:   - NEGATIVE FOR MALIGNANCY.     2. BRONCHIAL BRUSHINGS AND BAL:   - NEGATIVE FOR  MALIGNANCY.     3. BRONCHIAL BRUSHINGS:   - NEGATIVE FOR MALIGNANCY; SQUAMOUS METAPLASIA AND REACTIVE ATYPIA ARE   PRESENT.       Culture 7/23/24:     Abnormal   MYCOBACTERIUM AVIUM COMPLEX  Further identified by [facility]  Organism     MYCOBACTERIUM AVIUM COMPLEX   Antibiotic                   KATHLEEN (mcg/mL)  Interpretation   ----------------------------------------------------------   Clofazimine                         0.12   Moxifloxacin                           >4       R   Clarithromycin                          4       S   Amikacin (IV)                          32       I   Amikacin (liposomal, inhaled)          32       S   Linezolid                              32       R    ACINETOBACTER BAUMANNII/HAEMOLYTICUS     Culture 8/21/24:  MYCOBACTERIUM SPECIES   Identification and susceptibility pending     Culture 9/13/24:  Culture positive, identification pending   Objective:   Vitals reviewed   Physical Exam  Vitals reviewed.   Constitutional:       Appearance: Normal appearance.   HENT:      Head: Normocephalic.   Cardiovascular:      Rate and Rhythm: Normal rate and regular rhythm.   Pulmonary:      Effort: Pulmonary effort is normal.   Musculoskeletal:         General: No swelling.   Neurological:      General: No focal deficit present.      Mental Status: She is alert and oriented to person, place, and time.   Psychiatric:         Mood and Affect: Mood normal.         Behavior: Behavior normal.         Assessment:     Problem List Items Addressed This Visit          Pulmonary    Sarcoidosis of lung - Primary    Relevant Medications    methotrexate 2.5 MG Tab    Other Relevant Orders    CBC auto differential    Comprehensive Metabolic Panel    Ambulatory referral/consult to Infectious Disease    NEBULIZER FOR HOME USE    NEBULIZER KIT (SUPPLIES) FOR HOME USE     Other Visit Diagnoses       Mycobacterium avium complex        Chronic cough        Relevant Orders    NEBULIZER FOR HOME USE     NEBULIZER KIT (SUPPLIES) FOR HOME USE    Asthma, unspecified asthma severity, unspecified whether complicated, unspecified whether persistent        Relevant Orders    NEBULIZER FOR HOME USE    NEBULIZER KIT (SUPPLIES) FOR HOME USE                Plan:       1. Sarcoidosis of lung  Non-caseating granulomas on biopsies lymph node biopsies (IR March 2024; EUS April 2024). No pathology currently evident for malignancy. She is following with oncology for possible LPL.  Radiologically, stage III  - symptoms (particularly fatigue) significantly improved after initiation of steroid and recurred went wean off while titrating up Methotrexate. Of note, she was weaned off steroids while she was still on only 5 mg of MTX  - currently on Prednisone 10 mg daily and MTX 10mg qweekly (Mondays)  Plan:  - increase MTX by 2.5 mg every 2 weeks. Currently on MTX 10 mg, increase to 12.5 mg next Monday for 2 weeks, then increase to 15 mg  - wean steroid to 5 mg daily for 2 weeks, then down to 2.5 mg daily for 2 weeks  - discussed benefit of steroid, but potential side effects including hyperglycemia, confusion, risk of infections and now positive AFB for mycobacterium species (still preliminary)  - continue folic acid daily  - discussed precautions for infection  - check cbc/cmp q1 month for next 3 months      - Quantiferon - negative (6/13/24)  - eye exam - patient will see ophthalmologist later in 2024  - EKG - patient follows with cardiology. Hx of CAD post CABG 2019.   - PFT - June 2024 - normal hetal, mild restriction, dlco moderately reduced  - Immunization: PPSV 24 2021; t-dap 2020      2. 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 Castellanos  - nebulizer ordered, albuterol and hypertonic saline ordered - 2x daily with cough to follow for airway clearance      Follow up 3 - 4 months         40 minutes of total time spent on the encounter, which includes face to face time and non-face to face  time preparing to see the patient (eg, review of tests), Obtaining and/or reviewing separately obtained history, Documenting clinical information in the electronic or other health record, Independently interpreting results (not separately reported) and communicating results to the patient/family/caregiver, or Care coordination (not separately reported).

## 2024-10-03 ENCOUNTER — ANESTHESIA (OUTPATIENT)
Dept: SURGERY | Facility: HOSPITAL | Age: 74
End: 2024-10-03
Payer: MEDICARE

## 2024-10-03 ENCOUNTER — HOSPITAL ENCOUNTER (OUTPATIENT)
Facility: HOSPITAL | Age: 74
Discharge: HOME | End: 2024-10-03
Attending: STUDENT IN AN ORGANIZED HEALTH CARE EDUCATION/TRAINING PROGRAM | Admitting: STUDENT IN AN ORGANIZED HEALTH CARE EDUCATION/TRAINING PROGRAM
Payer: MEDICARE

## 2024-10-03 VITALS
SYSTOLIC BLOOD PRESSURE: 158 MMHG | TEMPERATURE: 98 F | HEART RATE: 64 BPM | OXYGEN SATURATION: 100 % | DIASTOLIC BLOOD PRESSURE: 68 MMHG | RESPIRATION RATE: 18 BRPM

## 2024-10-03 DIAGNOSIS — N20.0 RIGHT KIDNEY STONE: Primary | ICD-10-CM

## 2024-10-03 DIAGNOSIS — E11.9 TYPE 2 DIABETES MELLITUS WITHOUT COMPLICATION, WITHOUT LONG-TERM CURRENT USE OF INSULIN: ICD-10-CM

## 2024-10-03 DIAGNOSIS — N20.0 KIDNEY STONE: ICD-10-CM

## 2024-10-03 LAB
POCT GLUCOSE: 129 MG/DL (ref 70–110)
POCT GLUCOSE: 149 MG/DL (ref 70–110)

## 2024-10-03 PROCEDURE — 63600175 PHARM REV CODE 636 W HCPCS: Mod: HCNC | Performed by: STUDENT IN AN ORGANIZED HEALTH CARE EDUCATION/TRAINING PROGRAM

## 2024-10-03 PROCEDURE — 63600175 PHARM REV CODE 636 W HCPCS: Mod: HCNC | Performed by: NURSE ANESTHETIST, CERTIFIED REGISTERED

## 2024-10-03 PROCEDURE — 27201423 OPTIME MED/SURG SUP & DEVICES STERILE SUPPLY: Mod: HCNC | Performed by: STUDENT IN AN ORGANIZED HEALTH CARE EDUCATION/TRAINING PROGRAM

## 2024-10-03 PROCEDURE — 71000015 HC POSTOP RECOV 1ST HR: Mod: HCNC | Performed by: STUDENT IN AN ORGANIZED HEALTH CARE EDUCATION/TRAINING PROGRAM

## 2024-10-03 PROCEDURE — 37000008 HC ANESTHESIA 1ST 15 MINUTES: Mod: HCNC | Performed by: STUDENT IN AN ORGANIZED HEALTH CARE EDUCATION/TRAINING PROGRAM

## 2024-10-03 PROCEDURE — C2617 STENT, NON-COR, TEM W/O DEL: HCPCS | Mod: HCNC | Performed by: STUDENT IN AN ORGANIZED HEALTH CARE EDUCATION/TRAINING PROGRAM

## 2024-10-03 PROCEDURE — 25500020 PHARM REV CODE 255: Mod: HCNC | Performed by: STUDENT IN AN ORGANIZED HEALTH CARE EDUCATION/TRAINING PROGRAM

## 2024-10-03 PROCEDURE — 25000003 PHARM REV CODE 250: Mod: HCNC | Performed by: NURSE ANESTHETIST, CERTIFIED REGISTERED

## 2024-10-03 PROCEDURE — 36000706: Mod: HCNC | Performed by: STUDENT IN AN ORGANIZED HEALTH CARE EDUCATION/TRAINING PROGRAM

## 2024-10-03 PROCEDURE — C1758 CATHETER, URETERAL: HCPCS | Mod: HCNC | Performed by: STUDENT IN AN ORGANIZED HEALTH CARE EDUCATION/TRAINING PROGRAM

## 2024-10-03 PROCEDURE — 25000003 PHARM REV CODE 250: Mod: HCNC | Performed by: STUDENT IN AN ORGANIZED HEALTH CARE EDUCATION/TRAINING PROGRAM

## 2024-10-03 PROCEDURE — 74420 UROGRAPHY RTRGR +-KUB: CPT | Mod: 26,,, | Performed by: STUDENT IN AN ORGANIZED HEALTH CARE EDUCATION/TRAINING PROGRAM

## 2024-10-03 PROCEDURE — C1769 GUIDE WIRE: HCPCS | Mod: HCNC | Performed by: STUDENT IN AN ORGANIZED HEALTH CARE EDUCATION/TRAINING PROGRAM

## 2024-10-03 PROCEDURE — 71000016 HC POSTOP RECOV ADDL HR: Mod: HCNC | Performed by: STUDENT IN AN ORGANIZED HEALTH CARE EDUCATION/TRAINING PROGRAM

## 2024-10-03 PROCEDURE — 37000009 HC ANESTHESIA EA ADD 15 MINS: Mod: HCNC | Performed by: STUDENT IN AN ORGANIZED HEALTH CARE EDUCATION/TRAINING PROGRAM

## 2024-10-03 PROCEDURE — 52356 CYSTO/URETERO W/LITHOTRIPSY: CPT | Mod: RT,,, | Performed by: STUDENT IN AN ORGANIZED HEALTH CARE EDUCATION/TRAINING PROGRAM

## 2024-10-03 PROCEDURE — 36000707: Mod: HCNC | Performed by: STUDENT IN AN ORGANIZED HEALTH CARE EDUCATION/TRAINING PROGRAM

## 2024-10-03 PROCEDURE — 25000003 PHARM REV CODE 250: Mod: HCNC | Performed by: ANESTHESIOLOGY

## 2024-10-03 PROCEDURE — 82365 CALCULUS SPECTROSCOPY: CPT | Mod: HCNC | Performed by: STUDENT IN AN ORGANIZED HEALTH CARE EDUCATION/TRAINING PROGRAM

## 2024-10-03 PROCEDURE — 63600175 PHARM REV CODE 636 W HCPCS: Mod: HCNC | Performed by: ANESTHESIOLOGY

## 2024-10-03 PROCEDURE — C1894 INTRO/SHEATH, NON-LASER: HCPCS | Mod: HCNC | Performed by: STUDENT IN AN ORGANIZED HEALTH CARE EDUCATION/TRAINING PROGRAM

## 2024-10-03 PROCEDURE — 71000033 HC RECOVERY, INTIAL HOUR: Mod: HCNC | Performed by: STUDENT IN AN ORGANIZED HEALTH CARE EDUCATION/TRAINING PROGRAM

## 2024-10-03 DEVICE — URETERAL STENT
Type: IMPLANTABLE DEVICE | Site: URETER | Status: FUNCTIONAL
Brand: PERCUFLEX™ PLUS

## 2024-10-03 RX ORDER — SODIUM CHLORIDE 0.9 % (FLUSH) 0.9 %
10 SYRINGE (ML) INJECTION
Status: DISCONTINUED | OUTPATIENT
Start: 2024-10-03 | End: 2024-10-03 | Stop reason: HOSPADM

## 2024-10-03 RX ORDER — PROPOFOL 10 MG/ML
VIAL (ML) INTRAVENOUS
Status: DISCONTINUED | OUTPATIENT
Start: 2024-10-03 | End: 2024-10-03

## 2024-10-03 RX ORDER — GLUCAGON 1 MG
1 KIT INJECTION
Status: DISCONTINUED | OUTPATIENT
Start: 2024-10-03 | End: 2024-10-03 | Stop reason: HOSPADM

## 2024-10-03 RX ORDER — ONDANSETRON HYDROCHLORIDE 2 MG/ML
4 INJECTION, SOLUTION INTRAVENOUS DAILY PRN
Status: DISCONTINUED | OUTPATIENT
Start: 2024-10-03 | End: 2024-10-03 | Stop reason: HOSPADM

## 2024-10-03 RX ORDER — ROCURONIUM BROMIDE 10 MG/ML
INJECTION, SOLUTION INTRAVENOUS
Status: DISCONTINUED | OUTPATIENT
Start: 2024-10-03 | End: 2024-10-03

## 2024-10-03 RX ORDER — FENTANYL CITRATE 50 UG/ML
INJECTION, SOLUTION INTRAMUSCULAR; INTRAVENOUS
Status: DISCONTINUED | OUTPATIENT
Start: 2024-10-03 | End: 2024-10-03

## 2024-10-03 RX ORDER — OXYCODONE HYDROCHLORIDE 5 MG/1
5 TABLET ORAL EVERY 8 HOURS PRN
Qty: 10 TABLET | Refills: 0 | Status: SHIPPED | OUTPATIENT
Start: 2024-10-03

## 2024-10-03 RX ORDER — HYDROMORPHONE HYDROCHLORIDE 2 MG/ML
0.2 INJECTION, SOLUTION INTRAMUSCULAR; INTRAVENOUS; SUBCUTANEOUS EVERY 5 MIN PRN
Status: DISCONTINUED | OUTPATIENT
Start: 2024-10-03 | End: 2024-10-03 | Stop reason: HOSPADM

## 2024-10-03 RX ORDER — POLYETHYLENE GLYCOL 3350 17 G/17G
17 POWDER, FOR SOLUTION ORAL DAILY
Qty: 7 EACH | Refills: 0 | Status: SHIPPED | OUTPATIENT
Start: 2024-10-03 | End: 2024-10-10

## 2024-10-03 RX ORDER — ACETAMINOPHEN 10 MG/ML
1000 INJECTION, SOLUTION INTRAVENOUS ONCE
Status: DISCONTINUED | OUTPATIENT
Start: 2024-10-03 | End: 2024-10-03 | Stop reason: HOSPADM

## 2024-10-03 RX ORDER — PHENYLEPHRINE HYDROCHLORIDE 10 MG/ML
INJECTION INTRAVENOUS
Status: DISCONTINUED | OUTPATIENT
Start: 2024-10-03 | End: 2024-10-03

## 2024-10-03 RX ORDER — LIDOCAINE HYDROCHLORIDE 20 MG/ML
INJECTION INTRAVENOUS
Status: DISCONTINUED | OUTPATIENT
Start: 2024-10-03 | End: 2024-10-03

## 2024-10-03 RX ORDER — SODIUM CHLORIDE, SODIUM LACTATE, POTASSIUM CHLORIDE, CALCIUM CHLORIDE 600; 310; 30; 20 MG/100ML; MG/100ML; MG/100ML; MG/100ML
INJECTION, SOLUTION INTRAVENOUS CONTINUOUS
Status: DISCONTINUED | OUTPATIENT
Start: 2024-10-03 | End: 2024-10-03 | Stop reason: HOSPADM

## 2024-10-03 RX ORDER — CEFDINIR 300 MG/1
300 CAPSULE ORAL EVERY 12 HOURS
Qty: 6 CAPSULE | Refills: 0 | Status: SHIPPED | OUTPATIENT
Start: 2024-10-03 | End: 2024-10-06

## 2024-10-03 RX ORDER — FENTANYL CITRATE 50 UG/ML
25 INJECTION, SOLUTION INTRAMUSCULAR; INTRAVENOUS EVERY 5 MIN PRN
Status: DISCONTINUED | OUTPATIENT
Start: 2024-10-03 | End: 2024-10-03 | Stop reason: HOSPADM

## 2024-10-03 RX ORDER — ACETAMINOPHEN 500 MG
1000 TABLET ORAL
Status: COMPLETED | OUTPATIENT
Start: 2024-10-03 | End: 2024-10-03

## 2024-10-03 RX ORDER — PHENAZOPYRIDINE HYDROCHLORIDE 100 MG/1
100 TABLET, FILM COATED ORAL ONCE
Status: COMPLETED | OUTPATIENT
Start: 2024-10-03 | End: 2024-10-03

## 2024-10-03 RX ORDER — LIDOCAINE HYDROCHLORIDE 10 MG/ML
1 INJECTION, SOLUTION EPIDURAL; INFILTRATION; INTRACAUDAL; PERINEURAL ONCE
Status: DISCONTINUED | OUTPATIENT
Start: 2024-10-03 | End: 2024-10-03 | Stop reason: HOSPADM

## 2024-10-03 RX ORDER — LIDOCAINE HYDROCHLORIDE 20 MG/ML
JELLY TOPICAL
Status: DISCONTINUED | OUTPATIENT
Start: 2024-10-03 | End: 2024-10-03 | Stop reason: HOSPADM

## 2024-10-03 RX ORDER — ONDANSETRON HYDROCHLORIDE 2 MG/ML
INJECTION, SOLUTION INTRAVENOUS
Status: DISCONTINUED | OUTPATIENT
Start: 2024-10-03 | End: 2024-10-03

## 2024-10-03 RX ORDER — DEXAMETHASONE SODIUM PHOSPHATE 4 MG/ML
INJECTION, SOLUTION INTRA-ARTICULAR; INTRALESIONAL; INTRAMUSCULAR; INTRAVENOUS; SOFT TISSUE
Status: DISCONTINUED | OUTPATIENT
Start: 2024-10-03 | End: 2024-10-03

## 2024-10-03 RX ADMIN — ROCURONIUM BROMIDE 50 MG: 10 INJECTION, SOLUTION INTRAVENOUS at 11:10

## 2024-10-03 RX ADMIN — PHENYLEPHRINE HYDROCHLORIDE 100 MCG: 10 INJECTION INTRAVENOUS at 11:10

## 2024-10-03 RX ADMIN — PHENAZOPYRIDINE HYDROCHLORIDE 100 MG: 100 TABLET ORAL at 02:10

## 2024-10-03 RX ADMIN — FENTANYL CITRATE 50 MCG: 0.05 INJECTION, SOLUTION INTRAMUSCULAR; INTRAVENOUS at 11:10

## 2024-10-03 RX ADMIN — ACETAMINOPHEN 1000 MG: 500 TABLET ORAL at 09:10

## 2024-10-03 RX ADMIN — SODIUM CHLORIDE, SODIUM LACTATE, POTASSIUM CHLORIDE, AND CALCIUM CHLORIDE: .6; .31; .03; .02 INJECTION, SOLUTION INTRAVENOUS at 11:10

## 2024-10-03 RX ADMIN — LIDOCAINE HYDROCHLORIDE 70 MG: 20 INJECTION, SOLUTION INTRAVENOUS at 11:10

## 2024-10-03 RX ADMIN — SUGAMMADEX 200 MG: 100 INJECTION, SOLUTION INTRAVENOUS at 12:10

## 2024-10-03 RX ADMIN — CEFAZOLIN 2 G: 2 INJECTION, POWDER, FOR SOLUTION INTRAMUSCULAR; INTRAVENOUS at 11:10

## 2024-10-03 RX ADMIN — PROPOFOL 100 MG: 10 INJECTION, EMULSION INTRAVENOUS at 11:10

## 2024-10-03 RX ADMIN — DEXAMETHASONE SODIUM PHOSPHATE 4 MG: 4 INJECTION, SOLUTION INTRAMUSCULAR; INTRAVENOUS at 11:10

## 2024-10-03 RX ADMIN — SODIUM CHLORIDE, POTASSIUM CHLORIDE, SODIUM LACTATE AND CALCIUM CHLORIDE: 600; 310; 30; 20 INJECTION, SOLUTION INTRAVENOUS at 09:10

## 2024-10-03 RX ADMIN — PHENYLEPHRINE HYDROCHLORIDE 100 MCG: 10 INJECTION INTRAVENOUS at 12:10

## 2024-10-03 RX ADMIN — ONDANSETRON 4 MG: 2 INJECTION, SOLUTION INTRAMUSCULAR; INTRAVENOUS at 11:10

## 2024-10-03 RX ADMIN — GLYCOPYRROLATE 0.2 MG: 0.2 INJECTION, SOLUTION INTRAMUSCULAR; INTRAVITREAL at 11:10

## 2024-10-03 NOTE — DISCHARGE INSTRUCTIONS
Please remove the stent that was placed intraoperatively on 10/5/2024, anticipate cramping of day of stent removal. Do not sit in bathtub to remove stent due to risk of infection.  The stent is attached to strings, the stent is a soft, slippery, long white noodle like structure. Occasionally the strings may break, please call the office if you believe the stent was not removed successfully.   Avoid known bladder irritants:   Coffee - Regular & Decaf  Tea - caffeinated  Carbonated beverages - cola, non-lay, diet & caffeine-free  Alcohols - Beer, Red Wine, White Wine, Champagne  Fruits - Grapefruit, Lemon, Orange, Pineapple  Fruit Juices - Cranberry, Grapefruit, Orange, Pineapple  Vegetables - Tomato & Tomato Products  Flavor Enhancers - Hot peppers, Spicy foods, Chili, Horseradish, Vinegar, Monosodium glutamate (MSG)  Artificial Sweeteners - NutraSweet, Sweet N Low, Equal (sweetener), Saccharin      Fall Prevention  Millions of people fall every year and injure themselves. You may have had anesthesia or sedation which may increase your risk of falling. You may have health issues that put you at an increased risk of falling.     Here are ways to reduce your risk of falling.    Make your home safe by keeping walkways clear of objects you may trip over.  Use non-slip pads under rugs. Do not use area rugs or small throw rugs.  Use non-slip mats in bathtubs and showers.  Install handrails and lights on staircases.  Do not walk in poorly lit areas.  Do not stand on chairs or wobbly ladders.  Use caution when reaching overhead or looking upward. This position can cause a loss of balance.  Be sure your shoes fit properly, have non-slip bottoms and are in good condition.   Wear shoes both inside and out. Avoid going barefoot or wearing slippers.  Be cautious when going up and down stairs, curbs, and when walking on uneven sidewalks.  If your balance is poor, consider using a cane or walker.  If your fall was related to  alcohol use, stop or limit alcohol intake.   If your fall was related to use of sleeping medicines, talk to your doctor about this. You may need to reduce your dosage at bedtime if you awaken during the night to go to the bathroom.    To reduce the need for nighttime bathroom trips:  Avoid drinking fluids for several hours before going to bed  Empty your bladder before going to bed  Men can keep a urinal at the bedside  Stay as active as you can. Balance, flexibility, strength, and endurance all come from exercise. They all play a role in preventing falls. Ask your healthcare provider which types of activity are right for you.  Get your vision checked on a regular basis.  If you have pets, know where they are before you stand up or walk so you don't trip over them.  Use night lights.      Patient placed on cardiac monitor, automatic blood pressure and continuous pulse ox.

## 2024-10-03 NOTE — BRIEF OP NOTE
[facility] [place]  Brief Operative Note    Surgery Date: 10/3/2024     Surgeons and Role:     * Annamarie Lake MD - Primary    Assisting Surgeon: None    Pre-op Diagnosis:  Kidney stone [N20.0]    Post-op Diagnosis:  Post-Op Diagnosis Codes:     * Kidney stone [N20.0]    Procedure(s) (LRB):  REMOVAL, CALCULUS, URETER, URETEROSCOPIC Laser lithotripsy Retrograde pyelogram Ureteral stent placement (Right)    Anesthesia: General    Operative Findings:   R renal stones fractured with laser, removed through sheath, stent dwell time 48 hrs     Estimated Blood Loss: * No values recorded between 10/3/2024 11:39 AM and 10/3/2024 12:28 PM *         Specimens:   Specimen (24h ago, onward)       Start     Ordered    10/03/24 1158  Specimen to Pathology, Surgery Urology  Once        Comments: Pre-op Diagnosis: Kidney stone [N20.0]Procedure(s):REMOVAL, CALCULUS, URETER, URETEROSCOPIC Laser lithotripsy Retrograde pyelogram Ureteral stent placement Number of specimens: 1Name of specimens:right renal stone     References:    Click here for ordering Quick Tip   Question Answer Comment   Procedure Type: Urology    Specimen Class: Routine/Screening    Release to patient Immediate        10/03/24 1207    Pending  Specimen to Pathology, Surgery Urology  Once        Comments: Pre-op Diagnosis: Kidney stone [N20.0]Procedure(s):REMOVAL, CALCULUS, URETER, URETEROSCOPIC Laser lithotripsy Retrograde pyelogram Ureteral stent placement Number of specimens: 1Name of specimens: RIGHT URETERAL STONE     References:    Click here for ordering Quick Tip   Question Answer Comment   Procedure Type: Urology    Specimen Class: Routine/Screening    Release to patient Immediate        Pending                      Discharge Note    OUTCOME: Patient tolerated treatment/procedure well without complication and is now ready for discharge.    DISPOSITION: Home or Self Care    FINAL DIAGNOSIS:  Right kidney stone    FOLLOWUP: In clinic    DISCHARGE  INSTRUCTIONS:    Discharge Procedure Orders   Diet general     Call MD for:  temperature >100.4     Call MD for:  persistent nausea and vomiting     Call MD for:  severe uncontrolled pain     Call MD for:  difficulty breathing, headache or visual disturbances     Call MD for:   Order Comments: Inability to urinate for longer than 6-8 hours, this is concerning for urinary retention, this will require a trip to the emergency room for a su catheter to be placed     Remove dressing in 48 hours     Activity as tolerated

## 2024-10-03 NOTE — OR NURSING
Client has had 120ml of fluid by mouth since her arrival to [facility].     No IV fluids noted assessment at 1400. Fluid initiated, client is due to void but has no urge to do so presently.     Encouraged to drink liquids, warm broth suggested, offer accepted. Declines solid food.

## 2024-10-03 NOTE — ANESTHESIA PROCEDURE NOTES
Intubation    Date/Time: 10/3/2024 11:20 AM    Performed by: Raul Nash CRNA  Authorized by: Edmond Mccormick MD    Intubation:     Induction:  Intravenous    Intubated:  Postinduction    Mask Ventilation:  Easy with oral airway    Attempts:  1    Attempted By:  Student (Charlotte LOREDO)    Method of Intubation:  Video laryngoscopy    Blade:  Kay 3    Laryngeal View Grade: Grade I - full view of cords      Difficult Airway Encountered?: No      Complications:  None    Airway Device:  Oral endotracheal tube    Airway Device Size:  7.0    Style/Cuff Inflation:  Cuffed (inflated to minimal occlusive pressure)    Tube secured:  22    Secured at:  The lips    Placement Verified By:  Capnometry    Complicating Factors:  None    Findings Post-Intubation:  BS equal bilateral

## 2024-10-03 NOTE — OP NOTE
DATE OF PROCEDURE:  10/3/2024     PREOPERATIVE DIAGNOSIS: Right renalcalculus.     POSTOPERATIVE DIAGNOSIS: Right renalcalculus.     PROCEDURE PERFORMED:    Cystoscopy with Right retrograde pyelogram,   Right ureteroscopy   laser lithotripsy, Rightstone manipulation and extraction  Right ureteral stent placement, Fluoroscopy      PRIMARY SURGEON:  Annamarie Lake MD  Assistant: None     ANESTHESIA:  General.     ESTIMATED BLOOD LOSS:  Minimal.     DRAINS:  A 4.8-Bruneian 24 cm double-J stent with string dwell time 48 hours  .     SPECIMENS REMOVED:  Right Renalstone.      COMPLICATIONS:  None.     INDICATIONS:  74 y.o. patient with a history of right sided renal stone.  sHe was unable to pass the stone. She has history of recurrent UTIs.   sHe is here today for clearance of the stone.      PROCEDURE DETAILS Patient was taken to the Operating Room where She was positively identified by raphael.  sHe was placed supine on the operating room table.    Following induction of adequate general anesthesia, she was placed in the dorsal   lithotomy position and her meatus and genitalia were prepped and draped in the   usual sterile fashion. A preoperative timeout was performed as well as confirmation of preoperative   antibiotics and preoperative marking on the Right side.     A  film was taken using fluoroscopy.     A 22-Bruneian rigid cystoscope was then passed through the urethra into the bladder under direct vision.  There were no urethral lesions, strictures seen.  No bladder lesions seen.  Once into the bladder, the bladder was inspected.  There were no tumors seen.  No lesions seen.  Both ureteral orifices were identified.  They were seen to be in their orthotopic position.  I then used a 5-Bruneian open-ended catheter to intubate the Right ureteral orifice.  Retrograde pyelogram was taken.  There were no filling defects seen  within the ureter. I then passed a 0.035 inch zip wire.  This was passed through the  open-ended catheter up to the level of the kidney.  The open-ended catheter and scope were withdrawn leaving the wire in place. A semi-rigid ureteroscope was advanced alongside the wire into the  right ureteral orifice. The ureter was evaluated for stones, none were visualized.   I advanced a second 0.035 wire, Bentson. I removed the rigid ureteroscope leaving both wires in place. I then advanced an 11/13-Filipino ureteral access sheath over the wire, under live fluoroscopy, passed this up into the mid/proximal ureter.  The obturator and wire were withdrawn leaving the sheath in place.   I then advanced a digital flexible ureteroscope through the sheath up into the ureter.  The ureter was then inspected up to the level of the kidney.  The stones were seen within the in the lower pole, stone was visualized similar to pre operative imaging. I then passed a Holmium laser fiber through the scope.  Using the laser I fragmented the stone into smaller pieces. I then used a ZeroTip Nitinol basket to grasp the stone framents.  This was then brought out through the sheath and was then sent off for stone analysis.  The scope was then passed one last time into the kidney to fully inspect the kidney again no other stones were seen.  The ureter was inspected.  The scope was withdrawn under direct vision along with the scope.  There was no stone seen within the ureter and the ureter showed no injury and no edema.     I then planned to leave a stent with string. I then advanced a 4.8-Filipino 24 cm double-J stent with string over the wire, deploying a coil within the Right kidney and a coil within the bladder. Strings secured with Tegaderm to inner right thigh.  This was confirmed on fluoroscopy.         Anesthesia was then reversed.  sHe was taken to Recovery in room in stable condition.

## 2024-10-03 NOTE — TRANSFER OF CARE
Anesthesia Transfer of Care Note    Patient: Kyalh Deal    Procedure(s) Performed: Procedure(s) (LRB):  REMOVAL, CALCULUS, URETER, URETEROSCOPIC Laser lithotripsy Retrograde pyelogram Ureteral stent placement (Right)    Patient location: PACU    Anesthesia Type: general    Transport from OR: Transported from OR on room air with adequate spontaneous ventilation    Post pain: adequate analgesia    Post assessment: no apparent anesthetic complications and tolerated procedure well    Post vital signs: stable    Level of consciousness: lethargic and responds to stimulation    Nausea/Vomiting: no nausea/vomiting    Complications: none    Transfer of care protocol was followed    Last vitals: Visit Vitals  /63 (BP Location: Left arm, Patient Position: Sitting)   Pulse 65   Temp 36.5 °C (97.7 °F) (Oral)   Resp 16   LMP  (LMP Unknown)   SpO2 100%   Breastfeeding No

## 2024-10-03 NOTE — PLAN OF CARE
Pre-op plan of care reviewed with patient and son.  Admit assessment complete. Questions encouraged and answered. Post-op education begun with pt. Pt ready to proceed.

## 2024-10-04 NOTE — ANESTHESIA POSTPROCEDURE EVALUATION
Anesthesia Post Evaluation    Patient: Kylah Deal    Procedure(s) Performed: Procedure(s) (LRB):  REMOVAL, CALCULUS, URETER, URETEROSCOPIC Laser lithotripsy Retrograde pyelogram Ureteral stent placement (Right)    Final Anesthesia Type: general      Patient location during evaluation: PACU  Patient participation: Yes- Able to Participate  Level of consciousness: awake and alert and oriented  Post-procedure vital signs: reviewed and stable  Pain management: adequate  Airway patency: patent    PONV status at discharge: No PONV  Anesthetic complications: no      Cardiovascular status: blood pressure returned to baseline and hemodynamically stable  Respiratory status: unassisted, spontaneous ventilation and room air  Hydration status: euvolemic  Follow-up not needed.              Vitals Value Taken Time   /68 10/03/24 1649   Temp 36.4 °C (97.5 °F) 10/03/24 1649   Pulse 64 10/03/24 1649   Resp 18 10/03/24 1649   SpO2 100 % 10/03/24 1649         Event Time   Out of Recovery 13:21:00         Pain/Tee Score: Pain Rating Prior to Med Admin: -- (Pyridium given; Dr. Lake had tamir be called in the OR for an order.) (10/3/2024  2:45 PM)  Tee Score: 10 (10/3/2024  4:55 PM)

## 2024-10-08 ENCOUNTER — PATIENT MESSAGE (OUTPATIENT)
Dept: FAMILY MEDICINE | Facility: CLINIC | Age: 74
End: 2024-10-08
Payer: MEDICARE

## 2024-10-09 ENCOUNTER — PATIENT MESSAGE (OUTPATIENT)
Dept: PULMONOLOGY | Facility: CLINIC | Age: 74
End: 2024-10-09
Payer: MEDICARE

## 2024-10-09 DIAGNOSIS — D86.0 SARCOIDOSIS OF LUNG: ICD-10-CM

## 2024-10-09 RX ORDER — METHOTREXATE 2.5 MG/1
15 TABLET ORAL
Qty: 72 TABLET | Refills: 0 | Status: SHIPPED | OUTPATIENT
Start: 2024-10-09 | End: 2025-01-07

## 2024-10-11 LAB
COMPN STONE: NORMAL
LABORATORY COMMENT REPORT: NORMAL
SPECIMEN SOURCE: NORMAL
STONE ANALYSIS IR-IMP: NORMAL

## 2024-10-15 ENCOUNTER — LAB VISIT (OUTPATIENT)
Dept: LAB | Facility: HOSPITAL | Age: 74
End: 2024-10-15
Attending: INTERNAL MEDICINE
Payer: MEDICARE

## 2024-10-15 DIAGNOSIS — D86.0 SARCOIDOSIS OF LUNG: ICD-10-CM

## 2024-10-15 LAB
ALBUMIN SERPL BCP-MCNC: 3.1 G/DL (ref 3.5–5.2)
ALP SERPL-CCNC: 77 U/L (ref 55–135)
ALT SERPL W/O P-5'-P-CCNC: 25 U/L (ref 10–44)
ANION GAP SERPL CALC-SCNC: 9 MMOL/L (ref 8–16)
AST SERPL-CCNC: 18 U/L (ref 10–40)
BASOPHILS # BLD AUTO: 0.03 K/UL (ref 0–0.2)
BASOPHILS NFR BLD: 0.3 % (ref 0–1.9)
BILIRUB SERPL-MCNC: 0.3 MG/DL (ref 0.1–1)
BUN SERPL-MCNC: 25 MG/DL (ref 8–23)
CALCIUM SERPL-MCNC: 9.5 MG/DL (ref 8.7–10.5)
CHLORIDE SERPL-SCNC: 106 MMOL/L (ref 95–110)
CO2 SERPL-SCNC: 25 MMOL/L (ref 23–29)
CREAT SERPL-MCNC: 1.1 MG/DL (ref 0.5–1.4)
DIFFERENTIAL METHOD BLD: ABNORMAL
EOSINOPHIL # BLD AUTO: 0.2 K/UL (ref 0–0.5)
EOSINOPHIL NFR BLD: 2.1 % (ref 0–8)
ERYTHROCYTE [DISTWIDTH] IN BLOOD BY AUTOMATED COUNT: 14.4 % (ref 11.5–14.5)
EST. GFR  (NO RACE VARIABLE): 52 ML/MIN/1.73 M^2
GLUCOSE SERPL-MCNC: 138 MG/DL (ref 70–110)
HCT VFR BLD AUTO: 32.2 % (ref 37–48.5)
HGB BLD-MCNC: 10.1 G/DL (ref 12–16)
IMM GRANULOCYTES # BLD AUTO: 0.06 K/UL (ref 0–0.04)
IMM GRANULOCYTES NFR BLD AUTO: 0.6 % (ref 0–0.5)
LYMPHOCYTES # BLD AUTO: 1 K/UL (ref 1–4.8)
LYMPHOCYTES NFR BLD: 11 % (ref 18–48)
MCH RBC QN AUTO: 30.1 PG (ref 27–31)
MCHC RBC AUTO-ENTMCNC: 31.4 G/DL (ref 32–36)
MCV RBC AUTO: 96 FL (ref 82–98)
MONOCYTES # BLD AUTO: 1.3 K/UL (ref 0.3–1)
MONOCYTES NFR BLD: 13.9 % (ref 4–15)
NEUTROPHILS # BLD AUTO: 6.7 K/UL (ref 1.8–7.7)
NEUTROPHILS NFR BLD: 72.1 % (ref 38–73)
NRBC BLD-RTO: 0 /100 WBC
PLATELET # BLD AUTO: 308 K/UL (ref 150–450)
PMV BLD AUTO: 10.9 FL (ref 9.2–12.9)
POTASSIUM SERPL-SCNC: 4.6 MMOL/L (ref 3.5–5.1)
PROT SERPL-MCNC: 6.6 G/DL (ref 6–8.4)
RBC # BLD AUTO: 3.36 M/UL (ref 4–5.4)
SODIUM SERPL-SCNC: 140 MMOL/L (ref 136–145)
WBC # BLD AUTO: 9.34 K/UL (ref 3.9–12.7)

## 2024-10-15 PROCEDURE — 36415 COLL VENOUS BLD VENIPUNCTURE: CPT | Mod: HCNC | Performed by: INTERNAL MEDICINE

## 2024-10-15 PROCEDURE — 80053 COMPREHEN METABOLIC PANEL: CPT | Mod: HCNC | Performed by: INTERNAL MEDICINE

## 2024-10-15 PROCEDURE — 85025 COMPLETE CBC W/AUTO DIFF WBC: CPT | Mod: HCNC | Performed by: INTERNAL MEDICINE

## 2024-10-29 ENCOUNTER — TELEPHONE (OUTPATIENT)
Dept: HEMATOLOGY/ONCOLOGY | Facility: CLINIC | Age: 74
End: 2024-10-29
Payer: MEDICARE

## 2024-10-30 ENCOUNTER — TELEPHONE (OUTPATIENT)
Dept: HEMATOLOGY/ONCOLOGY | Facility: CLINIC | Age: 74
End: 2024-10-30
Payer: MEDICARE

## 2024-10-31 ENCOUNTER — HOSPITAL ENCOUNTER (OUTPATIENT)
Dept: RADIOLOGY | Facility: HOSPITAL | Age: 74
Discharge: HOME | End: 2024-10-31
Attending: STUDENT IN AN ORGANIZED HEALTH CARE EDUCATION/TRAINING PROGRAM
Payer: MEDICARE

## 2024-10-31 ENCOUNTER — OFFICE VISIT (OUTPATIENT)
Dept: URGENT CARE | Facility: CLINIC | Age: 74
End: 2024-10-31
Payer: MEDICARE

## 2024-10-31 VITALS
BODY MASS INDEX: 20.11 KG/M2 | DIASTOLIC BLOOD PRESSURE: 73 MMHG | HEART RATE: 82 BPM | SYSTOLIC BLOOD PRESSURE: 154 MMHG | RESPIRATION RATE: 14 BRPM | WEIGHT: 106.5 LBS | TEMPERATURE: 98 F | OXYGEN SATURATION: 97 % | HEIGHT: 61 IN

## 2024-10-31 DIAGNOSIS — A31.0 MYCOBACTERIUM AVIUM INFECTION: Primary | Chronic | ICD-10-CM

## 2024-10-31 DIAGNOSIS — R05.9 COUGH, UNSPECIFIED TYPE: ICD-10-CM

## 2024-10-31 DIAGNOSIS — N20.0 RIGHT KIDNEY STONE: ICD-10-CM

## 2024-10-31 DIAGNOSIS — D84.9 IMMUNOCOMPROMISED: ICD-10-CM

## 2024-10-31 PROBLEM — I21.4 NSTEMI (NON-ST ELEVATED MYOCARDIAL INFARCTION): Status: ACTIVE | Noted: 2024-01-24

## 2024-10-31 PROBLEM — N39.46 MIXED STRESS AND URGE URINARY INCONTINENCE: Status: ACTIVE | Noted: 2020-03-11

## 2024-10-31 PROBLEM — N81.4 CYSTOCELE WITH PROLAPSE: Status: ACTIVE | Noted: 2022-08-22

## 2024-10-31 PROCEDURE — 76770 US EXAM ABDO BACK WALL COMP: CPT | Mod: TC,HCNC

## 2024-10-31 PROCEDURE — 76770 US EXAM ABDO BACK WALL COMP: CPT | Mod: 26,HCNC,, | Performed by: INTERNAL MEDICINE

## 2024-10-31 PROCEDURE — 71046 X-RAY EXAM CHEST 2 VIEWS: CPT | Mod: S$GLB,,, | Performed by: RADIOLOGY

## 2024-10-31 PROCEDURE — 99214 OFFICE O/P EST MOD 30 MIN: CPT | Mod: S$GLB,,, | Performed by: FAMILY MEDICINE

## 2024-10-31 RX ORDER — BENZONATATE 200 MG/1
200 CAPSULE ORAL 3 TIMES DAILY PRN
Qty: 30 CAPSULE | Refills: 0 | Status: SHIPPED | OUTPATIENT
Start: 2024-10-31 | End: 2024-11-10

## 2024-10-31 RX ORDER — CODEINE PHOSPHATE AND GUAIFENESIN 10; 100 MG/5ML; MG/5ML
5 SOLUTION ORAL 3 TIMES DAILY PRN
Qty: 100 ML | Refills: 0 | Status: SHIPPED | OUTPATIENT
Start: 2024-10-31 | End: 2024-11-10

## 2024-10-31 RX ORDER — AZITHROMYCIN 250 MG/1
TABLET, FILM COATED ORAL
Qty: 6 TABLET | Refills: 0 | Status: SHIPPED | OUTPATIENT
Start: 2024-10-31 | End: 2024-11-05

## 2024-11-04 ENCOUNTER — PATIENT OUTREACH (OUTPATIENT)
Dept: ADMINISTRATIVE | Facility: HOSPITAL | Age: 74
End: 2024-11-04
Payer: MEDICARE

## 2024-11-04 NOTE — PROGRESS NOTES
BP - Pt has future Out-Pt appt scheduled in 2024. Gap report updated. Immunization's updated/triggered.

## 2024-11-05 ENCOUNTER — OFFICE VISIT (OUTPATIENT)
Dept: FAMILY MEDICINE | Facility: CLINIC | Age: 74
End: 2024-11-05
Payer: MEDICARE

## 2024-11-05 VITALS
BODY MASS INDEX: 20.06 KG/M2 | WEIGHT: 106.25 LBS | SYSTOLIC BLOOD PRESSURE: 158 MMHG | HEART RATE: 80 BPM | OXYGEN SATURATION: 100 % | HEIGHT: 61 IN | TEMPERATURE: 98 F | DIASTOLIC BLOOD PRESSURE: 92 MMHG | RESPIRATION RATE: 18 BRPM

## 2024-11-05 DIAGNOSIS — R06.2 WHEEZING: ICD-10-CM

## 2024-11-05 DIAGNOSIS — R06.02 SHORTNESS OF BREATH: Primary | ICD-10-CM

## 2024-11-05 DIAGNOSIS — D86.0 SARCOIDOSIS OF LUNG: ICD-10-CM

## 2024-11-05 PROCEDURE — 1101F PT FALLS ASSESS-DOCD LE1/YR: CPT | Mod: HCNC,CPTII,S$GLB, | Performed by: FAMILY MEDICINE

## 2024-11-05 PROCEDURE — 99999 PR PBB SHADOW E&M-EST. PATIENT-LVL III: CPT | Mod: PBBFAC,HCNC,, | Performed by: FAMILY MEDICINE

## 2024-11-05 PROCEDURE — 3288F FALL RISK ASSESSMENT DOCD: CPT | Mod: HCNC,CPTII,S$GLB, | Performed by: FAMILY MEDICINE

## 2024-11-05 PROCEDURE — 94640 AIRWAY INHALATION TREATMENT: CPT | Mod: HCNC,S$GLB,, | Performed by: FAMILY MEDICINE

## 2024-11-05 PROCEDURE — 0241U SARS-COV2 (COVID) WITH FLU/RSV BY PCR: CPT | Mod: HCNC | Performed by: FAMILY MEDICINE

## 2024-11-05 PROCEDURE — 99214 OFFICE O/P EST MOD 30 MIN: CPT | Mod: HCNC,25,S$GLB, | Performed by: FAMILY MEDICINE

## 2024-11-05 PROCEDURE — 3080F DIAST BP >= 90 MM HG: CPT | Mod: HCNC,CPTII,S$GLB, | Performed by: FAMILY MEDICINE

## 2024-11-05 PROCEDURE — 3077F SYST BP >= 140 MM HG: CPT | Mod: HCNC,CPTII,S$GLB, | Performed by: FAMILY MEDICINE

## 2024-11-05 PROCEDURE — 1125F AMNT PAIN NOTED PAIN PRSNT: CPT | Mod: HCNC,CPTII,S$GLB, | Performed by: FAMILY MEDICINE

## 2024-11-05 RX ORDER — IPRATROPIUM BROMIDE AND ALBUTEROL SULFATE 2.5; .5 MG/3ML; MG/3ML
3 SOLUTION RESPIRATORY (INHALATION)
Status: COMPLETED | OUTPATIENT
Start: 2024-11-05 | End: 2024-11-05

## 2024-11-05 RX ORDER — IPRATROPIUM BROMIDE AND ALBUTEROL SULFATE 2.5; .5 MG/3ML; MG/3ML
3 SOLUTION RESPIRATORY (INHALATION) EVERY 6 HOURS PRN
Qty: 75 ML | Refills: 0 | Status: SHIPPED | OUTPATIENT
Start: 2024-11-05 | End: 2025-11-05

## 2024-11-05 RX ADMIN — IPRATROPIUM BROMIDE AND ALBUTEROL SULFATE 3 ML: 2.5; .5 SOLUTION RESPIRATORY (INHALATION) at 04:11

## 2024-11-06 ENCOUNTER — PATIENT MESSAGE (OUTPATIENT)
Dept: FAMILY MEDICINE | Facility: CLINIC | Age: 74
End: 2024-11-06
Payer: MEDICARE

## 2024-11-06 ENCOUNTER — HOSPITAL ENCOUNTER (EMERGENCY)
Facility: HOSPITAL | Age: 74
Discharge: HOME | End: 2024-11-07
Attending: STUDENT IN AN ORGANIZED HEALTH CARE EDUCATION/TRAINING PROGRAM
Payer: MEDICARE

## 2024-11-06 ENCOUNTER — TELEPHONE (OUTPATIENT)
Dept: PULMONOLOGY | Facility: CLINIC | Age: 74
End: 2024-11-06
Payer: MEDICARE

## 2024-11-06 DIAGNOSIS — R06.02 SHORTNESS OF BREATH: ICD-10-CM

## 2024-11-06 DIAGNOSIS — D86.0 SARCOIDOSIS, LUNG: Primary | ICD-10-CM

## 2024-11-06 LAB
ALBUMIN SERPL BCP-MCNC: 2.9 G/DL (ref 3.5–5.2)
ALLENS TEST: ABNORMAL
ALP SERPL-CCNC: 87 U/L (ref 40–150)
ALT SERPL W/O P-5'-P-CCNC: 10 U/L (ref 10–44)
ANION GAP SERPL CALC-SCNC: 10 MMOL/L (ref 8–16)
AST SERPL-CCNC: 16 U/L (ref 10–40)
BASOPHILS # BLD AUTO: 0.02 K/UL (ref 0–0.2)
BASOPHILS NFR BLD: 0.3 % (ref 0–1.9)
BILIRUB SERPL-MCNC: 0.3 MG/DL (ref 0.1–1)
BNP SERPL-MCNC: 113 PG/ML (ref 0–99)
BUN SERPL-MCNC: 18 MG/DL (ref 8–23)
CALCIUM SERPL-MCNC: 9.7 MG/DL (ref 8.7–10.5)
CHLORIDE SERPL-SCNC: 103 MMOL/L (ref 95–110)
CO2 SERPL-SCNC: 26 MMOL/L (ref 23–29)
CREAT SERPL-MCNC: 1.1 MG/DL (ref 0.5–1.4)
DIFFERENTIAL METHOD BLD: ABNORMAL
EOSINOPHIL # BLD AUTO: 0.3 K/UL (ref 0–0.5)
EOSINOPHIL NFR BLD: 4.6 % (ref 0–8)
ERYTHROCYTE [DISTWIDTH] IN BLOOD BY AUTOMATED COUNT: 13.2 % (ref 11.5–14.5)
EST. GFR  (NO RACE VARIABLE): 52 ML/MIN/1.73 M^2
GLUCOSE SERPL-MCNC: 109 MG/DL (ref 70–110)
HCO3 UR-SCNC: 27.6 MMOL/L (ref 24–28)
HCT VFR BLD AUTO: 30.3 % (ref 37–48.5)
HGB BLD-MCNC: 9.7 G/DL (ref 12–16)
IMM GRANULOCYTES # BLD AUTO: 0.05 K/UL (ref 0–0.04)
IMM GRANULOCYTES NFR BLD AUTO: 0.7 % (ref 0–0.5)
LACTATE SERPL-SCNC: 0.9 MMOL/L (ref 0.5–2.2)
LYMPHOCYTES # BLD AUTO: 1 K/UL (ref 1–4.8)
LYMPHOCYTES NFR BLD: 14.8 % (ref 18–48)
MAGNESIUM SERPL-MCNC: 1.9 MG/DL (ref 1.6–2.6)
MCH RBC QN AUTO: 29.1 PG (ref 27–31)
MCHC RBC AUTO-ENTMCNC: 32 G/DL (ref 32–36)
MCV RBC AUTO: 91 FL (ref 82–98)
MONOCYTES # BLD AUTO: 0.5 K/UL (ref 0.3–1)
MONOCYTES NFR BLD: 6.9 % (ref 4–15)
NEUTROPHILS # BLD AUTO: 5.1 K/UL (ref 1.8–7.7)
NEUTROPHILS NFR BLD: 72.7 % (ref 38–73)
NRBC BLD-RTO: 0 /100 WBC
PCO2 BLDA: 37.6 MMHG (ref 35–45)
PH SMN: 7.47 [PH] (ref 7.35–7.45)
PLATELET # BLD AUTO: 399 K/UL (ref 150–450)
PMV BLD AUTO: 10.7 FL (ref 9.2–12.9)
PO2 BLDA: 14 MMHG (ref 40–60)
POC BE: 4 MMOL/L
POC SATURATED O2: 21 % (ref 95–100)
POC TCO2: 29 MMOL/L (ref 24–29)
POTASSIUM SERPL-SCNC: 4.2 MMOL/L (ref 3.5–5.1)
PROCALCITONIN SERPL IA-MCNC: 0.04 NG/ML
PROT SERPL-MCNC: 7 G/DL (ref 6–8.4)
RBC # BLD AUTO: 3.33 M/UL (ref 4–5.4)
SAMPLE: ABNORMAL
SITE: ABNORMAL
SODIUM SERPL-SCNC: 139 MMOL/L (ref 136–145)
TROPONIN I SERPL DL<=0.01 NG/ML-MCNC: <0.006 NG/ML (ref 0–0.03)
WBC # BLD AUTO: 6.96 K/UL (ref 3.9–12.7)

## 2024-11-06 PROCEDURE — 83880 ASSAY OF NATRIURETIC PEPTIDE: CPT | Mod: HCNC | Performed by: NURSE PRACTITIONER

## 2024-11-06 PROCEDURE — 84145 PROCALCITONIN (PCT): CPT | Mod: HCNC | Performed by: STUDENT IN AN ORGANIZED HEALTH CARE EDUCATION/TRAINING PROGRAM

## 2024-11-06 PROCEDURE — 93010 ELECTROCARDIOGRAM REPORT: CPT | Mod: HCNC,,, | Performed by: INTERNAL MEDICINE

## 2024-11-06 PROCEDURE — 96375 TX/PRO/DX INJ NEW DRUG ADDON: CPT | Mod: HCNC

## 2024-11-06 PROCEDURE — 82803 BLOOD GASES ANY COMBINATION: CPT | Mod: HCNC

## 2024-11-06 PROCEDURE — 25000242 PHARM REV CODE 250 ALT 637 W/ HCPCS: Mod: HCNC | Performed by: STUDENT IN AN ORGANIZED HEALTH CARE EDUCATION/TRAINING PROGRAM

## 2024-11-06 PROCEDURE — 99900035 HC TECH TIME PER 15 MIN (STAT): Mod: HCNC

## 2024-11-06 PROCEDURE — 99285 EMERGENCY DEPT VISIT HI MDM: CPT | Mod: 25,HCNC

## 2024-11-06 PROCEDURE — 84484 ASSAY OF TROPONIN QUANT: CPT | Mod: HCNC | Performed by: NURSE PRACTITIONER

## 2024-11-06 PROCEDURE — 83735 ASSAY OF MAGNESIUM: CPT | Mod: HCNC | Performed by: NURSE PRACTITIONER

## 2024-11-06 PROCEDURE — 93005 ELECTROCARDIOGRAM TRACING: CPT | Mod: HCNC

## 2024-11-06 PROCEDURE — 96374 THER/PROPH/DIAG INJ IV PUSH: CPT | Mod: HCNC

## 2024-11-06 PROCEDURE — 83605 ASSAY OF LACTIC ACID: CPT | Mod: HCNC | Performed by: STUDENT IN AN ORGANIZED HEALTH CARE EDUCATION/TRAINING PROGRAM

## 2024-11-06 PROCEDURE — 80053 COMPREHEN METABOLIC PANEL: CPT | Mod: HCNC | Performed by: NURSE PRACTITIONER

## 2024-11-06 PROCEDURE — 85025 COMPLETE CBC W/AUTO DIFF WBC: CPT | Mod: HCNC | Performed by: NURSE PRACTITIONER

## 2024-11-06 PROCEDURE — 63600175 PHARM REV CODE 636 W HCPCS: Mod: HCNC | Performed by: STUDENT IN AN ORGANIZED HEALTH CARE EDUCATION/TRAINING PROGRAM

## 2024-11-06 PROCEDURE — 25500020 PHARM REV CODE 255: Mod: HCNC | Performed by: STUDENT IN AN ORGANIZED HEALTH CARE EDUCATION/TRAINING PROGRAM

## 2024-11-06 PROCEDURE — 94644 CONT INHLJ TX 1ST HOUR: CPT | Mod: HCNC

## 2024-11-06 RX ORDER — HYDRALAZINE HYDROCHLORIDE 20 MG/ML
10 INJECTION INTRAMUSCULAR; INTRAVENOUS
Status: COMPLETED | OUTPATIENT
Start: 2024-11-07 | End: 2024-11-07

## 2024-11-06 RX ORDER — PREDNISONE 20 MG/1
40 TABLET ORAL DAILY
Qty: 10 TABLET | Refills: 0 | Status: SHIPPED | OUTPATIENT
Start: 2024-11-06 | End: 2024-11-11

## 2024-11-06 RX ORDER — METHYLPREDNISOLONE SOD SUCC 125 MG
125 VIAL (EA) INJECTION
Status: COMPLETED | OUTPATIENT
Start: 2024-11-06 | End: 2024-11-06

## 2024-11-06 RX ORDER — ALBUTEROL SULFATE 2.5 MG/.5ML
15 SOLUTION RESPIRATORY (INHALATION)
Status: COMPLETED | OUTPATIENT
Start: 2024-11-06 | End: 2024-11-06

## 2024-11-06 RX ORDER — IPRATROPIUM BROMIDE 0.5 MG/2.5ML
1 SOLUTION RESPIRATORY (INHALATION)
Status: COMPLETED | OUTPATIENT
Start: 2024-11-06 | End: 2024-11-06

## 2024-11-06 RX ADMIN — IPRATROPIUM BROMIDE 1 MG: 0.5 SOLUTION RESPIRATORY (INHALATION) at 08:11

## 2024-11-06 RX ADMIN — ALBUTEROL SULFATE 15 MG: 2.5 SOLUTION RESPIRATORY (INHALATION) at 08:11

## 2024-11-06 RX ADMIN — IOHEXOL 75 ML: 350 INJECTION, SOLUTION INTRAVENOUS at 10:11

## 2024-11-06 RX ADMIN — METHYLPREDNISOLONE SODIUM SUCCINATE 125 MG: 125 INJECTION, POWDER, FOR SOLUTION INTRAMUSCULAR; INTRAVENOUS at 07:11

## 2024-11-06 NOTE — TELEPHONE ENCOUNTER
Call was returned to patient in regards to her recent visit to Urgent Care. Patient states she's still coughing. Patient has been using her nebulizer as needed. Patient finished her azithromycin (Z-RACHAEL) 250 MG tablet. Patient was seen by her PCP on 11/5/24. Patient is still having no relief. I informed patient that I will send Dr. Romero a message to review and advised. Patient verbalized understanding.    Patient is scheduled for a follow up on 2/28/25 at 11 am.

## 2024-11-06 NOTE — TELEPHONE ENCOUNTER
----- Message from Comfort sent at 11/6/2024 10:40 AM CST -----  Regarding: ER visit  Contact: Pt [phone]  Pt is calling to state she went to ER and was given some meds and states it hurts to breath in and hurts to cough please call

## 2024-11-07 ENCOUNTER — OFFICE VISIT (OUTPATIENT)
Dept: UROLOGY | Facility: CLINIC | Age: 74
End: 2024-11-07
Payer: MEDICARE

## 2024-11-07 ENCOUNTER — PATIENT MESSAGE (OUTPATIENT)
Dept: PULMONOLOGY | Facility: CLINIC | Age: 74
End: 2024-11-07
Payer: MEDICARE

## 2024-11-07 VITALS
TEMPERATURE: 98 F | HEIGHT: 61 IN | BODY MASS INDEX: 20.01 KG/M2 | OXYGEN SATURATION: 98 % | RESPIRATION RATE: 19 BRPM | DIASTOLIC BLOOD PRESSURE: 65 MMHG | WEIGHT: 106 LBS | SYSTOLIC BLOOD PRESSURE: 156 MMHG | HEART RATE: 81 BPM

## 2024-11-07 VITALS — BODY MASS INDEX: 19.99 KG/M2 | WEIGHT: 105.81 LBS

## 2024-11-07 DIAGNOSIS — N20.0 RIGHT KIDNEY STONE: ICD-10-CM

## 2024-11-07 DIAGNOSIS — R39.15 URINARY URGENCY: Primary | ICD-10-CM

## 2024-11-07 LAB
INFLUENZA A, MOLECULAR: NOT DETECTED
INFLUENZA B, MOLECULAR: NOT DETECTED
OHS QRS DURATION: 84 MS
OHS QTC CALCULATION: 422 MS
RSV AG BY MOLECULAR METHOD: NOT DETECTED
SARS-COV-2 RNA RESP QL NAA+PROBE: NOT DETECTED

## 2024-11-07 PROCEDURE — 1101F PT FALLS ASSESS-DOCD LE1/YR: CPT | Mod: HCNC,CPTII,S$GLB, | Performed by: STUDENT IN AN ORGANIZED HEALTH CARE EDUCATION/TRAINING PROGRAM

## 2024-11-07 PROCEDURE — 1160F RVW MEDS BY RX/DR IN RCRD: CPT | Mod: HCNC,CPTII,S$GLB, | Performed by: STUDENT IN AN ORGANIZED HEALTH CARE EDUCATION/TRAINING PROGRAM

## 2024-11-07 PROCEDURE — 87088 URINE BACTERIA CULTURE: CPT | Mod: HCNC | Performed by: STUDENT IN AN ORGANIZED HEALTH CARE EDUCATION/TRAINING PROGRAM

## 2024-11-07 PROCEDURE — 99213 OFFICE O/P EST LOW 20 MIN: CPT | Mod: HCNC,S$GLB,, | Performed by: STUDENT IN AN ORGANIZED HEALTH CARE EDUCATION/TRAINING PROGRAM

## 2024-11-07 PROCEDURE — 87086 URINE CULTURE/COLONY COUNT: CPT | Mod: HCNC | Performed by: STUDENT IN AN ORGANIZED HEALTH CARE EDUCATION/TRAINING PROGRAM

## 2024-11-07 PROCEDURE — 63600175 PHARM REV CODE 636 W HCPCS: Mod: HCNC | Performed by: STUDENT IN AN ORGANIZED HEALTH CARE EDUCATION/TRAINING PROGRAM

## 2024-11-07 PROCEDURE — 1159F MED LIST DOCD IN RCRD: CPT | Mod: HCNC,CPTII,S$GLB, | Performed by: STUDENT IN AN ORGANIZED HEALTH CARE EDUCATION/TRAINING PROGRAM

## 2024-11-07 PROCEDURE — 3288F FALL RISK ASSESSMENT DOCD: CPT | Mod: HCNC,CPTII,S$GLB, | Performed by: STUDENT IN AN ORGANIZED HEALTH CARE EDUCATION/TRAINING PROGRAM

## 2024-11-07 PROCEDURE — 99999 PR PBB SHADOW E&M-EST. PATIENT-LVL IV: CPT | Mod: PBBFAC,HCNC,, | Performed by: STUDENT IN AN ORGANIZED HEALTH CARE EDUCATION/TRAINING PROGRAM

## 2024-11-07 PROCEDURE — 1126F AMNT PAIN NOTED NONE PRSNT: CPT | Mod: HCNC,CPTII,S$GLB, | Performed by: STUDENT IN AN ORGANIZED HEALTH CARE EDUCATION/TRAINING PROGRAM

## 2024-11-07 PROCEDURE — 87186 SC STD MICRODIL/AGAR DIL: CPT | Mod: HCNC | Performed by: STUDENT IN AN ORGANIZED HEALTH CARE EDUCATION/TRAINING PROGRAM

## 2024-11-07 RX ORDER — SODIUM CHLORIDE FOR INHALATION 3 %
4 VIAL, NEBULIZER (ML) INHALATION 2 TIMES DAILY
Qty: 240 ML | Refills: 3 | Status: SHIPPED | OUTPATIENT
Start: 2024-11-07 | End: 2025-03-07

## 2024-11-07 RX ADMIN — HYDRALAZINE HYDROCHLORIDE 10 MG: 20 INJECTION INTRAMUSCULAR; INTRAVENOUS at 12:11

## 2024-11-07 NOTE — ED PROVIDER NOTES
Encounter Date: 11/6/2024       History     Chief Complaint   Patient presents with    Back Pain    Fever    Cough     Pt presents to ER with complaints of cough, back pain and fever. Pt states she has MAC and Sarcoidosis. Pt states she does nebulizer treatments at home. Pt is currently SOB. Pt denies HA but admits to chest tightness and chest pain when she coughs. Pt denies any other issues. Pt is currently on blood thinners, Plavix and low dose ASA.     74 y.o. female who has a past medical history of CAD (coronary artery disease), Diabetes mellitus, Hypertension, Migraine, Seizures and possible sarcoidosis presents to the emergency department due to  cough upper back pain and low-grade fevers that has been progressively worsening for the past 2 weeks but has been ongoing for almost a month and a half.  She reports initially about a month ago had upper respiratory type symptoms with cough congestion.  She reports being seen at urgent care and was treated with Mucinex.  She reports at that time the productive nature of her cough improved but had persistence and coughing.  She reports given nonstop coughing she was recently seen at urgent care about a week and a half ago and at that time was prescribed azithromycin and albuterol nebulizer which she has been using with no significant improvement.  She did report being evaluated by her primary care doctor 2 days ago and at that time was given DuoNeb treatment with minimal improvement..     She reports after being diagnosed with possible sarcoid that beginning of the year she was started on prednisone and was doing well.  She states the steroids pain controlling her hyperglycemic events difficult in hopes to spare the use of chronic steroids she is currently being transitioned to methotrexate and has been off steroids for proximally a month.     The history is provided by the patient.     Review of patient's allergies indicates:   Allergen Reactions    Atorvastatin  calcium Other (See Comments)     Pain    Metformin Diarrhea     Diarrhea and cramping    Rosuvastatin Other (See Comments)     pain    Statins-hmg-coa reductase inhibitors      Past Medical History:   Diagnosis Date    CAD (coronary artery disease)     Diabetes mellitus     Hypertension     Migraine     Seizures      Past Surgical History:   Procedure Laterality Date    APPENDECTOMY      CHOLECYSTECTOMY      CORONARY ANGIOPLASTY WITH STENT PLACEMENT  2017    CORONARY ARTERY BYPASS GRAFT      ENDOSCOPIC ULTRASOUND OF UPPER GASTROINTESTINAL TRACT N/A 2024    Procedure: ULTRASOUND, UPPER GI TRACT, ENDOSCOPIC;  Surgeon: Lavell Lomax MD;  Location: [facility] ENDO;  Service: Endoscopy;  Laterality: N/A;   portal- EUS-guided biopsy of anastacia hepatis mass.    HEEL SPUR EXCISION      HIP FRACTURE SURGERY Left 2018    HYSTERECTOMY      TONSILLECTOMY      URETEROSCOPIC REMOVAL OF URETERIC CALCULUS Right 10/3/2024    Procedure: REMOVAL, CALCULUS, URETER, URETEROSCOPIC Laser lithotripsy Retrograde pyelogram Ureteral stent placement;  Surgeon: Annamarie Lake MD;  Location: [facility] OR;  Service: Urology;  Laterality: Right;  holmium laser 365 madrigal, time 2:13, energy 0.76kj    URETHRAL SLING      WRIST SURGERY Right      Family History   Problem Relation Name Age of Onset    Lung cancer Father      Breast cancer Sister      Cancer Maternal Uncle      Cancer Paternal Aunt      Cancer Maternal Grandmother       Social History     Tobacco Use    Smoking status: Former     Current packs/day: 0.00     Types: Cigarettes     Quit date:      Years since quittin.8    Smokeless tobacco: Never   Substance Use Topics    Alcohol use: Yes     Alcohol/week: 1.0 standard drink of alcohol     Types: 1 Glasses of wine per week    Drug use: Never     Review of Systems   Constitutional:  Positive for fever. Negative for chills.   HENT:  Negative for congestion and rhinorrhea.    Eyes:  Negative for pain.   Respiratory:   Positive for cough and shortness of breath.    Cardiovascular:  Positive for chest pain. Negative for leg swelling.   Gastrointestinal:  Negative for abdominal pain, nausea and vomiting.   Genitourinary:  Negative for dysuria and hematuria.   Musculoskeletal:  Positive for back pain. Negative for joint swelling and neck pain.   Skin:  Negative for rash.   Neurological:  Negative for weakness and headaches.       Physical Exam     Initial Vitals [11/06/24 1842]   BP Pulse Resp Temp SpO2   (!) 189/74 78 20 98.1 °F (36.7 °C) 98 %      MAP       --         Physical Exam    Nursing note and vitals reviewed.  Constitutional: She is not diaphoretic. She appears cachectic. No distress.   HENT:   Head: Normocephalic and atraumatic.   Eyes: Conjunctivae and EOM are normal. Pupils are equal, round, and reactive to light.   Neck:   Normal range of motion.  Pulmonary/Chest: Tachypnea noted. No respiratory distress. She has decreased breath sounds in the left middle field and the left lower field. She has no wheezes.   Abdominal: Abdomen is soft. Bowel sounds are normal. She exhibits no distension. There is no abdominal tenderness.   Musculoskeletal:         General: No tenderness. Normal range of motion.      Cervical back: Normal range of motion.     Neurological: She is alert.   Skin: Skin is warm. Capillary refill takes less than 2 seconds.   Psychiatric: Her behavior is normal.         ED Course   Procedures  Labs Reviewed   CBC W/ AUTO DIFFERENTIAL - Abnormal       Result Value    WBC 6.96      RBC 3.33 (*)     Hemoglobin 9.7 (*)     Hematocrit 30.3 (*)     MCV 91      MCH 29.1      MCHC 32.0      RDW 13.2      Platelets 399      MPV 10.7      Immature Granulocytes 0.7 (*)     Gran # (ANC) 5.1      Immature Grans (Abs) 0.05 (*)     Lymph # 1.0      Mono # 0.5      Eos # 0.3      Baso # 0.02      nRBC 0      Gran % 72.7      Lymph % 14.8 (*)     Mono % 6.9      Eosinophil % 4.6      Basophil % 0.3      Differential Method  Automated     COMPREHENSIVE METABOLIC PANEL - Abnormal    Sodium 139      Potassium 4.2      Chloride 103      CO2 26      Glucose 109      BUN 18      Creatinine 1.1      Calcium 9.7      Total Protein 7.0      Albumin 2.9 (*)     Total Bilirubin 0.3      Alkaline Phosphatase 87      AST 16      ALT 10      eGFR 52 (*)     Anion Gap 10     B-TYPE NATRIURETIC PEPTIDE - Abnormal     (*)    ISTAT PROCEDURE - Abnormal    POC PH 7.474 (*)     POC PCO2 37.6      POC PO2 14 (*)     POC HCO3 27.6      POC BE 4 (*)     POC SATURATED O2 21      POC TCO2 29      Sample VENOUS      Site Other      Allens Test N/A     TROPONIN I    Troponin I <0.006     LACTIC ACID, PLASMA    Lactate (Lactic Acid) 0.9     PROCALCITONIN    Procalcitonin 0.04     MAGNESIUM   MAGNESIUM    Magnesium 1.9            Imaging Results              CTA Chest Non-Coronary (PE Studies) (Final result)  Result time 11/06/24 23:21:54      Final result by Satish Porter DO (11/06/24 23:21:54)                   Impression:      1. No pulmonary embolism to the segmental level.  2. Sequela of sarcoidosis including extensive soft tissue thickening in the mediastinum and the bilateral hilar regions and perilymphatic nodularity, with progression in the left lower lobe and the right upper lobe from prior.  An overlying infectious process is not excluded.      Electronically signed by: Satish Porter  Date:    11/06/2024  Time:    23:21               Narrative:    EXAMINATION:  CTA CHEST NON CORONARY (PE STUDIES)    CLINICAL HISTORY:  Pulmonary embolism (PE) suspected, unknown D-dimer;    TECHNIQUE:  Low dose axial images, sagittal and coronal reformations were obtained from the thoracic inlet to the lung bases following the IV administration of 75 mL of Omnipaque 350.  Contrast timing was optimized to evaluate the pulmonary arteries.  Maximum intensity projection images were provided for review.    COMPARISON:  CT chest, abdomen, and pelvis from  05/10/2024.    FINDINGS:  Pulmonary vasculature: Satisfactory opacification of the pulmonary arterial system with no filling defect to the segmental level.    Aorta: Left-sided aortic arch.  No aneurysm and no significant atherosclerosis.    Base of Neck: No significant abnormality.    Thoracic soft tissues: Normal.    Heart: Normal size. No effusion.  There are post CABG changes.    Isamar/Mediastinum: Diffuse soft tissue thickening surrounding the pulmonary arteries and throughout the mediastinum and hilar regions bilaterally, similar to the prior study.  There are several calcifications.    Airways: The large airways are patent. No foci of endobronchial filling.    Lungs/Pleura: There is near complete right middle lobe atelectasis.  There are extensive perilymphatic nodular and confluent opacities, mostly in the perihilar regions but present throughout the bilateral lungs.  Findings have progressed in the left lower lobe and the anterior right upper lobe.  The pleural spaces are clear.    Esophagus: Normal.    Upper Abdomen: There is a small hiatal hernia.    Bones: No acute fracture. No suspicious lytic or sclerotic lesions.  There are median sternotomy wires.                                       X-Ray Chest AP Portable (Final result)  Result time 11/06/24 21:05:52      Final result by Janet Carvalho MD (11/06/24 21:05:52)                   Impression:      Relatively stable appearance of the chest radiograph when compared to the study of 10/31/2024.      Electronically signed by: Janet Carvalho  Date:    11/06/2024  Time:    21:05               Narrative:    EXAMINATION:  AP PORTABLE CHEST    CLINICAL HISTORY:  Chest Pain;    TECHNIQUE:  AP portable chest radiograph was submitted.    COMPARISON:  10/31/2024    FINDINGS:  Abdomen is sternotomy changes are present.  AP portable chest radiograph demonstrates a cardiac silhouette within normal limits.  Vascular calcifications are seen at the aortic knob.   There are patchy bilateral opacities, which are relatively stable in a patient with known history of perilymphatic nodular and confluent opacities.  There are no new superimposed areas of focal consolidation.  Mild dextroscoliosis present.  The bones are diffusely osteopenic.  Cholecystectomy clips are present the                                       Medications   methylPREDNISolone sodium succinate injection 125 mg (125 mg Intravenous Given 11/6/24 1956)   albuterol sulfate nebulizer solution 15 mg (15 mg Nebulization Given 11/6/24 2000)   ipratropium 0.02 % nebulizer solution 1 mg (1 mg Nebulization Given 11/6/24 2000)   iohexoL (OMNIPAQUE 350) injection 75 mL (75 mLs Intravenous Given 11/6/24 2226)   hydrALAZINE injection 10 mg (10 mg Intravenous Given 11/7/24 0020)     Medical Decision Making:   History:   Old Medical Records: I decided to obtain old medical records.  Old Records Summarized: other records.       <> Summary of Records: Today, 9/27/24:  Reports having had URI symptoms 3 weeks ago requiring evaluation at . Was told it was non-allergic rhinitis and given Mucinex. She is starting to feel better from that. She has chronic cough, productive of green phlegm but now clearing. Did experience night sweats twice in the interval since last visit. Dyspnea and fatigue are better. She went to [place] on a vacation and was able to do some hiking. She is taking Prednisone 10 mg daily and Methotrexate 10 mg weekly, which takes on a Monday. Her Ca level was 12.6 on 8/16/24, better since then most recently 10.1 on 9/25/24. She is avoiding Vitamin D and spinach. She is planned for ureteroscopy and stone removal on 10/3/24. Glucose control has been difficult . Typically high after steroids and has periods of hypoglycemia to as low as 40. She is working with endocrinologist to make adjustments accordingly.     8/16/24  Her diagnosis of Sarcoidosis started with abnormally high calcium level on blood work, leading to  CT thorax with bilateral perihilar and peribronchial irregular interstitial opacities. She initially underwent bronchoscopy with tbbx at outside hospital in January 2024, but negative for granulomatous disease and malignancy. A (mediastinal) lymph node biopsy with performed with IR on March 1, 2024, resulted in non-caseating granuloma. She was seen by oncology at [facility], and presented at [facility], with recommendation for further sampling. She then underwent a EUS on 4/3/24 with finding of enlarged lymph nodes in middle paraesophageal mediastinum, biopsied with finding of rare granulomas.     Her symptoms included shortness of breath with exertion, cough and significant fatigue. She also experienced night sweats. She was started on Prednisone initially at 30 mg at the end of May 2024, which made her feel great, but this led to uncontrolled glycemic index and difficulty with adjusting her novolog. She was seen in a clinic visit on June 13th 2024, at which time she was recommended to change to MTX as steroid sparring therapy - she started at MTX 2.5 mg weekly (first dose on June 17th) and increased to 5 mg on July 1st. She was instructed to wean off steroids from 15 mg every 5 days and completed steroids on July 3rd 2024. Her fatigue recurred during the wean and persisted off steroids and was is instructed to resume steroids on July 18th, 2024 at 10 mg. Currently her Methotrexate is up to maintenance dose of 10 mg weekly since July 29th. She is taking folic acid daily.     Initial Assessment:   74 y.o. female who has a past medical history of CAD (coronary artery disease), Diabetes mellitus, Hypertension, Migraine, Seizures and possible sarcoidosis presents to the emergency department due to  cough upper back pain and low-grade fevers that has been progressively worsening for the past 2 weeks but has been ongoing for almost a month and a half.  Patient in no significant distress mildly tachypneic.  Decreased air sounds  in the left middle and lower lung fields.  No signs of volume overload on exam.  EKG without signs of ischemia. Cardiac workup completed that did not reveal any evidence of ischemia.  BNP with trivial elevation, CBC without any significant leukocytosis anemia or platelet dysfunction.  Chemistry unremarkable.  CT PE obtained that did not show any pulmonary emboli.  But revealed progressively worsening sarcoidosis including extensive soft tissue thickening in the mediastinum and the bilateral hilar regions and perilymphatic nodularity, with progression in the left lower lobe and the right upper lobe from prior.  Suspect likely cause of patient's persistent shortness of breath.  Patient received DuoNeb treatment as well as Solu-Medrol with minor to moderate improvement of her shortness of breath.  Uncertain if worsening/progression of disease is due to patient being taken off steroid therapy.  Will write a patient was short course of steroids until she has follow-up with her pulmonologist and primary care doctor.  Currently I do not foresee any emergent processes that require admission at this time.  Strict return precautions and anticipatory guidance discussed with patient all questions answered.  Differential Diagnosis:   Differential Diagnosis includes, but is not limited to:  PE, MI/ACS, pneumothorax, pericardial effusion/tamonade, pneumonia, lung abscess, pericarditis/myocarditis, pleural effusion, lung mass, CHF exacerbation, asthma exacerbation, COPD exacerbation, aspirated/ingested foreign body, airway obstruction, CO poisoning, anemia, metabolic derangement, allergy/atopy, influenza, viral URI, viral syndrome.   Clinical Tests:   Lab Tests: Ordered and Reviewed  Radiological Study: Ordered and Reviewed  Medical Tests: Ordered and Reviewed             ED Course as of 11/07/24 1655   Wed Nov 06, 2024   1940 Independent Interpretation of EKG:  Rhythm: Sinus   Rate: 80  QTC: 422  No STEMI   Erratic baseline in  lead 2 [AS]   1945 CBC auto differential(!)  CBC reviewed and interpreted by me:   No significant leukocytosis, anemia (at baseline), or platelet abnormalities.   [AS]   2023 Lactic acid, plasma  Chem 14 reviewed and interpreted by me:  - No significant hypo-or hyper natremia,  kalemia , chloridemia, or other electrolyte abnormalities  - BUN and creatinine (at or around baseline),   - ALT and AST were within normal limits indicating normal liver function.    LA wnl [AS]   2341 CTA Chest Non-Coronary (PE Studies)  Sequela of sarcoidosis including extensive soft tissue thickening in the mediastinum and the bilateral hilar regions and perilymphatic nodularity, with progression in the left lower lobe and the right upper lobe from prior.  An overlying infectious process is not excluded. Pt is currently stable for discharge. I see no indication of an emergent process beyond that addressed during our encounter but have duly counseled the patient/family regarding the need for prompt follow-up as well as the indications that should prompt immediate return to the emergency room should new or worrisome developments occur. I discussed the ED work up and diagnostic findings with the patient/family. The patient/family has been provided with verbal and printed direction regarding our final diagnosis(es) as well as instructions regarding use of OTC and/or Rx medications intended to manage the patient's aforementioned conditions. The patient/family verbalized an understanding. The patient/family is asked if there are any questions or concerns. We discuss the case, until all issues are addressed to the patient/family's satisfaction. Patient/family understands and is agreeable to the plan.  [AS]      ED Course User Index  [AS] Charlie Francisco MD          Medical Decision Making       Critical Care Procedure Note  Authorized and Performed by: Charlie Francisco MD  Total critical care time: 35 minutes  Due to a high probability of  clinically significant, life threatening deterioration, the patient required my highest level of preparedness to intervene emergently and I personally spent this critical care time directly and personally managing the patient. This critical care time included obtaining a history; examining the patient; pulse oximetry; ordering and review of studies; arranging urgent treatment with development of a management plan; evaluation of patient's response to treatment; frequent reassessment; and, discussions with other providers.  This critical care time was performed to assess and manage the high probability of imminent, life-threatening deterioration that could result in multi-organ failure. It was exclusive of separately billable procedures and treating other patients and teaching time.  Please see MDM section and the rest of the note for further information on patient assessment and treatment.    Clinical Impression:   Final diagnoses:  [D86.0] Sarcoidosis, lung (Primary)  [R06.02] Shortness of breath          ED Disposition Condition    Discharge Stable          ED Prescriptions       Medication Sig Dispense Start Date End Date Auth. Provider    predniSONE (DELTASONE) 20 MG tablet Take 2 tablets (40 mg total) by mouth once daily. for 5 days 10 tablet 11/6/2024 11/11/2024 Charlie Francisco MD          Follow-up Information       Follow up With Specialties Details Why Contact Info    Osmar Cruz MD Internal Medicine, Wound Care Schedule an appointment as soon as possible for a visit  for reassesment [address] [facility] [address]  [phone]      [facility] Emergency Dept Emergency Medicine  If symptoms worsen [address] [facility] [place] [address] [phone]    Fredy Romero MD Pulmonary Disease, Critical Care Medicine, Sleep Medicine Schedule an appointment as soon as possible for a visit  for reassesment [address] [facility]  [address]  [phone]              DISCLAIMER: This note was prepared with [employer] voice recognition transcription software. Garbled syntax, mangled pronouns, and other bizarre constructions may be attributed to that software system.     Charlie Francisco MD  11/07/24 0557

## 2024-11-07 NOTE — PROGRESS NOTES
Patient ID: Kylah Deal is a 74 y.o. female.    Chief Complaint: R kidney stone follow up    HPI  74 y.o. who presents to the Urology clinic for evaluation after treatment of R kidney stone. Patient notes recovery went well, removed stent without incident. Denies dysuria, hematuria. Notes urinary urgency and lower abdominal pressure, began last week lasted for 3 days- did not worsen. Denies constipation.         Medically Necessary ROS documented in HPI    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 01/11/2024    Grade I diastolic dysfunction 10/27/2021    Gastroesophageal reflux disease without esophagitis 11/29/2018    Essential tremor 10/27/2021    Hyperlipidemia 11/29/2018    Hypothyroidism 06/01/2021    Long term current use of aspirin 10/27/2021    Hypertensive heart disease with heart failure 03/26/2024    Sarcoidosis of lung 06/13/2024    Thrush, oral 06/13/2024    Right kidney stone 10/03/2024    Cystocele with prolapse 08/22/2022    Mixed stress and urge urinary incontinence 03/11/2020    NSTEMI (non-ST elevated myocardial infarction) 01/24/2024    Immunocompromised 10/31/2024     Resolved Ambulatory Problems     Diagnosis Date Noted    Abnormal CT of the chest 03/18/2024    Dry mouth and eyes 04/24/2024    Acute renal failure 06/13/2024     Past Medical History:   Diagnosis Date    CAD (coronary artery disease)     Diabetes mellitus     Hypertension      Migraine     Seizures          Past Surgical History  Past Surgical History:   Procedure Laterality Date    APPENDECTOMY      CHOLECYSTECTOMY      CORONARY ANGIOPLASTY WITH STENT PLACEMENT  2017    CORONARY ARTERY BYPASS GRAFT      ENDOSCOPIC ULTRASOUND OF UPPER GASTROINTESTINAL TRACT N/A 04/03/2024    Procedure: ULTRASOUND, UPPER GI TRACT, ENDOSCOPIC;  Surgeon: Lavell Lomax MD;  Location: [facility] ENDO;  Service: Endoscopy;  Laterality: N/A;  4/1 portal- EUS-guided biopsy of anastacia hepatis mass.    HEEL SPUR EXCISION  1997    HIP FRACTURE SURGERY Left 2018    HYSTERECTOMY      TONSILLECTOMY      URETEROSCOPIC REMOVAL OF URETERIC CALCULUS Right 10/3/2024    Procedure: REMOVAL, CALCULUS, URETER, URETEROSCOPIC Laser lithotripsy Retrograde pyelogram Ureteral stent placement;  Surgeon: Annamarie Lake MD;  Location: [facility] OR;  Service: Urology;  Laterality: Right;  holmium laser 365 madrigal, time 2:13, energy 0.76kj    URETHRAL SLING  2022    WRIST SURGERY Right 2005       Social History       Medications    Current Outpatient Medications:     albuterol (PROVENTIL) 2.5 mg /3 mL (0.083 %) nebulizer solution, Take 3 mLs (2.5 mg total) by nebulization every 6 (six) hours as needed for Wheezing. Rescue, Disp: 120 mL, Rfl: 5    albuterol (PROVENTIL/VENTOLIN HFA) 90 mcg/actuation inhaler, Inhale 2 puffs into the lungs every 4 (four) hours as needed., Disp: , Rfl:     albuterol-ipratropium (DUO-NEB) 2.5 mg-0.5 mg/3 mL nebulizer solution, Take 3 mLs by nebulization every 6 (six) hours as needed for Wheezing or Shortness of Breath. Rescue, Disp: 75 mL, Rfl: 0    aspirin (ECOTRIN) 81 MG EC tablet, Take 81 mg by mouth once daily., Disp: , Rfl:     benzonatate (TESSALON) 200 MG capsule, Take 1 capsule (200 mg total) by mouth 3 (three) times daily as needed for Cough., Disp: 30 capsule, Rfl: 0    carvediloL (COREG) 12.5 MG tablet, Take 1 tablet by mouth 2 (two) times daily with meals., Disp: , Rfl:     cetirizine (ZYRTEC) 10 MG  tablet, Take 1 tablet (10 mg total) by mouth once daily., Disp: 30 tablet, Rfl: 0    clopidogreL (PLAVIX) 75 mg tablet, Take 1 tablet by mouth once daily., Disp: , Rfl:     denosumab (PROLIA) 60 mg/mL Syrg, Inject 60 mg into the skin every 6 (six) months., Disp: , Rfl:     docusate sodium 100 mg capsule, Take 100 mg by mouth 2 (two) times daily as needed for Constipation., Disp: , Rfl:     evolocumab (REPATHA SURECLICK SUBQ), Inject into the skin., Disp: , Rfl:     ezetimibe (ZETIA) 10 mg tablet, Take 10 mg by mouth once daily., Disp: , Rfl:     fluticasone (VERAMYST) 27.5 mcg/actuation nasal spray, 2 sprays by Nasal route once daily., Disp: , Rfl:     folic acid (FOLVITE) 1 MG tablet, Take 1 tablet (1 mg total) by mouth once daily., Disp: 360 tablet, Rfl: 0    furosemide (LASIX) 20 MG tablet, Take 20 mg by mouth 2 (two) times daily., Disp: , Rfl:     guaiFENesin-codeine 100-10 mg/5 ml (TUSSI-ORGANIDIN NR)  mg/5 mL syrup, Take 5 mLs by mouth 3 (three) times daily as needed for Cough., Disp: 100 mL, Rfl: 0    hydrALAZINE (APRESOLINE) 50 MG tablet, Take 1 tablet by mouth 2 (two) times daily., Disp: , Rfl:     insulin aspart U-100 (NOVOLOG) 100 unit/mL (3 mL) InPn pen, Inject 2 Units into the skin as needed., Disp: , Rfl:     levETIRAcetam (KEPPRA) 500 MG Tab, Take 1 tablet (500 mg total) by mouth 2 (two) times daily., Disp: 180 tablet, Rfl: 3    linaGLIPtin (TRADJENTA) 5 mg Tab tablet, Take 5 mg by mouth once daily., Disp: , Rfl:     lisinopriL (PRINIVIL,ZESTRIL) 40 MG tablet, Take 1 tablet by mouth once daily., Disp: , Rfl:     methotrexate 2.5 MG Tab, Take 6 tablets (15 mg total) by mouth every 7 days., Disp: 72 tablet, Rfl: 0    montelukast (SINGULAIR) 10 mg tablet, Take 10 mg by mouth., Disp: , Rfl:     nitroGLYCERIN (NITROSTAT) 0.4 MG SL tablet, Place 0.4 mg under the tongue., Disp: , Rfl:     oxyCODONE (ROXICODONE) 5 MG immediate release tablet, Take 1 tablet (5 mg total) by mouth every 8 (eight) hours as  needed for Pain (save 1 tablet for day of stent removal)., Disp: 10 tablet, Rfl: 0    pantoprazole (PROTONIX) 40 MG tablet, Take 40 mg by mouth every morning., Disp: , Rfl:     predniSONE (DELTASONE) 20 MG tablet, Take 2 tablets (40 mg total) by mouth once daily. for 5 days, Disp: 10 tablet, Rfl: 0    sodium chloride 3% 3 % nebulizer solution, Take 4 mLs by nebulization every morning., Disp: 120 mL, Rfl: 3    solifenacin (VESICARE) 10 MG tablet, Take 1 tablet by mouth once daily., Disp: , Rfl:   No current facility-administered medications for this visit.    Allergies  Review of patient's allergies indicates:   Allergen Reactions    Atorvastatin calcium Other (See Comments)     Pain    Metformin Diarrhea     Diarrhea and cramping    Rosuvastatin Other (See Comments)     pain    Statins-hmg-coa reductase inhibitors        Patient's PMH, FH, Social hx, Medications, allergies reviewed and updated as pertinent to today's visit    Objective:      Physical Exam  Constitutional:       Appearance: She is well-developed.   HENT:      Head: Normocephalic and atraumatic.   Eyes:      Conjunctiva/sclera: Conjunctivae normal.   Pulmonary:      Effort: Pulmonary effort is normal. No respiratory distress.   Abdominal:      General: Abdomen is flat. There is no distension.      Palpations: Abdomen is soft. There is no mass.      Tenderness: There is no abdominal tenderness. There is no guarding.   Skin:     General: Skin is warm.      Findings: No rash.   Neurological:      Mental Status: She is alert and oriented to person, place, and time.   Psychiatric:         Behavior: Behavior normal.         Stone Source Right Renal VC   Stone Analysis Test Not Performed   Stone Analysis Comment SEE BELOW   Comment: 90% Calcium phosphate (apatite). 10% Calcium oxalate  dihydrate.   Kidney Stone Result Comment SEE BELOW   Comment: For stones containing calcium oxalate, calcium phosphate,  and/or uric acid, a 24 hr urinary supersaturation test  may  help detect underlying risk factors for this type of stone  formation and provide guidance for a stone prevention  strategy.    -------------------ADDITIONAL INFORMATION-------------------  This test was developed and its performance characteristics  determined by [facility] in a manner consistent with CLIA  requirements. This test has not been cleared or approved by  the U.S. Food and Drug Administration.    Test Performed by:  [facility]  [address] [place] [id number]  : Velia Jarvis Ph.D.; CLIA# [id number]       Imaging results: personally reviewed imaging with the following findings: residual stone notes notes in R kidney    Narrative & Impression  EXAMINATION:  US RETROPERITONEAL COMPLETE     CLINICAL HISTORY:  s/p R kidney stone removal, eval for hydro and stones w/ posterior acoustic shadowing; Calculus of kidney     TECHNIQUE:  Ultrasound of the kidneys and urinary bladder was performed including color flow and Doppler evaluation of the kidneys.     COMPARISON:  August 2024 CT.     FINDINGS:  Right kidney: The right kidney measures 10.2 cm.  Resistive index measures 0.73.  No mass. Lower pole 6 mm calculus with additional multiple smaller calculi.  Extrarenal pelvis with no hydronephrosis.     Left kidney: The left kidney measures 9.8 cm. No cortical thinning. No loss of corticomedullary distinction. Resistive index measures 0.67.  No mass. A few small calculi measuring up to 5 mm.  No hydronephrosis.     The bladder is partially distended at the time of scanning and has an unremarkable appearance.     Impression:     Bilateral renal calculi, without hydronephrosis        Electronically signed by:Nila Salgado MD  Date:                                            10/31/2024  Time:                                           10:45  Assessment:       1. Urinary urgency    2. Right kidney stone        Plan:         Calcium phosphate  stone  PTH normal   Calcium normal  Discussed asymptomatic residual stone can be observed  She is encouraged to reach out if she develops R flank pain   Stone former's diet reviewed  FU in 6 months    Discussed she has no stones of the Left when using her baseline CT scan to reference her PINKY results.     Urinary urgency  Urine culture    RTC 6 months or sooner if needed, stone passage alarm symptoms discussed  Urine culture

## 2024-11-07 NOTE — TELEPHONE ENCOUNTER
Called to review CT chest. Increasing area of confluent opacities worse in the LLL and RUL. Known Sacoidosis and MAC (3/3 cultures positive). On MTX. Appt for ID on 11/12/24. Continue bronchodilator neb followed  by HS3% twice a day. Acapella to help with airway clearance. If steroid is needed, take 20 mg daily for a few days.

## 2024-11-07 NOTE — DISCHARGE INSTRUCTIONS
Thank you for coming to our Emergency Department today. It is important to remember that some problems are difficult to diagnose and may not be found during your first visit. Be sure to follow up with your primary care doctor and review any labs/imaging that was performed with them. If you do not have a primary care doctor, you may contact the one listed on your discharge paperwork or you may also call the [facility] Appointment Desk at [phone] to schedule an appointment with one.     All medications may potentially have side effects and it is impossible to predict which medications may give you side effects. If you feel that you are having a negative effect of any medication you should immediately stop taking them and seek medical attention.    Return to the ER with any questions/concerns, new/concerning symptoms, worsening or failure to improve. Do not drive or make any important decisions for 24 hours if you have received any pain medications, sedatives or mood altering drugs during your ER visit.

## 2024-11-07 NOTE — ED TRIAGE NOTES
Pt arrived to ED with c/o right sided chest pain, cough, back pain on going since a week. Pt state having HX of CAD, sarcoidosis of lungs and MAC. Pt is alert and oriented on arrival to ED.

## 2024-11-09 LAB — BACTERIA UR CULT: ABNORMAL

## 2024-11-10 RX ORDER — CEPHALEXIN 500 MG/1
500 CAPSULE ORAL EVERY 12 HOURS
Qty: 10 CAPSULE | Refills: 0 | Status: SHIPPED | OUTPATIENT
Start: 2024-11-10 | End: 2024-11-15

## 2024-11-10 NOTE — PROGRESS NOTES
"  Patient Name: Kylah Deal    : 1950  MRN: [id number]      Subjective:     Patient ID: Kylah is a 74 y.o. female    Chief Complaint:  Cough, Fatigue, and Nasal Congestion    History of Present Illness              Review of Systems     Objective:   BP (!) 158/92 (BP Location: Right arm, Patient Position: Sitting)   Pulse 80   Temp 98.4 °F (36.9 °C) (Oral)   Resp 18   Ht 5' 1" (1.549 m)   Wt 48.2 kg (106 lb 4.2 oz)   LMP  (LMP Unknown)   SpO2 100%   BMI 20.08 kg/m²     Physical Exam     Physical Exam              Assessment        ICD-10-CM ICD-9-CM   1. Shortness of breath  R06.02 786.05   2. Wheezing  R06.2 786.07   3. Sarcoidosis of lung  D86.0 135     517.8         Plan:   Assessment & Plan                1. Shortness of breath  -     albuterol-ipratropium 2.5 mg-0.5 mg/3 mL nebulizer solution 3 mL  -     SARS-Cov2 (COVID) with FLU/RSV by PCR  -     albuterol-ipratropium (DUO-NEB) 2.5 mg-0.5 mg/3 mL nebulizer solution; Take 3 mLs by nebulization every 6 (six) hours as needed for Wheezing or Shortness of Breath. Rescue  Dispense: 75 mL; Refill: 0    2. Wheezing  -     albuterol-ipratropium 2.5 mg-0.5 mg/3 mL nebulizer solution 3 mL  -     SARS-Cov2 (COVID) with FLU/RSV by PCR  -     albuterol-ipratropium (DUO-NEB) 2.5 mg-0.5 mg/3 mL nebulizer solution; Take 3 mLs by nebulization every 6 (six) hours as needed for Wheezing or Shortness of Breath. Rescue  Dispense: 75 mL; Refill: 0    3. Sarcoidosis of lung  -     albuterol-ipratropium (DUO-NEB) 2.5 mg-0.5 mg/3 mL nebulizer solution; Take 3 mLs by nebulization every 6 (six) hours as needed for Wheezing or Shortness of Breath. Rescue  Dispense: 75 mL; Refill: 0             -Alen Scott Jr., MD, AAHIVS      This note was generated with the assistance of ambient listening technology. Verbal consent was obtained by the patient and accompanying visitor(s) for the recording of patient appointment to facilitate this note. I attest to having reviewed " and edited the generated note for accuracy, though some syntax or spelling errors may persist. Please contact the author of this note for any clarification.      There are no Patient Instructions on file for this visit.      No follow-ups on file.   Future Appointments   Date Time Provider Department Center   11/12/2024  1:00 PM Donna Jimenez MD [facility] ID [address]   11/25/2024 10:35 AM LAB, [facility] LAB [facility]   12/2/2024  8:00 AM Karla Sanchez MD [facility] [id number]   2/28/2025 11:00 AM Fredy Romero MD [facility] P[address] [address]   5/8/2025  9:30 AM Annamarie Lake MD [facility]            potentially related to prior steroid use    RESPIRATORY SYMPTOMS:  Started DuoNeb (albuterol and ipratropium) via nebulizer in office.  Administered nebulizer treatment with DuoNeb in office.  May continue this at home.  Explained that home COVID tests are equivalent to clinic tests.  Ordered COVID-19 swab test (results expected in 1-2 days).  Follow up in 1-2 days for COVID-19 test results.            1. Shortness of breath  -     albuterol-ipratropium 2.5 mg-0.5 mg/3 mL nebulizer solution 3 mL  -     SARS-Cov2 (COVID) with FLU/RSV by PCR  -     albuterol-ipratropium (DUO-NEB) 2.5 mg-0.5 mg/3 mL nebulizer solution; Take 3 mLs by nebulization every 6 (six) hours as needed for Wheezing or Shortness of Breath. Rescue  Dispense: 75 mL; Refill: 0    2. Wheezing  -     albuterol-ipratropium 2.5 mg-0.5 mg/3 mL nebulizer solution 3 mL  -     SARS-Cov2 (COVID) with FLU/RSV by PCR  -     albuterol-ipratropium (DUO-NEB) 2.5 mg-0.5 mg/3 mL nebulizer solution; Take 3 mLs by nebulization every 6 (six) hours as needed for Wheezing or Shortness of Breath. Rescue  Dispense: 75 mL; Refill: 0    3. Sarcoidosis of lung  -     albuterol-ipratropium (DUO-NEB) 2.5 mg-0.5 mg/3 mL nebulizer solution; Take 3 mLs by nebulization every 6 (six) hours as needed for Wheezing or Shortness of Breath. Rescue  Dispense: 75 mL; Refill: 0             -Alen Scott Jr., MD, AAHIVS      This note was generated with the assistance of ambient listening technology. Verbal consent was obtained by the patient and accompanying visitor(s) for the recording of patient appointment to facilitate this note. I attest to having reviewed and edited the generated note for accuracy, though some syntax or spelling errors may persist. Please contact the author of this note for any clarification.      There are no Patient Instructions on file for this visit.      No follow-ups on file.   Future Appointments   Date Time Provider Department Center   11/12/2024  1:00 PM  Donna Jimenez MD [facility] ID [address]   11/25/2024 10:35 AM LAB, [facility] LAB [facility]   12/2/2024  8:00 AM Karla Sanchez MD [facility] [id number]   2/28/2025 11:00 AM Fredy Romero MD [facility] [address] [address]   5/8/2025  9:30 AM Annamarie Lake MD [facility]

## 2024-11-11 ENCOUNTER — TELEPHONE (OUTPATIENT)
Dept: UROLOGY | Facility: CLINIC | Age: 74
End: 2024-11-11
Payer: MEDICARE

## 2024-11-11 NOTE — TELEPHONE ENCOUNTER
----- Message from Annamarie Lake MD sent at 11/10/2024  3:14 PM CST -----  Alert pt abx sent for UTI

## 2024-11-12 ENCOUNTER — TELEPHONE (OUTPATIENT)
Dept: UROLOGY | Facility: CLINIC | Age: 74
End: 2024-11-12
Payer: MEDICARE

## 2024-11-12 ENCOUNTER — OFFICE VISIT (OUTPATIENT)
Dept: INFECTIOUS DISEASES | Facility: CLINIC | Age: 74
End: 2024-11-12
Payer: MEDICARE

## 2024-11-12 VITALS
DIASTOLIC BLOOD PRESSURE: 65 MMHG | BODY MASS INDEX: 19.6 KG/M2 | WEIGHT: 103.81 LBS | HEART RATE: 69 BPM | HEIGHT: 61 IN | SYSTOLIC BLOOD PRESSURE: 171 MMHG | TEMPERATURE: 98 F

## 2024-11-12 DIAGNOSIS — D86.0 SARCOIDOSIS OF LUNG: ICD-10-CM

## 2024-11-12 PROCEDURE — 99999 PR PBB SHADOW E&M-EST. PATIENT-LVL V: CPT | Mod: PBBFAC,HCNC,, | Performed by: INTERNAL MEDICINE

## 2024-11-12 PROCEDURE — 3077F SYST BP >= 140 MM HG: CPT | Mod: HCNC,CPTII,S$GLB, | Performed by: INTERNAL MEDICINE

## 2024-11-12 PROCEDURE — 3288F FALL RISK ASSESSMENT DOCD: CPT | Mod: HCNC,CPTII,S$GLB, | Performed by: INTERNAL MEDICINE

## 2024-11-12 PROCEDURE — 99205 OFFICE O/P NEW HI 60 MIN: CPT | Mod: HCNC,S$GLB,, | Performed by: INTERNAL MEDICINE

## 2024-11-12 PROCEDURE — 1159F MED LIST DOCD IN RCRD: CPT | Mod: HCNC,CPTII,S$GLB, | Performed by: INTERNAL MEDICINE

## 2024-11-12 PROCEDURE — 1101F PT FALLS ASSESS-DOCD LE1/YR: CPT | Mod: HCNC,CPTII,S$GLB, | Performed by: INTERNAL MEDICINE

## 2024-11-12 PROCEDURE — 1126F AMNT PAIN NOTED NONE PRSNT: CPT | Mod: HCNC,CPTII,S$GLB, | Performed by: INTERNAL MEDICINE

## 2024-11-12 PROCEDURE — 3078F DIAST BP <80 MM HG: CPT | Mod: HCNC,CPTII,S$GLB, | Performed by: INTERNAL MEDICINE

## 2024-11-12 RX ORDER — AZITHROMYCIN 500 MG/1
500 TABLET, FILM COATED ORAL
Qty: 12 TABLET | Refills: 11 | Status: SHIPPED | OUTPATIENT
Start: 2024-11-13 | End: 2025-11-13

## 2024-11-12 RX ORDER — RIFABUTIN 150 MG/1
300 CAPSULE ORAL
Qty: 24 CAPSULE | Refills: 11 | Status: SHIPPED | OUTPATIENT
Start: 2024-11-13 | End: 2025-11-13

## 2024-11-12 RX ORDER — ETHAMBUTOL HYDROCHLORIDE 400 MG/1
1200 TABLET, FILM COATED ORAL
Qty: 36 TABLET | Refills: 11 | Status: SHIPPED | OUTPATIENT
Start: 2024-11-13 | End: 2025-11-13

## 2024-11-12 NOTE — TELEPHONE ENCOUNTER
Pt was contacted pt was informed medication was not provided by Dr gambino.  ----- Message from [employer] sent at 11/12/2024  4:04 PM CST -----  Regarding: Self  Type: Patient Call Back     What is the request in detail: Pt stated her pharmacy has gemtesa RX and she wants to know if it came from Dr gambino     Can the clinic reply by [facility]? No     Would the patient rather a call back or a response via [facility]? Call back    Best call back number: .[phone]      Additional Information:    Thank you.

## 2024-11-12 NOTE — PROGRESS NOTES
"Infectious Disease Clinic Note  2024       Subjective:       Patient ID: Kylah Deal is a 74 y.o. female being seen for an new visit.    Chief Complaint: No chief complaint on file.    HPI  73y/o F pt with hx of T2DM, pulmonary sarcoid on MTX 15mg daily, CAD, HTN who was referred for pulm MAC.    Had started treatment for sarcoid, but was still having worsening cough and shortness of breath, so her pulmonologist decided to send afb cultures to [facility] for NTM.     No known hx of positive TB tests or known exposures.    Notes she was sick on 10/31 and was given 5 day course azithro.  Presented to ED on . CT concerning for progression of sarcoid. Denies signs or symptoms of systemic infection.     Reports she coughs every day throughout the day, it wakes her up from sleep.  Has been doing hypertonic saline nebs, which she reports does help her cough up phlegm. Has seen blood tinged sputum, but very rare.    AFB sputum 24, 24 and 24 grew MAC (S) Clarithro    CTA chest from 24 revealed "There is near complete right middle lobe atelectasis. There are extensive perilymphatic nodular and confluent opacities, mostly in the perihilar regions but present throughout the bilateral lungs. Findings have progressed in the left lower lobe and the anterior right upper lobe. "    QTc 422    Family History   Problem Relation Name Age of Onset    Lung cancer Father      Breast cancer Sister      Cancer Maternal Uncle      Cancer Paternal Aunt      Cancer Maternal Grandmother       Social History     Socioeconomic History    Marital status:    Tobacco Use    Smoking status: Former     Current packs/day: 0.00     Types: Cigarettes     Quit date:      Years since quittin.8    Smokeless tobacco: Never   Substance and Sexual Activity    Alcohol use: Yes     Alcohol/week: 1.0 standard drink of alcohol     Types: 1 Glasses of wine per week    Drug use: Never     Social Drivers of Health "     Financial Resource Strain: Low Risk  (5/22/2024)    Received from [facility]    Overall Financial Resource Strain (CARDIA)     Difficulty of Paying Living Expenses: Not hard at all   Food Insecurity: No Food Insecurity (5/22/2024)    Received from [facility]    Hunger Vital Sign     Worried About Running Out of Food in the Last Year: Never true     Ran Out of Food in the Last Year: Never true   Transportation Needs: No Transportation Needs (5/22/2024)    Received from [facility]    PRAPARE - Transportation     Lack of Transportation (Medical): No     Lack of Transportation (Non-Medical): No   Physical Activity: Inactive (5/22/2024)    Received from [facility]    Exercise Vital Sign     Days of Exercise per Week: 0 days     Minutes of Exercise per Session: 0 min   Stress: Stress Concern Present (5/22/2024)    Received from [facility]    [employer] - Occupational Stress Questionnaire     Feeling of Stress : To some extent   Housing Stability: Low Risk  (2/26/2024)    Housing Stability Vital Sign     Unable to Pay for Housing in the Last Year: No     Number of Places Lived in the Last Year: 1     Unstable Housing in the Last Year: No     Past Surgical History:   Procedure Laterality Date    APPENDECTOMY      CHOLECYSTECTOMY      CORONARY ANGIOPLASTY WITH STENT PLACEMENT  2017    CORONARY ARTERY BYPASS GRAFT      ENDOSCOPIC ULTRASOUND OF UPPER GASTROINTESTINAL TRACT N/A 04/03/2024    Procedure: ULTRASOUND, UPPER GI TRACT, ENDOSCOPIC;  Surgeon: Lavell Lomax MD;  Location: [facility];  Service: Endoscopy;  Laterality: N/A;  4/1 portal- EUS-guided biopsy of anastacia hepatis mass.    HEEL SPUR EXCISION  1997    HIP FRACTURE SURGERY Left 2018    HYSTERECTOMY      TONSILLECTOMY      URETEROSCOPIC REMOVAL OF URETERIC CALCULUS Right 10/3/2024    Procedure: REMOVAL, CALCULUS, URETER, URETEROSCOPIC Laser lithotripsy Retrograde pyelogram Ureteral stent placement;  Surgeon: Annamarie Lake,  MD;  Location: [facility] OR;  Service: Urology;  Laterality: Right;  holmium laser 365 madrigal, time 2:13, energy 0.76kj    URETHRAL SLING  2022    WRIST SURGERY Right 2005       Patient's Medications   New Prescriptions    No medications on file   Previous Medications    ALBUTEROL (PROVENTIL) 2.5 MG /3 ML (0.083 %) NEBULIZER SOLUTION    Take 3 mLs (2.5 mg total) by nebulization every 6 (six) hours as needed for Wheezing. Rescue    ALBUTEROL (PROVENTIL/VENTOLIN HFA) 90 MCG/ACTUATION INHALER    Inhale 2 puffs into the lungs every 4 (four) hours as needed.    ALBUTEROL-IPRATROPIUM (DUO-NEB) 2.5 MG-0.5 MG/3 ML NEBULIZER SOLUTION    Take 3 mLs by nebulization every 6 (six) hours as needed for Wheezing or Shortness of Breath. Rescue    ASPIRIN (ECOTRIN) 81 MG EC TABLET    Take 81 mg by mouth once daily.    CARVEDILOL (COREG) 12.5 MG TABLET    Take 1 tablet by mouth 2 (two) times daily with meals.    CEPHALEXIN (KEFLEX) 500 MG CAPSULE    Take 1 capsule (500 mg total) by mouth every 12 (twelve) hours. for 5 days    CLOPIDOGREL (PLAVIX) 75 MG TABLET    Take 1 tablet by mouth once daily.    DENOSUMAB (PROLIA) 60 MG/ML SYRG    Inject 60 mg into the skin every 6 (six) months.    EVOLOCUMAB (REPATHA SURECLICK SUBQ)    Inject into the skin.    EZETIMIBE (ZETIA) 10 MG TABLET    Take 10 mg by mouth once daily.    FLUTICASONE (VERAMYST) 27.5 MCG/ACTUATION NASAL SPRAY    2 sprays by Nasal route once daily.    FOLIC ACID (FOLVITE) 1 MG TABLET    Take 1 tablet (1 mg total) by mouth once daily.    FUROSEMIDE (LASIX) 20 MG TABLET    Take 20 mg by mouth 2 (two) times daily.    HYDRALAZINE (APRESOLINE) 50 MG TABLET    Take 1 tablet by mouth 2 (two) times daily.    INSULIN ASPART U-100 (NOVOLOG) 100 UNIT/ML (3 ML) INPN PEN    Inject 2 Units into the skin as needed.    LEVETIRACETAM (KEPPRA) 500 MG TAB    Take 1 tablet (500 mg total) by mouth 2 (two) times daily.    LINAGLIPTIN (TRADJENTA) 5 MG TAB TABLET    Take 5 mg by mouth once daily.     LISINOPRIL (PRINIVIL,ZESTRIL) 40 MG TABLET    Take 1 tablet by mouth once daily.    METHOTREXATE 2.5 MG TAB    Take 6 tablets (15 mg total) by mouth every 7 days.    MONTELUKAST (SINGULAIR) 10 MG TABLET    Take 10 mg by mouth.    NITROGLYCERIN (NITROSTAT) 0.4 MG SL TABLET    Place 0.4 mg under the tongue.    PANTOPRAZOLE (PROTONIX) 40 MG TABLET    Take 40 mg by mouth every morning.    SODIUM CHLORIDE 3% 3 % NEBULIZER SOLUTION    Take 4 mLs by nebulization 2 (two) times a day.    SOLIFENACIN (VESICARE) 10 MG TABLET    Take 1 tablet by mouth once daily.   Modified Medications    No medications on file   Discontinued Medications    No medications on file       Patient Active Problem List    Diagnosis Date Noted    Immunocompromised 10/31/2024    Right kidney stone 10/03/2024    Sarcoidosis of lung 06/13/2024    Thrush, oral 06/13/2024    Coronary artery disease involving native coronary artery of native heart without angina pectoris 03/26/2024    Type 2 diabetes mellitus without complication, without long-term current use of insulin 03/26/2024    HTN (hypertension) 03/26/2024    Age-related osteoporosis without current pathological fracture 03/26/2024    History of tobacco abuse 03/26/2024    Hypertensive heart disease with heart failure 03/26/2024     Noted by [facility]  last documented on [id number]      Convulsions, unspecified convulsion type 02/29/2024    Aortic atherosclerosis 02/29/2024    NSTEMI (non-ST elevated myocardial infarction) 01/24/2024    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   HENT:  Negative for congestion and sore throat.    Eyes:  Negative for blurred vision and double vision.   Respiratory:  Positive for cough, sputum production and shortness of breath. Negative for hemoptysis.    Cardiovascular:  Negative for chest pain and palpitations.   Gastrointestinal:  Negative for diarrhea and vomiting.   Musculoskeletal:  Negative for myalgias and neck pain.   Skin:  Negative for itching and rash.   Neurological:  Negative for dizziness and headaches.   Psychiatric/Behavioral:  Negative for substance abuse. The patient is not nervous/anxious.    All other systems reviewed and are negative.          Objective:      LMP  (LMP Unknown)   Estimated body mass index is 19.99 kg/m² as calculated from the following:    Height as of 11/6/24: 5' 1" (1.549 m).    Weight as of 11/7/24: 48 kg (105 lb 13.1 oz).    Physical Exam  Constitutional:       General: She is not in acute distress.     Appearance: She is well-developed.   HENT:      Head: Normocephalic and atraumatic.   Eyes:      Conjunctiva/sclera: Conjunctivae normal.      Pupils: Pupils are equal, round, and reactive to light.   Cardiovascular:      Rate and Rhythm: Normal rate and regular rhythm.      Heart sounds: Normal heart sounds.   Pulmonary:      Effort: Pulmonary effort is normal. No respiratory distress.      Breath sounds: Normal breath sounds. No wheezing.   Abdominal:      General: Bowel sounds are normal. There is no distension.      Palpations: Abdomen is soft.   Musculoskeletal:         General: Normal range of motion.      Cervical back: Normal range of motion and neck supple.   Skin:     General: Skin is warm and dry.   Neurological:      General: No focal deficit present.      Mental Status: She is alert and oriented to person, place, and time.      Cranial Nerves: No cranial " nerve deficit.   Psychiatric:         Mood and Affect: Mood normal.         Behavior: Behavior normal.       Assessment:         1. Sarcoidosis of lung  Ambulatory referral/consult to Infectious Disease            Plan:       Diagnoses and all orders for this visit:    Sarcoidosis of lung  on MTX      Pulmonary MAC    Meets IDSA criteria for diagnosis. Pt with 3 positive afb sputum cultures, symptoms and radiological findings characteristic of MAC.  Mild to moderate noncavitary nodular disease; candidate for 3 times weekly therapy    -Plan to start  Azithromycin, Ethambutol, Rifabutin   -Will avoid Rifampin due to DDI with plavix and methotrexate  -Reviewed med side effects   -Will monitor for drug toxicity with monthly cbc, cmp  -Duration of treatment is 12 mths from culture clearance. Pt to bring in sputum for monthly afb cx.  -counseled on the importance of airway clearance. Continue hypertonic saline neb bid and aerobika  -CT q 6 mths  - had recent eye exams; daily self-vision exams while on ethambutol    RTC in 1-2 mths  Donna Jimenez MD  Infectious Disease     Total professional time spent for the encounter: 60 minutes  Time was spent preparing to see the patient, reviewing results of prior testing, obtaining and/or reviewing separately obtained history, performing a medically appropriate examination and interview, counseling and educating the patient/family, ordering medications/tests/procedures, referring and communicating with other health care professionals, documenting clinical information in the electronic health record, and independently interpreting results.       No follow-ups on file.

## 2024-11-13 ENCOUNTER — PATIENT MESSAGE (OUTPATIENT)
Dept: ADMINISTRATIVE | Facility: HOSPITAL | Age: 74
End: 2024-11-13
Payer: MEDICARE

## 2024-11-20 ENCOUNTER — PATIENT OUTREACH (OUTPATIENT)
Dept: ADMINISTRATIVE | Facility: HOSPITAL | Age: 74
End: 2024-11-20
Payer: MEDICARE

## 2024-11-20 VITALS — DIASTOLIC BLOOD PRESSURE: 68 MMHG | SYSTOLIC BLOOD PRESSURE: 138 MMHG

## 2024-11-25 ENCOUNTER — LAB VISIT (OUTPATIENT)
Dept: LAB | Facility: HOSPITAL | Age: 74
End: 2024-11-25
Attending: INTERNAL MEDICINE
Payer: MEDICARE

## 2024-11-25 DIAGNOSIS — D47.2 MGUS (MONOCLONAL GAMMOPATHY OF UNKNOWN SIGNIFICANCE): ICD-10-CM

## 2024-11-25 DIAGNOSIS — R79.9 ABNORMAL FINDING OF BLOOD CHEMISTRY, UNSPECIFIED: ICD-10-CM

## 2024-11-25 LAB
ALBUMIN SERPL BCP-MCNC: 3.2 G/DL (ref 3.5–5.2)
ALP SERPL-CCNC: 84 U/L (ref 40–150)
ALT SERPL W/O P-5'-P-CCNC: 13 U/L (ref 10–44)
ANION GAP SERPL CALC-SCNC: 10 MMOL/L (ref 8–16)
AST SERPL-CCNC: 12 U/L (ref 10–40)
BASOPHILS # BLD AUTO: 0.01 K/UL (ref 0–0.2)
BASOPHILS NFR BLD: 0.1 % (ref 0–1.9)
BILIRUB SERPL-MCNC: 0.4 MG/DL (ref 0.1–1)
BUN SERPL-MCNC: 18 MG/DL (ref 8–23)
CALCIUM SERPL-MCNC: 9.9 MG/DL (ref 8.7–10.5)
CHLORIDE SERPL-SCNC: 105 MMOL/L (ref 95–110)
CO2 SERPL-SCNC: 26 MMOL/L (ref 23–29)
CREAT SERPL-MCNC: 1.2 MG/DL (ref 0.5–1.4)
DIFFERENTIAL METHOD BLD: ABNORMAL
EOSINOPHIL # BLD AUTO: 0 K/UL (ref 0–0.5)
EOSINOPHIL NFR BLD: 0.5 % (ref 0–8)
ERYTHROCYTE [DISTWIDTH] IN BLOOD BY AUTOMATED COUNT: 13.8 % (ref 11.5–14.5)
EST. GFR  (NO RACE VARIABLE): 47 ML/MIN/1.73 M^2
FERRITIN SERPL-MCNC: 210 NG/ML (ref 20–300)
GLUCOSE SERPL-MCNC: 171 MG/DL (ref 70–110)
HCT VFR BLD AUTO: 31 % (ref 37–48.5)
HGB BLD-MCNC: 9.5 G/DL (ref 12–16)
IGA SERPL-MCNC: 84 MG/DL (ref 40–350)
IGG SERPL-MCNC: 612 MG/DL (ref 650–1600)
IGM SERPL-MCNC: 92 MG/DL (ref 50–300)
IGM SERPL-MCNC: 92 MG/DL (ref 50–300)
IMM GRANULOCYTES # BLD AUTO: 0.05 K/UL (ref 0–0.04)
IMM GRANULOCYTES NFR BLD AUTO: 0.6 % (ref 0–0.5)
IRON SERPL-MCNC: 47 UG/DL (ref 30–160)
LDH SERPL L TO P-CCNC: 170 U/L (ref 110–260)
LYMPHOCYTES # BLD AUTO: 0.8 K/UL (ref 1–4.8)
LYMPHOCYTES NFR BLD: 9.5 % (ref 18–48)
MCH RBC QN AUTO: 28.2 PG (ref 27–31)
MCHC RBC AUTO-ENTMCNC: 30.6 G/DL (ref 32–36)
MCV RBC AUTO: 92 FL (ref 82–98)
MONOCYTES # BLD AUTO: 0.9 K/UL (ref 0.3–1)
MONOCYTES NFR BLD: 10.9 % (ref 4–15)
NEUTROPHILS # BLD AUTO: 6.4 K/UL (ref 1.8–7.7)
NEUTROPHILS NFR BLD: 78.4 % (ref 38–73)
NRBC BLD-RTO: 0 /100 WBC
PLATELET # BLD AUTO: 328 K/UL (ref 150–450)
PMV BLD AUTO: 10.6 FL (ref 9.2–12.9)
POTASSIUM SERPL-SCNC: 4.5 MMOL/L (ref 3.5–5.1)
PROT SERPL-MCNC: 7.1 G/DL (ref 6–8.4)
RBC # BLD AUTO: 3.37 M/UL (ref 4–5.4)
SATURATED IRON: 16 % (ref 20–50)
SODIUM SERPL-SCNC: 141 MMOL/L (ref 136–145)
TOTAL IRON BINDING CAPACITY: 290 UG/DL (ref 250–450)
TRANSFERRIN SERPL-MCNC: 196 MG/DL (ref 200–375)
URATE SERPL-MCNC: 2.6 MG/DL (ref 2.4–5.7)
WBC # BLD AUTO: 8.19 K/UL (ref 3.9–12.7)

## 2024-11-25 PROCEDURE — 86334 IMMUNOFIX E-PHORESIS SERUM: CPT | Mod: 26,HCNC,, | Performed by: PATHOLOGY

## 2024-11-25 PROCEDURE — 86334 IMMUNOFIX E-PHORESIS SERUM: CPT | Mod: HCNC | Performed by: INTERNAL MEDICINE

## 2024-11-25 PROCEDURE — 83615 LACTATE (LD) (LDH) ENZYME: CPT | Mod: HCNC | Performed by: INTERNAL MEDICINE

## 2024-11-25 PROCEDURE — 84165 PROTEIN E-PHORESIS SERUM: CPT | Mod: HCNC | Performed by: INTERNAL MEDICINE

## 2024-11-25 PROCEDURE — 84165 PROTEIN E-PHORESIS SERUM: CPT | Mod: 26,HCNC,, | Performed by: PATHOLOGY

## 2024-11-25 PROCEDURE — 82728 ASSAY OF FERRITIN: CPT | Mod: HCNC | Performed by: INTERNAL MEDICINE

## 2024-11-25 PROCEDURE — 84550 ASSAY OF BLOOD/URIC ACID: CPT | Mod: HCNC | Performed by: INTERNAL MEDICINE

## 2024-11-25 PROCEDURE — 83540 ASSAY OF IRON: CPT | Mod: HCNC | Performed by: INTERNAL MEDICINE

## 2024-11-25 PROCEDURE — 36415 COLL VENOUS BLD VENIPUNCTURE: CPT | Mod: HCNC | Performed by: INTERNAL MEDICINE

## 2024-11-25 PROCEDURE — 82784 ASSAY IGA/IGD/IGG/IGM EACH: CPT | Mod: 59,HCNC | Performed by: INTERNAL MEDICINE

## 2024-11-25 PROCEDURE — 83521 IG LIGHT CHAINS FREE EACH: CPT | Mod: 59,HCNC | Performed by: INTERNAL MEDICINE

## 2024-11-25 PROCEDURE — 80053 COMPREHEN METABOLIC PANEL: CPT | Mod: HCNC | Performed by: INTERNAL MEDICINE

## 2024-11-25 PROCEDURE — 85025 COMPLETE CBC W/AUTO DIFF WBC: CPT | Mod: HCNC | Performed by: INTERNAL MEDICINE

## 2024-11-26 LAB
ALBUMIN SERPL ELPH-MCNC: 3.38 G/DL (ref 3.35–5.55)
ALPHA1 GLOB SERPL ELPH-MCNC: 0.51 G/DL (ref 0.17–0.41)
ALPHA2 GLOB SERPL ELPH-MCNC: 1.18 G/DL (ref 0.43–0.99)
B-GLOBULIN SERPL ELPH-MCNC: 0.72 G/DL (ref 0.5–1.1)
GAMMA GLOB SERPL ELPH-MCNC: 0.61 G/DL (ref 0.67–1.58)
INTERPRETATION SERPL IFE-IMP: NORMAL
KAPPA LC SER QL IA: 1.64 MG/DL (ref 0.33–1.94)
KAPPA LC/LAMBDA SER IA: 1.07 (ref 0.26–1.65)
LAMBDA LC SER QL IA: 1.53 MG/DL (ref 0.57–2.63)
PATHOLOGIST INTERPRETATION IFE: NORMAL
PATHOLOGIST INTERPRETATION SPE: NORMAL
PROT SERPL-MCNC: 6.4 G/DL (ref 6–8.4)

## 2024-12-02 ENCOUNTER — OFFICE VISIT (OUTPATIENT)
Dept: HEMATOLOGY/ONCOLOGY | Facility: CLINIC | Age: 74
End: 2024-12-02
Payer: MEDICARE

## 2024-12-02 VITALS
BODY MASS INDEX: 20.31 KG/M2 | TEMPERATURE: 98 F | HEART RATE: 76 BPM | OXYGEN SATURATION: 99 % | SYSTOLIC BLOOD PRESSURE: 161 MMHG | RESPIRATION RATE: 17 BRPM | WEIGHT: 107.56 LBS | HEIGHT: 61 IN | DIASTOLIC BLOOD PRESSURE: 84 MMHG

## 2024-12-02 DIAGNOSIS — D84.9 IMMUNOCOMPROMISED: ICD-10-CM

## 2024-12-02 DIAGNOSIS — R79.9 ABNORMAL FINDING OF BLOOD CHEMISTRY, UNSPECIFIED: ICD-10-CM

## 2024-12-02 DIAGNOSIS — D71 HYPERCALCEMIA DUE TO GRANULOMATOUS DISEASE: ICD-10-CM

## 2024-12-02 DIAGNOSIS — E83.52 HYPERCALCEMIA DUE TO GRANULOMATOUS DISEASE: ICD-10-CM

## 2024-12-02 DIAGNOSIS — R97.1 ELEVATED CANCER ANTIGEN 125 (CA 125): ICD-10-CM

## 2024-12-02 DIAGNOSIS — D86.0 SARCOIDOSIS OF LUNG: ICD-10-CM

## 2024-12-02 DIAGNOSIS — D47.2 MGUS (MONOCLONAL GAMMOPATHY OF UNKNOWN SIGNIFICANCE): Primary | ICD-10-CM

## 2024-12-02 DIAGNOSIS — R04.0 EPISTAXIS: ICD-10-CM

## 2024-12-02 PROCEDURE — 99999 PR PBB SHADOW E&M-EST. PATIENT-LVL V: CPT | Mod: PBBFAC,HCNC,, | Performed by: INTERNAL MEDICINE

## 2024-12-02 PROCEDURE — 3079F DIAST BP 80-89 MM HG: CPT | Mod: HCNC,CPTII,S$GLB, | Performed by: INTERNAL MEDICINE

## 2024-12-02 PROCEDURE — 1126F AMNT PAIN NOTED NONE PRSNT: CPT | Mod: HCNC,CPTII,S$GLB, | Performed by: INTERNAL MEDICINE

## 2024-12-02 PROCEDURE — 3288F FALL RISK ASSESSMENT DOCD: CPT | Mod: HCNC,CPTII,S$GLB, | Performed by: INTERNAL MEDICINE

## 2024-12-02 PROCEDURE — 3077F SYST BP >= 140 MM HG: CPT | Mod: HCNC,CPTII,S$GLB, | Performed by: INTERNAL MEDICINE

## 2024-12-02 PROCEDURE — 1101F PT FALLS ASSESS-DOCD LE1/YR: CPT | Mod: HCNC,CPTII,S$GLB, | Performed by: INTERNAL MEDICINE

## 2024-12-02 PROCEDURE — 1159F MED LIST DOCD IN RCRD: CPT | Mod: HCNC,CPTII,S$GLB, | Performed by: INTERNAL MEDICINE

## 2024-12-02 PROCEDURE — 99215 OFFICE O/P EST HI 40 MIN: CPT | Mod: HCNC,S$GLB,, | Performed by: INTERNAL MEDICINE

## 2024-12-02 PROCEDURE — 1160F RVW MEDS BY RX/DR IN RCRD: CPT | Mod: HCNC,CPTII,S$GLB, | Performed by: INTERNAL MEDICINE

## 2024-12-02 NOTE — Clinical Note
Jesse Romero, I saw Mrs. Deal in onc clinic today. Her MGUS labs are stable so my gestalt is her imaging findings/adenopathy is from the sarcoid/MAC.  Ill continue to monitor her mgus labs in 6 months. If theres any discordance on future scans from a sarcoid standpoint, let me know if you'd like me to repeat imaging to follow up on the lymph nodes  Thanks!

## 2024-12-02 NOTE — PROGRESS NOTES
[facility]     Outpatient Medical Oncology Note  Patient: Kylah Deal  MRN: [id number]  Primary Care Provider: Osmar Cruz MD  Chief Complaint:  IgM MGUS     Subjective     Subjective:   HPI:   Kylah Deal is a 74 y.o. female with PMH significant for:   - Sarcoidosis (dx 4/2024, on mtx and prednisone)  - Pulmonary MAC (dx 11/2024, on azithromycin, ethambutol, rifabutin)  - CAD s/p CABG in 2000 and prior PCI (Plavix)  - DM2 (A1c: 6.8%, linagliptin and SSI)  - HTN (lisinopril)  - Possible COPD (PFTs - 2/8/24 - FEV1: 50-79%, mild restriction, decreased DLCO<40%)  - HTN  - Possible Seizure Disorder (Keppra)     She was originally followed by Medical Oncology as imaging in early 2024 performed in evaluation of hypercalcemia revealed a Enrrique Hepatic Mass, RML Pulmonary Mass, Adenopathy, and Bone Lesions. She underwent numerous biopsies including mediastinal node biopsy (3/1/24), enrrique hepatic mass biopsy (4/29/24) and bone marrow biopsy (4/29/24) revealing focal noncaseating granulomatous inflammation w/o overt malignancy, consistent with sarcoidosis. In evaluation, she was found to have an IgM lambda monoclonal protein and presents today to review follow up labs.     HPI:   1/2023: vague RUQ abdominal pain prompting a CT A/P - did not show biliary disease but did reveal opacities in right lung   1/2023: noted to be hypercalcemic (13) during routine labs through [facility] cardiology - hypercalcemia worsened in 1/2024 (14) prompting imaging below  1/13/2024: CT CAP w/ IV contrast: scattered pulmonary nodules/masses - largest is a 4.3 cm Right hilar/RML pulmonary mass w/ occlusion of the RML bronchus, mass in the enrrique hepatis (surrounds the portal vein, CBD dilation, extending into the RP surrounding the aorta/iliac and SMA; tumor does not appear to originate in the pancreas, could be conglomerate node or primary mass, numerous liver lesions c/f metastasis, mesenteric adenopathy, splenic  infarcts   1/31/23: Bronchoscopy: PATHOLOGY: RLL lung bx, LLL lung bx, Right mainstem bronchus, BAL, negative for carcinoma or granulomatous disease  2/20/2024: PET/CT: Hypermetabolic infiltrative mass in the anastacia hepatis/RP (superior/posterior to pancreas, SUV: 7.3, surrounding CBD), lungs, right hilum, mediastinum, pleura, pericardium, liver (small, SUV: 3.4), mesenteric, RP, and pelvic adenopathy, left SC (SUV: 3.7), right neck (SUV: 2.4, inferior to parotid), diffuse interstitial involvement in both lungs (interstitial thickening/nodularity, mildly thickened pleural w/ uptake), bone (right femoral neck, pelvis, spine, scapula, sternum, SUV: 5.8)  3/1/2024: CT guided core needle mediastinal lymph node biopsy - focal noncaseating granulomatous inflammation - results are discordant and remain concerning for malignancy.  3/18/24: CT chest: bilateral perihilar/peribronchial predominant irregular consolidations, stable, findings favored to represent sarcoidosis  3/27/24: Tumor Board: Recommend more tissue (EUS of PH mass vs. Bone) as findings in the lung may represent sarcoidosis but other lesions remain c/f malignancy  4/3/24: EUS: numerous enlarged nodes in middle paraesophageal mediastinum (8M), gastrohepatic ligament (18), and peripancreatic region - largest 1.3 cm, FNA performed (2 passes with 22 gauge needle); 3 cm mass in area of celiac/SMA takeoff (involved with arterial walls) - FNA performed of this mass as well  - no pathology in esophagus, stomach, duodenum, biliary tree, pancreas  PATHOLOGY: reactive histiocytic/myofibroblastic tissue with rare granulomas, no malignancy   4/29/24: Bone marrow biopsy:    PATHOLOGY: Cellular marrow with trilineage hematopoiesis and megakaryocytic hyperplasia, kappa restricted monotypic B lymphocytes (1.2%) and atypical plasma cells (<0.1%) detected by flow cytometric analysis, no amyloid, no granuloma; specimen is suboptimal  -- Comment: flow cytometry detects a kappa  light chain restricted b lymphocyte population (1.2% of total sample) expressing CD19 and CD20, MYD88 mutation analysis negative - although no definitive evidence of lymphoma or plasma cell neoplassm in this sample, b cell lymphoma with plasmacytic differentiation cannot be excluded    Labs:   2024  - CBC: unremarkable, CMP: Calcium: 11.9 (10.5), albumin: 3.6  - Smear: wnl, ferritin: 216, TSAT: 13%  - SPEP: M protein: 0.21 g/dL, IgM lambda monoclonal band, KF.36, ratio: 1.13  - Beta 2 microglobulin: 4.5, uric acid: 2.7, LDH: 148  - : 83, , CEA and AFP: wnl    Interval History:    Mrs. Deal is overall doing well.     Patient's current symptoms are:  (1) Productive cough: chronic x numerous years, intermittent exacerbations, improved on steroids for sarcoidosis   (2) Fatigue: chronic but progressive over   (3) MAHONEY: stable since early , no overt chest pain.   (4) HA: She has occasional headaches w/o focal neurologic deficits. HA's are mild but regular, no associated visual changes, N/V, etc.  Stable since last visit.   (5) Occasional nightsweats: no F/C/adenopathy  (6) Epistaxis: over the last several months, she has daily nose bleeds    Patient otherwise denies fevers, chills, chest pain, abdominal pain, N/V, diarrhea, constipation, weight loss, rashes.     Review of Systems:  14-point review of systems was asked with the pertinent positives and/or negatives stated in HPI.     Past Medical History:   - Sarcoidosis, Pulmonary MAC, CAD s/p CABG in  and prior PCI (Plavix), T2DM, HTN (lisinopril), COPD (PFTs - 24 - FEV1: 50-79%, mild restriction, decreased DLCO<40%), GERD, HLD, HTN, Seizure Disorder (Dx in , Keppra), Osteoporosis (on Prolia)    Past Surgical History:    Cholecystectomy, POLINA, appendectomy, Left hip allison    Family History:   - Dad: Lung Cancer (smoker)  - Sister: Breast Cancer (40s)  - Maternal Grandmother: Colon Cancer (70s)  - Paternal Aunt: Colon (50s)  - Maternal  Uncle: Cancer NOS    Social History:   Lives: [place]  Recently  (11/2023)  Children: Yes   Tobacco use: Former smoker - smoked from her 30s-50s, half pack per week, quit in 2008  Alcohol use: denies   Illicit drug use: denies    reports that she quit smoking about 16 years ago. Her smoking use included cigarettes. She has never used smokeless tobacco. She reports current alcohol use of about 1.0 standard drink of alcohol per week. She reports that she does not use drugs.     Allergies:  Review of patient's allergies indicates:   Allergen Reactions    Atorvastatin calcium Other (See Comments)     Pain    Metformin Diarrhea     Diarrhea and cramping    Rosuvastatin Other (See Comments)     pain    Statins-hmg-coa reductase inhibitors        Medications:  Current Outpatient Medications   Medication Instructions    albuterol (PROVENTIL) 2.5 mg, Nebulization, Every 6 hours PRN, Rescue    albuterol (PROVENTIL/VENTOLIN HFA) 90 mcg/actuation inhaler 2 puffs, Every 4 hours PRN    albuterol-ipratropium (DUO-NEB) 2.5 mg-0.5 mg/3 mL nebulizer solution 3 mLs, Nebulization, Every 6 hours PRN, Rescue    aspirin (ECOTRIN) 81 mg, Daily    azithromycin (ZITHROMAX) 500 mg, Oral, Every Mon, Wed, Fri    carvediloL (COREG) 12.5 MG tablet 1 tablet, 2 times daily with meals    clopidogreL (PLAVIX) 75 mg tablet 1 tablet, Daily    ethambutoL (MYAMBUTOL) 1,200 mg, Oral, Every Mon, Wed, Fri    evolocumab (REPATHA SURECLICK SUBQ) Inject into the skin.    ezetimibe (ZETIA) 10 mg, Daily    fluticasone (VERAMYST) 27.5 mcg/actuation nasal spray 2 sprays, Daily    folic acid (FOLVITE) 1 mg, Oral, Daily    furosemide (LASIX) 20 mg, 2 times daily    hydrALAZINE (APRESOLINE) 50 MG tablet 1 tablet, 2 times daily    insulin aspart U-100 (NOVOLOG) 2 Units, As needed (PRN)    levETIRAcetam (KEPPRA) 500 mg, Oral, 2 times daily    lisinopriL (PRINIVIL,ZESTRIL) 40 MG tablet 1 tablet, Daily    methotrexate 15 mg, Oral, Every 7 days    montelukast  "(SINGULAIR) 10 mg    nitroGLYCERIN (NITROSTAT) 0.4 mg    pantoprazole (PROTONIX) 40 mg, Every morning    PROLIA 60 mg, Every 6 months    rifabutin (MYCOBUTIN) 300 mg, Oral, Every Mon, Wed, Fri    sodium chloride 3% 3 % nebulizer solution 4 mLs, Nebulization, 2 times daily    solifenacin (VESICARE) 10 MG tablet 1 tablet, Daily    TRADJENTA 5 mg, Daily          Objective:   Vitals:   Vitals:    12/02/24 0740   BP: (!) 161/84   BP Location: Left arm   Patient Position: Sitting   Pulse: 76   Resp: 17   Temp: 98.1 °F (36.7 °C)   TempSrc: Oral   SpO2: 99%   Weight: 48.8 kg (107 lb 9.4 oz)   Height: 5' 1" (1.549 m)       BMI: Body mass index is 20.33 kg/m².  ECOG Performance Status: 1    Physical Exam   General Appearance:    Alert, cooperative, no distress    Head:    Normocephalic, without obvious abnormality, atraumatic   Throat:   Lips, mucosa, and tongue normal; teeth and gums normal   Neck:   Supple, symmetrical, no adenopathy;     thyroid:  no enlargement/tenderness/nodules   Lungs:     Clear to auscultation bilaterally, respirations unlabored    Heart:    Regular rate and rhythm, S1 and S2 normal   Abdomen:     Soft, non-tender, bowel sounds active, no masses, no organomegaly   Extremities:   Extremities normal, atraumatic, no cyanosis or edema   Pulses:   2+ and symmetric all extremities   Skin:   Skin color, texture, turgor normal, no rashes or lesions   Lymph nodes:   Cervical, supraclavicular, and axillary nodes normal   Neurologic:   CNII-XII intact, normal strength, sensation               Laboratory Data:  No visits with results within 1 Week(s) from this visit.   Latest known visit with results is:   Lab Visit on 11/25/2024   Component Date Value Ref Range Status    WBC 11/25/2024 8.19  3.90 - 12.70 K/uL Final    RBC 11/25/2024 3.37 (L)  4.00 - 5.40 M/uL Final    Hemoglobin 11/25/2024 9.5 (L)  12.0 - 16.0 g/dL Final    Hematocrit 11/25/2024 31.0 (L)  37.0 - 48.5 % Final    MCV 11/25/2024 92  82 - 98 fL " Final    MCH 11/25/2024 28.2  27.0 - 31.0 pg Final    MCHC 11/25/2024 30.6 (L)  32.0 - 36.0 g/dL Final    RDW 11/25/2024 13.8  11.5 - 14.5 % Final    Platelets 11/25/2024 328  150 - 450 K/uL Final    MPV 11/25/2024 10.6  9.2 - 12.9 fL Final    Immature Granulocytes 11/25/2024 0.6 (H)  0.0 - 0.5 % Final    Gran # (ANC) 11/25/2024 6.4  1.8 - 7.7 K/uL Final    Immature Grans (Abs) 11/25/2024 0.05 (H)  0.00 - 0.04 K/uL Final    Comment: Mild elevation in immature granulocytes is non specific and   can be seen in a variety of conditions including stress response,   acute inflammation, trauma and pregnancy. Correlation with other   laboratory and clinical findings is essential.      Lymph # 11/25/2024 0.8 (L)  1.0 - 4.8 K/uL Final    Mono # 11/25/2024 0.9  0.3 - 1.0 K/uL Final    Eos # 11/25/2024 0.0  0.0 - 0.5 K/uL Final    Baso # 11/25/2024 0.01  0.00 - 0.20 K/uL Final    nRBC 11/25/2024 0  0 /100 WBC Final    Gran % 11/25/2024 78.4 (H)  38.0 - 73.0 % Final    Lymph % 11/25/2024 9.5 (L)  18.0 - 48.0 % Final    Mono % 11/25/2024 10.9  4.0 - 15.0 % Final    Eosinophil % 11/25/2024 0.5  0.0 - 8.0 % Final    Basophil % 11/25/2024 0.1  0.0 - 1.9 % Final    Differential Method 11/25/2024 Automated   Final    Sodium 11/25/2024 141  136 - 145 mmol/L Final    Potassium 11/25/2024 4.5  3.5 - 5.1 mmol/L Final    Chloride 11/25/2024 105  95 - 110 mmol/L Final    CO2 11/25/2024 26  23 - 29 mmol/L Final    Glucose 11/25/2024 171 (H)  70 - 110 mg/dL Final    BUN 11/25/2024 18  8 - 23 mg/dL Final    Creatinine 11/25/2024 1.2  0.5 - 1.4 mg/dL Final    Calcium 11/25/2024 9.9  8.7 - 10.5 mg/dL Final    Total Protein 11/25/2024 7.1  6.0 - 8.4 g/dL Final    Albumin 11/25/2024 3.2 (L)  3.5 - 5.2 g/dL Final    Total Bilirubin 11/25/2024 0.4  0.1 - 1.0 mg/dL Final    Comment: For infants and newborns, interpretation of results should be based  on gestational age, weight and in agreement with clinical  observations.    Premature Infant  recommended reference ranges:  Up to 24 hours.............<8.0 mg/dL  Up to 48 hours............<12.0 mg/dL  3-5 days..................<15.0 mg/dL  6-29 days.................<15.0 mg/dL      Alkaline Phosphatase 11/25/2024 84  40 - 150 U/L Final    AST 11/25/2024 12  10 - 40 U/L Final    ALT 11/25/2024 13  10 - 44 U/L Final    eGFR 11/25/2024 47 (A)  >60 mL/min/1.73 m^2 Final    Anion Gap 11/25/2024 10  8 - 16 mmol/L Final    IgM 11/25/2024 92  50 - 300 mg/dL Final    IgM Cord Blood Reference Range: <25 mg/dL.    IgG 11/25/2024 612 (L)  650 - 1600 mg/dL Final    IgG Cord Blood Reference Range: 650-1600 mg/dL.    IgA 11/25/2024 84  40 - 350 mg/dL Final    IgA Cord Blood Reference Range: <5 mg/dL.    IgM 11/25/2024 92  50 - 300 mg/dL Final    IgM Cord Blood Reference Range: <25 mg/dL.    Protein, Serum 11/25/2024 6.4  6.0 - 8.4 g/dL Final    Comment: Serum protein electrophoresis and immunofixation results should be   interpreted in clinical context in that some therapeutic agents can   result   in false positive results (example, daratumumab). Correlation with   the   patient s therapeutic regimen is required.      Albumin 11/25/2024 3.38  3.35 - 5.55 g/dL Final    Alpha-1 11/25/2024 0.51 (H)  0.17 - 0.41 g/dL Final    Alpha-2 11/25/2024 1.18 (H)  0.43 - 0.99 g/dL Final    Beta 11/25/2024 0.72  0.50 - 1.10 g/dL Final    Gamma 11/25/2024 0.61 (L)  0.67 - 1.58 g/dL Final    Immunofix Interp. 11/25/2024 SEE COMMENT   Final    Comment: Serum protein electrophoresis and immunofixation results should be   interpreted in clinical context in that some therapeutic agents can   result   in false positive results (example, daratumumab). Correlation with   the   patient s therapeutic regimen is required.  See pathologist's interpretation.      [place] Free Light Chains 11/25/2024 1.64  0.33 - 1.94 mg/dL Final    Lambda Free Light Chains 11/25/2024 1.53  0.57 - 2.63 mg/dL Final    Kappa/Lambda FLC Ratio 11/25/2024 1.07  0.26 - 1.65  Final    Comment: Undetected antigen excess is a rare event but cannot   be excluded. If these free light chain results do not   agree with other clinical or laboratory findings or   if the sample is from a patient that has previously   demonstrated antigen excess, discuss with the testing   laboratory.   Results should always be interpreted in conjunction   with other laboratory tests and clinical evidence.      Ferritin 2024 210  20.0 - 300.0 ng/mL Final    Iron 2024 47  30 - 160 ug/dL Final    Transferrin 2024 196 (L)  200 - 375 mg/dL Final    TIBC 2024 290  250 - 450 ug/dL Final    Saturated Iron 2024 16 (L)  20 - 50 % Final    LD 2024 170  110 - 260 U/L Final    Results are increased in hemolyzed samples.    Uric Acid 2024 2.6  2.4 - 5.7 mg/dL Final    Pathologist Interpretation GRACE 2024 REVIEWED   Final    Comment:   Electronically reviewed and signed by:  Telma Romero MD  Signed on 24 at 14:29  Faint IgM lambda specific monoclonal band present.       Pathologist Interpretation SPE 2024 REVIEWED   Final    Comment:   Electronically reviewed and signed by:  Telma Romero MD  Signed on 24 at 14:29  Normal total protein.  Decreased gamma globulins.    No discrete paraprotein band identified for quantification.       Recent Labs   Lab Result Units 10/15/24  1026 24  1926 24  1035   WBC K/uL 9.34 6.96 8.19   Hemoglobin g/dL 10.1* 9.7* 9.5*   Hematocrit % 32.2* 30.3* 31.0*   Platelets K/uL 308 399 328     Recent Labs   Lab Result Units 24  1926 24  0835 24  1035   Creatinine mg/dL 1.1 1.28* 1.2   AST U/L 16 11 12   ALT U/L 10 9 13     Recent Labs   Lab Result Units 24  1035   Iron ug/dL 47   Ferritin ng/mL 210        Imagin/10/2024: CT CAP: stable adenopathy - retrocrural and upper abdominal adenopathy extending to PH, stable soft tissue thickening with partial calcification around pulmonary  "arteries/mediastinum, extensive perilymphatic nodular and confluent lung opacities, stable left pleural thickening, innumerable subcm hepatic hypodensities too small to characterize  - other: stable mild intrahepatic biliary dilation, atherosclerosis, R nephrolithiasis - stable 1 cm staghorn kidney stone with proximal R hydroureter, mild left sided urothelial wall thickening    1/13/2024: CT CAP w/ IV contrast: scattered pulmonary nodules - largest is a 4.3 cm Right hilar/RML pulmonary mass w/ occlusion of the RML bronchus, mass in the anastacia hepatis (surrounds the portal vein, CBD dilation, extending into the RP surrounding the aorta/iliac and SMA; tumor does not appear to originate in the pancreas, could be conglomerate node or primary mass, numerous liver lesions c/f metastasis, mesenteric adenopathy, splenic infarcts     2/20/2024: PET/CT: Hypermetabolic infiltrative mass in the anastacia hepatis/RP (superior/posterior to pancreas, SUV: 7.3, surrounding CBD), lungs, right hilum, mediastinum, pleura, pericardium, liver (small, SUV: 3.4), mesenteric, RP, and pelvic adenopathy, left SC (SUV: 3.7), right neck (SUV: 2.4, inferior to parotid), diffuse interstitial involvement in both lungs (interstitial thickening/nodularity, mildly thickened pleural w/ uptake), bone (right femoral neck, pelvis, spine, scapula, sternum, SUV: 5.8)    3/18/24: CT chest w/o contrast: stable bilateral perihilar/peribronchial predominant irregular consolidations, mediastinal irregular peripheral soft tissue densities, small left pleural effusion, findings favored to represent sarcoidosis    Pathology:   3/1/2024: CT guided core needle mediastinal lymph node biopsy - focal noncaseating granulomatous inflammation, "one of the fragments show a few small non-caseating granulomas, no evidence of malignancy"    4/3/24: EUS: numerous enlarged nodes in middle paraesophageal mediastinum (8M), gastrohepatic ligament (18), and peripancreatic region - " largest 1.3 cm, FNA performed (2 passes with 22 gauge needle); 3 cm mass in area of celiac/SMA takeoff (involved with arterial walls) - FNA performed of this mass as well  - no pathology in esophagus, stomach, duodenum, biliary tree, pancreas  PATHOLOGY: reactive histiocytic/myofibroblastic tissue with rare granulomas, no malignancy     24:   Bone marrow biopsy:  Cellular marrow with trilineage hematopoiesis and megakaryocytic hyperplasia, kappa restricted monotypic B lymphocytes (1.2%) and atypical plasma cells (<0.1%) detected by flow cytometric analysis, no amyloid, no granuloma; specimen is suboptimal  -- Comment: flow cytometry detects a kappa light chain restricted b lymphocyte population (1.2% of total sample) expressing CD19 and CD20, MYD88 mutation analysis negative - although no definitive evidence of lymphoma or plasma cell neoplassm in this sample, b cell lymphoma with plasmacytic differentiation cannot be excluded    Labs:   2024  - CBC: wnl CMP: Calcium: 11.9 (10.5), albumin: 3.6, Smear: wnl, ferritin: 216, TSAT: 13%  - SPEP: M protein: 0.21 g/dL, IgM lambda monoclonal band, KF.36, ratio: 1.13  - Beta 2 microglobulin: 4.5, uric acid: 2.7, LDH: 148  - : 83, , CEA and AFP: wnl  - HIV and hepatitis testing: negative    2024   - CBC: WBC: 8, Hgb: 9.5, MCV: 92, platelets: 328  - CMP: glucose: 171, Cr: 1.2, GFR: 47  - SPEP: no monoclonal protein detected for quantification, GRACE: faint IgM lambda monoclonal band present  - KF.64, LF.53, Ratio: 1.07  - Ig, IgA: 94, IgM: 92  - LDH: 170  - TSAT: 16% (13%), ferritin: 210      Assessment   Assessment and Plan:   Kylah Deal is a 74 y.o. female former smoker with Sarcoidosis (dx 2024, on mtx, prednisone), pulmonary MAC (dx 2024, on abx), CAD s/p CABG, DM2, HTN, COPD, HTN, Possible Seizure Disorder (Keppra). She was originally followed by Medical Oncology as imaging in early  performed in evaluation of  hypercalcemia revealed numerous abnormalities including a Enrrique Hepatic Mass, RML Pulmonary Mass, Adenopathy, and Bone Lesions. She underwent numerous biopsies including mediastinal node biopsy (3/1/24), enrrique hepatic mass biopsy (24) and bone marrow biopsy (24) revealing focal noncaseating granulomatous inflammation w/o overt malignancy, consistent with sarcoidosis. Given bone lesions, SPEP performed and IgM lambda monoclonal protein detected. She presents today to review follow up labs.     # IgM MGUS  - SPEP: M protein: 0.21 g/dL, IgM lambda monoclonal band, KF.36, ratio: 1.13; stable on Nov labs  - Bone marrow biopsy in 2024 without overt evidence of lymphoma or plasma cell neoplasm. However, there is note of kappa restricted monotypic B lymphocytes (1.2%) and atypical plasma cells, MYD88 mutational analysis negative but LPL or Waldenstrom macroglobulinemia, (indolent form of lymphoma) cannot be excluded based on current limited sample    - History and findings on imaging most consistent with sarcoidosis in light of extensive negative malignancy workup; however, given extrapulmonary disease/adenopathy and results of bone marrow biopsy above, discussed possibility of an indolent lymphoproliferative disorder (LPL)   - 2024 labs stable   - CTM with MGUS labs in 6 months   - CTA chest on 24 with worsening soft tissue thickening in the mediastinum/perilymphatic nodularity in LLL and RUL c/f worsening sarcoidosis. Dicussed with pulmonary, Dr. Romero, that if imaging discordant in the future (particularly worsening adenopathy compared to pulmonary disease), would need updated malignancy workup    # Other Co-Morbidities:  - Hypercalcemia: 2/2 sarcoidosis, s/p denosumab in 2024, CTM, per pulmonary   - Normocytic anemia: likely ACD in light of above, CTM  - Epistaxis: ENT referral  - Sarcoidosis: on mtx and prednisone, followed closely by pulmonary  - Pulmonary MAC: on azithromycin,  ethambutol, rifabutin, per ID   - Nephrolithiasis: noted on CT 2024, s/p lithotripsy and ureter stent placement 10/2024, per urology  - CAD s/p CABG (Plavix), DM2 (linagliptin), HTN (lisinopril), COPD (PFTs - 24 - FEV1: 50-79%, mild restriction, decreased DLCO<40%), HTN, Possible Seizure Disorder (Keppra): stable on current regimen, per PCP (outside of [facility])  - Genetics: pending above, consider genetic testing given strong FH  - Other Cancer Screening: Colonoscopy: 3/22/2019, [id number], Pap: POLINA in     Follow Up:  - RTC in 6 months with labs one week prior  [] upep/hemal, genetics, , tsat, anemia, prolia         Med Onc Chart Routing      Follow up with physician . RTC in May 2025 with labs one week prior   Follow up with [facility]    Infusion scheduling note    Injection scheduling note    Labs    Imaging    Pharmacy appointment    Other referrals              MDM includes:    - Acute or chronic illness or injury that poses a threat to life or bodily function  - Consideration and discussion of significant complications based on comorbidities  - Review of prior external notes from unique source and review of diagnostic tests and information  - Independent review and explanation of 3+ results from unique tests   - Discussion of management and ordering 3+ unique tests   - Extensive discussion of treatment and management including consideration of possible diagnoses and management options  - Prescription drug management  - Drug therapy requiring intensive monitoring for toxicity    - Patient seen in diagnosis clinic.   - Overall, I discussed the diagnosis, history, stage, labs/imaging, prognosis, management, and treatment plan as applicable. I reviewed adverse short and long term effects as applicable.   - Informed patient if symptoms are getting worse that it is their responsibility to call the clinic and schedule follow up sooner than stated follow up. Also informed patient if they do not hear from the  appointment center in 2-5 business days for their referrals, the patient must call the Oncology clinic so we can follow up on procedures or referral scheduling. Patient was fully informed of current medical plan, all questions were answered and patient verbalized understanding. No further questions.   - Face to Face Visit time I spent with the patient: 40 minutes of total time spent on the encounter, including counseling patient and/or coordinating care, which includes face to face time and non-face to face time preparing to see the patient (eg, review of tests), Obtaining and/or reviewing separately obtained history, Documenting clinical information in the electronic or other health record, Independently interpreting results (not separately reported) and communicating results to the patient/family/caregiver, or Care coordination (not separately reported).     Signed   Karla Sanchez MD  [facility]

## 2024-12-03 ENCOUNTER — OFFICE VISIT (OUTPATIENT)
Dept: PULMONOLOGY | Facility: CLINIC | Age: 74
End: 2024-12-03
Payer: MEDICARE

## 2024-12-03 ENCOUNTER — HOSPITAL ENCOUNTER (OUTPATIENT)
Dept: PULMONOLOGY | Facility: CLINIC | Age: 74
Discharge: HOME | End: 2024-12-03
Payer: MEDICARE

## 2024-12-03 VITALS
BODY MASS INDEX: 20.47 KG/M2 | HEART RATE: 85 BPM | WEIGHT: 108.44 LBS | HEIGHT: 61 IN | SYSTOLIC BLOOD PRESSURE: 118 MMHG | DIASTOLIC BLOOD PRESSURE: 72 MMHG | OXYGEN SATURATION: 94 %

## 2024-12-03 VITALS — HEIGHT: 62 IN | WEIGHT: 107 LBS | BODY MASS INDEX: 19.69 KG/M2

## 2024-12-03 DIAGNOSIS — D86.0 SARCOIDOSIS OF LUNG: ICD-10-CM

## 2024-12-03 DIAGNOSIS — A31.0 MYCOBACTERIUM AVIUM COMPLEX: Primary | ICD-10-CM

## 2024-12-03 PROCEDURE — 3288F FALL RISK ASSESSMENT DOCD: CPT | Mod: HCNC,CPTII,S$GLB, | Performed by: INTERNAL MEDICINE

## 2024-12-03 PROCEDURE — 3078F DIAST BP <80 MM HG: CPT | Mod: HCNC,CPTII,S$GLB, | Performed by: INTERNAL MEDICINE

## 2024-12-03 PROCEDURE — 1101F PT FALLS ASSESS-DOCD LE1/YR: CPT | Mod: HCNC,CPTII,S$GLB, | Performed by: INTERNAL MEDICINE

## 2024-12-03 PROCEDURE — 1159F MED LIST DOCD IN RCRD: CPT | Mod: HCNC,CPTII,S$GLB, | Performed by: INTERNAL MEDICINE

## 2024-12-03 PROCEDURE — 3074F SYST BP LT 130 MM HG: CPT | Mod: HCNC,CPTII,S$GLB, | Performed by: INTERNAL MEDICINE

## 2024-12-03 PROCEDURE — 99214 OFFICE O/P EST MOD 30 MIN: CPT | Mod: HCNC,25,S$GLB, | Performed by: INTERNAL MEDICINE

## 2024-12-03 PROCEDURE — 1125F AMNT PAIN NOTED PAIN PRSNT: CPT | Mod: HCNC,CPTII,S$GLB, | Performed by: INTERNAL MEDICINE

## 2024-12-03 PROCEDURE — 94618 PULMONARY STRESS TESTING: CPT | Mod: HCNC,S$GLB,, | Performed by: INTERNAL MEDICINE

## 2024-12-03 PROCEDURE — 1160F RVW MEDS BY RX/DR IN RCRD: CPT | Mod: HCNC,CPTII,S$GLB, | Performed by: INTERNAL MEDICINE

## 2024-12-03 PROCEDURE — 99999 PR PBB SHADOW E&M-EST. PATIENT-LVL V: CPT | Mod: PBBFAC,HCNC,, | Performed by: INTERNAL MEDICINE

## 2024-12-03 RX ORDER — METHOTREXATE 2.5 MG/1
15 TABLET ORAL
Qty: 72 TABLET | Refills: 0 | Status: SHIPPED | OUTPATIENT
Start: 2024-12-03 | End: 2025-03-03

## 2024-12-03 RX ORDER — PREDNISONE 10 MG/1
5 TABLET ORAL DAILY
Qty: 100 TABLET | Refills: 0 | Status: SHIPPED | OUTPATIENT
Start: 2024-12-03

## 2024-12-03 NOTE — PROCEDURES
Kylah Deal is a 74 y.o.   female patient, who presents for a 6 minute walk test ordered by MD Nick.  The diagnosis is Qualify for Oxygen; Sarcoidosis.  The patient's BMI is 19.6 kg/m2.  Predicted distance (lower limit of normal) is 312.62 meters.      Test Results:    The test was completed without stopping.  The total time walked was 360 seconds.  During walking, the patient reported:  Dyspnea.  The patient used no assistive devices during testing.     12/03/2024---------Distance: 354.18 meters (1162 feet)     O2 Sat % Supplemental Oxygen Heart Rate Blood Pressure Diamond Scale   Pre-exercise  (Resting) 99 % Room Air 86 bpm 153/72 mmHg 1   During Exercise 97 % Room Air 107 bpm 168/74 mmHg 4   Post-exercise  (Recovery) 98 % Room Air  99 bpm       Recovery Time: 58 seconds    Performing nurse/tech: [employer] RRT      PREVIOUS STUDY:   The patient has not had a previous study.      CLINICAL INTERPRETATION:  Six minute walk distance is 354.18 meters (1162 feet) with somewhat heavy dyspnea.  During exercise, there was no significant desaturation while breathing room air.  Both blood pressure and heart rate remained stable with walking.  Hypertension was present prior to exercise.  The patient did not report non-pulmonary symptoms during exercise.  No previous study performed.  Based upon age and body mass index, exercise capacity is normal.

## 2024-12-03 NOTE — PROGRESS NOTES
"  Subjective:   Patient ID: Kylah Deal is a 74 y.o. female    Chief Complaint:   No chief complaint on file.    HPI  75 yo female with history of Sarcoidosis. She is established with pulmonary and previously followed with Dr. Briggs, last visit on June 13, 2024 and by me September 27, 2024. Other PMH includes former smoke (half pack per week quit age 58), CAD and DMII.     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  She was started on a regimen of Azithromycin, Ethambutol, and Rifabutin by Infectious Diseases (ID) during her initial visit on 11/12/24.  She reports that her symptoms are variable depending on steroid use. She is currently on methotrexate (15 mg on Mondays) and takes folic acid as part of her treatment regimen. Previous attempts to wean off steroids have resulted in increased fatigue and shortness of breath. She described feeling "crummy" when off steroids. After a phone consultation on 11/7/24, she was prescribed steroids, which she started at 20 mg daily for several days and has since tapered to 10 mg daily. She reports significant improvement while on steroids.  She has gained a few pounds since her last visit.      9/27/24:  Reports having had URI symptoms 3 weeks ago requiring evaluation at . Was told it was non-allergic rhinitis and given Mucinex. She is starting to feel better from that. She has chronic cough, productive of green phlegm but now clearing. Did experience night sweats twice in the interval since last visit. Dyspnea and fatigue are better. She went to [place] on a vacation and was able to do some hiking. She is taking Prednisone 10 mg daily and Methotrexate 10 mg weekly, which takes on a Monday. Her Ca level was 12.6 on 8/16/24, better since then most recently 10.1 on " 9/25/24. She is avoiding Vitamin D and spinach. She is planned for ureteroscopy and stone removal on 10/3/24. Glucose control has been difficult . Typically high after steroids and has periods of hypoglycemia to as low as 40. She is working with endocrinologist to make adjustments accordingly.    8/16/24  Her diagnosis of Sarcoidosis started with abnormally high calcium level on blood work, leading to CT thorax with bilateral perihilar and peribronchial irregular interstitial opacities. She initially underwent bronchoscopy with tbbx at outside hospital in January 2024, but negative for granulomatous disease and malignancy. A (mediastinal) lymph node biopsy with performed with IR on March 1, 2024, resulted in non-caseating granuloma. She was seen by oncology at [facility], and presented at Tumor Board, with recommendation for further sampling. She then underwent a EUS on 4/3/24 with finding of enlarged lymph nodes in middle paraesophageal mediastinum, biopsied with finding of rare granulomas.    Her symptoms included shortness of breath with exertion, cough and significant fatigue. She also experienced night sweats. She was started on Prednisone initially at 30 mg at the end of May 2024, which made her feel great, but this led to uncontrolled glycemic index and difficulty with adjusting her novolog. She was seen in a clinic visit on June 13th 2024, at which time she was recommended to change to MTX as steroid sparring therapy - she started at MTX 2.5 mg weekly (first dose on June 17th) and increased to 5 mg on July 1st. She was instructed to wean off steroids from 15 mg every 5 days and completed steroids on July 3rd 2024. Her fatigue recurred during the wean and persisted off steroids and was is instructed to resume steroids on July 18th, 2024 at 10 mg. Currently her Methotrexate is up to maintenance dose of 10 mg weekly since July 29th. She is taking folic acid daily.    Currently, on MTX 10 mg weekly and Prednisone 10  mg, she does feel better, but still experiencing fatigue which is the most cumbersome symptom. Her glucose control is better, but still experiencing fluctuation in glucose levels. She will be seeing endocrinologist next week. She believes a higher dose would make her feel more energy.    She denies any nausea, vomiting, gastrointestinal symptoms. No abdominal pain.     She experiences brain fogginess for an hour after steroids. Appetite is good. Does have some blurry vision. Has not been able to see ophthalmologist since diagnosis of Sarcoidosis.    Of note, her sputum culture from 24 is positive for Mycobacterium species - still in processing     She follows with oncologist, Dr. Sanchez. Myeloma labs were performed, and found to have monoclonal protein. A bone marrow biopsy was then performed and there is some concern for possible indolent LPL. She will be following with Dr. Sanchez later in the year.      History:  Former smoker. Stopped in .  1/2ppw age [age]   No known occupational exposures.  Father was a   Dad was a heavy smoker- 3ppd;  of lung cancer  Mother with COPD      Imaging:  CT May 10, 2024:  Retroperitoneum: Retrocrural and bilateral upper abdominal para-aortic adenopathy appears similar to the prior exam with adenopathy extending into the anastacia hepatis.  Vasculature: Severe atherosclerosis of the abdominal aorta and its branches.  Bladder: No evidence of wall thickening.  Reproductive organs: Hysterectomy suspected.  Bones: No suspicious lytic or blastic lesions.  Impression:  Stable appearance of the chest abdomen pelvis with mediastinal, retrocrural, and retroperitoneal confluent adenopathy and extensive Svetlana lymphatic nodular and confluent bilateral lung opacities.  Findings that can be seen in patient's provided diagnosis of sarcoidosis and correlation with tissue diagnosis, if not already performed is suggested.  Lymphoproliferative disorder could conceivably have a similar  appearance but thought less likely     It is difficult to measure discrete lesions according to recist criteria.     Stable mild intrahepatic biliary dilatation     Severe atherosclerosis     Right nephrolithiasis, proximal right hydroureter and bilateral urothelial wall thickening suggesting chronic inflammation.     Lesion 1: organ (max 2 lesions per organ). current measurement - long axis for mass (minimum baseline 1 cm), short axis for node (minimum baseline 1.5) cm. Series  Image . Prior measurement  cm.     Lesion 2: organ (max 2 lesions per organ). current measurement - long axis for mass (minimum baseline 1 cm), short axis for node (minimum baseline 1.5) cm. Series  Image . Prior measurement  cm.     Lesion 3: organ (max 2 lesions per organ). current measurement - long axis for mass (minimum baseline 1 cm), short axis for node (minimum baseline 1.5) cm. Series  Image . Prior measurement  cm.     Lesion 4: organ (max 2 lesions per organ). current measurement - long axis for mass (minimum baseline 1 cm), short axis for node (minimum baseline 1.5) cm. Series  Image . Prior measurement  cm.     Lesion 5: organ (max 2 lesions per organ). current measurement - long axis for mass (minimum baseline 1 cm), short axis for node (minimum baseline 1.5) cm. Series  Image . Prior measurement  cm.    Path:  EUS 4/3/24:  1 and 2.  Svetlana-gastric mass, endoscopic ultrasound-guided (EUS) biopsies with pathologist adequacy:   Parts 1 and 2 show bland reactive histiocytic/myofibroblastic tissue with rare granulomas and benign gastrointestinal elements   No evidence of malignancy    4/29/24:        BONE MARROW, LEFT ILIAC CREST (ASPIRATE SMEAR, TOUCH IMPRINT, CLOT SECTION, AND CORE BIOPSY):  -- CELLULAR MARROW WITH TRILINEAGE HEMATOPOIESIS AND MEGAKARYOCYTIC HYPERPLASIA.  -- KAPPA RESTRICTED MONOTYPIC B LYMPHOCYTES (1.2%) AND ATYPICAL PLASMA CELLS (<0.1%) DETECTED BY FLOW CYTOMETRIC ANALYSIS.  -- NO MORPHOLOGIC EVIDENCE OF AMYLOID  DEPOSITION.  -- NO GRANULOMA.         Outside facility:  CT Guided Lung Bx  Date: 3/1/24  MEDIASTINAL BIOPSY:   - FOCAL NONCASEATING GRANULOMATOUS INFLAMMATION.       Bronchoscopy  Date: 1/31/24  1. LUNG, RIGHT LOWER LOBE, BRONCHIAL BIOPSY:   - BENIGN LOWER RESPIRATORY TRACT COLUMNAR MUCOSA WITH MINIMAL FOCI OF   SQUAMOUS METAPLASTIC ELEMENTS.   - NEGATIVE FOR CARCINOMA AND GRANULOMATOUS DISEASE.     2. LUNG, LEFT LOWER LOBE, BIOPSY:   - BENIGN BRONCHIAL WALL AND PULMONARY ALVEOLAR PARENCHYMAL TISSUES.   - NO EVIDENCE OF CARCINOMA OR GRANULOMATOUS DISEASE.   - NEGATIVE FOR ACTIVE PNEUMONITIS.     3. LUNG, RIGHT MAINSTEM BRONCHUS, BIOPSY:   - MINIMAL SMALL FRAGMENTS OF BRONCHIAL MUCOSA WITH SQUAMOUS METAPLASIA.   - NO EVIDENCE OF CARCINOMA OR GRANULOMATOUS DISEASE.     1. BRONCHIAL WASHING AND BAL:   - NEGATIVE FOR MALIGNANCY.     2. BRONCHIAL BRUSHINGS AND BAL:   - NEGATIVE FOR MALIGNANCY.     3. BRONCHIAL BRUSHINGS:   - NEGATIVE FOR MALIGNANCY; SQUAMOUS METAPLASIA AND REACTIVE ATYPIA ARE   PRESENT.       Culture 7/23/24:     Abnormal   MYCOBACTERIUM AVIUM COMPLEX  Further identified by [facility]  Organism     MYCOBACTERIUM AVIUM COMPLEX   Antibiotic                   KATHLEEN (mcg/mL)  Interpretation   ----------------------------------------------------------   Clofazimine                         0.12   Moxifloxacin                           >4       R   Clarithromycin                          4       S   Amikacin (IV)                          32       I   Amikacin (liposomal, inhaled)          32       S   Linezolid                              32       R    ACINETOBACTER BAUMANNII/HAEMOLYTICUS     Culture 8/21/24:  MYCOBACTERIUM SPECIES   Identification and susceptibility pending     Culture 9/13/24:  Culture positive, identification pending   Objective:   Vitals reviewed   Physical Exam  Vitals reviewed.   Constitutional:       Appearance: Normal appearance.   HENT:      Head: Normocephalic.    Cardiovascular:      Rate and Rhythm: Normal rate and regular rhythm.   Pulmonary:      Effort: Pulmonary effort is normal.   Musculoskeletal:         General: No swelling.   Neurological:      General: No focal deficit present.      Mental Status: She is alert and oriented to person, place, and time.   Psychiatric:         Mood and Affect: Mood normal.         Behavior: Behavior normal.         Assessment:     Problem List Items Addressed This Visit    None            Plan:       1. Sarcoidosis of lung  Non-caseating granulomas on biopsies lymph node biopsies (IR March 2024; EUS April 2024). No pathology currently evident for malignancy. She is following with oncology for possible LPL. Extensive workup has favored towards granulomatous disease - Sarcoidosis.  Radiologically, stage III  - symptoms (particularly fatigue) without steroids  - she is on MTX 15 mg weekly with folic acid  - steroids with dramatic improvement in symptoms  - currently on prednisone short course prescribed after a phone encounter in November  - no desaturation on walk test  Plan:  - currently at MTX 15 mg weekly - on Monday's  - symptoms recur when order off steroids  - okay to restart steroids - continue 10 mg then drop to 7.5 mg dialy after 1-2 weeks if if symptoms stable,  then decrease to 5 mg. If recur, can go back to 10 mg  - discussed benefit of steroid, but potential side effects including hyperglycemia, confusion and worsening ofactive MAC infection discussed  - continue folic acid daily  - discussed precautions for infection    - Quantiferon - negative (6/13/24)  - eye exam - patient will see ophthalmologist later in 2024  - EKG - patient follows with cardiology. Hx of CAD post CABG 2019.   - PFT - June 2024 - normal hetal, mild restriction, dlco moderately reduced  - Immunization: PPSV 23 2021; t-dap 2020      2. Mycobacterium, atypical  3/3 AFB positive  - MAC on first AFB  - started on antibiotic therapy with ID        Follow  up 3 months with PFT prior     50  minutes of total time spent on the encounter, which includes face to face time and non-face to face time preparing to see the patient (eg, review of tests), Obtaining and/or reviewing separately obtained history, Documenting clinical information in the electronic or other health record, Independently interpreting results (not separately reported) and communicating results to the patient/family/caregiver, or Care coordination (not separately reported).

## 2024-12-05 ENCOUNTER — TELEPHONE (OUTPATIENT)
Dept: FAMILY MEDICINE | Facility: CLINIC | Age: 74
End: 2024-12-05
Payer: MEDICARE

## 2024-12-05 NOTE — TELEPHONE ENCOUNTER
Call from pharmacy to inform that they were mailing the pt Prolia Injection, and to insure that the pt wont be charge the the medication

## 2024-12-11 ENCOUNTER — TELEPHONE (OUTPATIENT)
Dept: FAMILY MEDICINE | Facility: CLINIC | Age: 74
End: 2024-12-11
Payer: MEDICARE

## 2024-12-11 NOTE — TELEPHONE ENCOUNTER
I have placed a call to the pt and informed her that her \Prolia medication is here to be picked up VU

## 2024-12-12 ENCOUNTER — LAB VISIT (OUTPATIENT)
Dept: LAB | Facility: HOSPITAL | Age: 74
End: 2024-12-12
Attending: INTERNAL MEDICINE
Payer: MEDICARE

## 2024-12-12 DIAGNOSIS — D86.0 SARCOIDOSIS OF LUNG: ICD-10-CM

## 2024-12-12 LAB
ALBUMIN SERPL BCP-MCNC: 2.9 G/DL (ref 3.5–5.2)
ALP SERPL-CCNC: 70 U/L (ref 40–150)
ALT SERPL W/O P-5'-P-CCNC: 22 U/L (ref 10–44)
ANION GAP SERPL CALC-SCNC: 8 MMOL/L (ref 8–16)
AST SERPL-CCNC: 14 U/L (ref 10–40)
BASOPHILS # BLD AUTO: 0.02 K/UL (ref 0–0.2)
BASOPHILS NFR BLD: 0.3 % (ref 0–1.9)
BILIRUB SERPL-MCNC: 0.3 MG/DL (ref 0.1–1)
BUN SERPL-MCNC: 24 MG/DL (ref 8–23)
CALCIUM SERPL-MCNC: 9.5 MG/DL (ref 8.7–10.5)
CHLORIDE SERPL-SCNC: 106 MMOL/L (ref 95–110)
CO2 SERPL-SCNC: 27 MMOL/L (ref 23–29)
CREAT SERPL-MCNC: 1.2 MG/DL (ref 0.5–1.4)
DIFFERENTIAL METHOD BLD: ABNORMAL
EOSINOPHIL # BLD AUTO: 0.2 K/UL (ref 0–0.5)
EOSINOPHIL NFR BLD: 3.3 % (ref 0–8)
ERYTHROCYTE [DISTWIDTH] IN BLOOD BY AUTOMATED COUNT: 14.9 % (ref 11.5–14.5)
EST. GFR  (NO RACE VARIABLE): 47 ML/MIN/1.73 M^2
GLUCOSE SERPL-MCNC: 143 MG/DL (ref 70–110)
HCT VFR BLD AUTO: 32.2 % (ref 37–48.5)
HGB BLD-MCNC: 9.6 G/DL (ref 12–16)
IMM GRANULOCYTES # BLD AUTO: 0.05 K/UL (ref 0–0.04)
IMM GRANULOCYTES NFR BLD AUTO: 0.8 % (ref 0–0.5)
LYMPHOCYTES # BLD AUTO: 0.9 K/UL (ref 1–4.8)
LYMPHOCYTES NFR BLD: 14.1 % (ref 18–48)
MCH RBC QN AUTO: 27.7 PG (ref 27–31)
MCHC RBC AUTO-ENTMCNC: 29.8 G/DL (ref 32–36)
MCV RBC AUTO: 93 FL (ref 82–98)
MONOCYTES # BLD AUTO: 0.4 K/UL (ref 0.3–1)
MONOCYTES NFR BLD: 7.1 % (ref 4–15)
NEUTROPHILS # BLD AUTO: 4.5 K/UL (ref 1.8–7.7)
NEUTROPHILS NFR BLD: 74.4 % (ref 38–73)
NRBC BLD-RTO: 0 /100 WBC
PLATELET # BLD AUTO: 319 K/UL (ref 150–450)
PMV BLD AUTO: 10.7 FL (ref 9.2–12.9)
POTASSIUM SERPL-SCNC: 5.1 MMOL/L (ref 3.5–5.1)
PROT SERPL-MCNC: 6.5 G/DL (ref 6–8.4)
RBC # BLD AUTO: 3.46 M/UL (ref 4–5.4)
SODIUM SERPL-SCNC: 141 MMOL/L (ref 136–145)
WBC # BLD AUTO: 6.03 K/UL (ref 3.9–12.7)

## 2024-12-12 PROCEDURE — 87116 MYCOBACTERIA CULTURE: CPT | Mod: HCNC | Performed by: INTERNAL MEDICINE

## 2024-12-12 PROCEDURE — 80053 COMPREHEN METABOLIC PANEL: CPT | Mod: HCNC | Performed by: INTERNAL MEDICINE

## 2024-12-12 PROCEDURE — 87206 SMEAR FLUORESCENT/ACID STAI: CPT | Mod: HCNC | Performed by: INTERNAL MEDICINE

## 2024-12-12 PROCEDURE — 36415 COLL VENOUS BLD VENIPUNCTURE: CPT | Mod: HCNC | Performed by: INTERNAL MEDICINE

## 2024-12-12 PROCEDURE — 85025 COMPLETE CBC W/AUTO DIFF WBC: CPT | Mod: HCNC | Performed by: INTERNAL MEDICINE

## 2024-12-12 PROCEDURE — 87015 SPECIMEN INFECT AGNT CONCNTJ: CPT | Mod: HCNC | Performed by: INTERNAL MEDICINE

## 2024-12-13 LAB
ACID FAST MOD KINY STN SPEC: NORMAL
MYCOBACTERIUM SPEC QL CULT: NORMAL

## 2024-12-23 ENCOUNTER — OFFICE VISIT (OUTPATIENT)
Dept: OTOLARYNGOLOGY | Facility: CLINIC | Age: 74
End: 2024-12-23
Payer: MEDICARE

## 2024-12-23 VITALS
BODY MASS INDEX: 19.68 KG/M2 | SYSTOLIC BLOOD PRESSURE: 164 MMHG | WEIGHT: 106.94 LBS | DIASTOLIC BLOOD PRESSURE: 78 MMHG | HEIGHT: 62 IN

## 2024-12-23 DIAGNOSIS — J34.3 NASAL TURBINATE HYPERTROPHY: ICD-10-CM

## 2024-12-23 DIAGNOSIS — D86.9 SARCOID: ICD-10-CM

## 2024-12-23 DIAGNOSIS — R09.81 NASAL CONGESTION: Primary | ICD-10-CM

## 2024-12-23 DIAGNOSIS — J30.2 SEASONAL ALLERGIC RHINITIS, UNSPECIFIED TRIGGER: ICD-10-CM

## 2024-12-23 DIAGNOSIS — J34.89 NASAL CRUSTING: ICD-10-CM

## 2024-12-23 DIAGNOSIS — R04.0 EPISTAXIS: ICD-10-CM

## 2024-12-23 PROCEDURE — 3077F SYST BP >= 140 MM HG: CPT | Mod: CPTII,S$GLB,, | Performed by: OTOLARYNGOLOGY

## 2024-12-23 PROCEDURE — 1126F AMNT PAIN NOTED NONE PRSNT: CPT | Mod: CPTII,S$GLB,, | Performed by: OTOLARYNGOLOGY

## 2024-12-23 PROCEDURE — 99204 OFFICE O/P NEW MOD 45 MIN: CPT | Mod: 25,S$GLB,, | Performed by: OTOLARYNGOLOGY

## 2024-12-23 PROCEDURE — 3288F FALL RISK ASSESSMENT DOCD: CPT | Mod: CPTII,S$GLB,, | Performed by: OTOLARYNGOLOGY

## 2024-12-23 PROCEDURE — 3078F DIAST BP <80 MM HG: CPT | Mod: CPTII,S$GLB,, | Performed by: OTOLARYNGOLOGY

## 2024-12-23 PROCEDURE — 31231 NASAL ENDOSCOPY DX: CPT | Mod: S$GLB,,, | Performed by: OTOLARYNGOLOGY

## 2024-12-23 PROCEDURE — 1101F PT FALLS ASSESS-DOCD LE1/YR: CPT | Mod: CPTII,S$GLB,, | Performed by: OTOLARYNGOLOGY

## 2024-12-23 RX ORDER — CYCLOSPORINE 0.5 MG/ML
1 EMULSION OPHTHALMIC 2 TIMES DAILY
COMMUNITY
Start: 2024-10-01

## 2024-12-23 RX ORDER — INSULIN DEGLUDEC 100 U/ML
INJECTION, SOLUTION SUBCUTANEOUS
COMMUNITY
Start: 2024-12-19

## 2024-12-23 RX ORDER — MUPIROCIN 20 MG/G
OINTMENT TOPICAL 2 TIMES DAILY
Qty: 1 EACH | Refills: 0 | Status: SHIPPED | OUTPATIENT
Start: 2024-12-23 | End: 2025-01-06

## 2024-12-23 RX ORDER — FLUOROMETHOLONE 1 MG/ML
1 SUSPENSION/ DROPS OPHTHALMIC 2 TIMES DAILY
COMMUNITY
Start: 2024-09-26

## 2024-12-23 RX ORDER — PEN NEEDLE, DIABETIC 32GX 5/32"
NEEDLE, DISPOSABLE MISCELLANEOUS
COMMUNITY

## 2024-12-23 RX ORDER — CETIRIZINE HYDROCHLORIDE 10 MG/1
10 TABLET ORAL DAILY
Qty: 30 TABLET | Refills: 11 | Status: SHIPPED | OUTPATIENT
Start: 2024-12-23 | End: 2025-12-23

## 2024-12-23 NOTE — PROGRESS NOTES
OTOLARYNGOLOGY CLINIC NOTE  Date:  12/23/2024     Chief complaint:  Chief Complaint   Patient presents with    post nasal drip    Epistaxis       History of Present Illness  Kylah Deal is a 74 y.o. female  presenting today for a new evaluation and treatment of epistaxis     Has not had any large bleeds ; on occasion feels nose running and blots nose and blood in the snot.  Has been going on from mid to late September and also had drip; shortly after started on astelin started having nosebleeds. Sometimes goes a few days without bleeding. Sometimes when blows nose in bright red blood. Sometimes gets kleenex and dabs in nose; she can feel the scab in   Sometimes gets clots that come out - has flores a week to 10 days  Does not really dripout the front of  ; nose is wet     Had been on flonase at time used astelin but not recently  Has been on singulair but still gets congestion and sinus infections- usually has problem in the winter    Has cough issues - has not done her nebulizer this am     Had done zyrtec and flonase in the past and that did help    It is typically the right side ; on occasion gets a tinge from the left but clots and scabbing on the right   Had issues with gerd and had been on pantoprazole and past year had to change insurance for pulm issues just ran out of pantoprazole a couple of days ago usually when would go off of it would have take mylanta at night     Has a history of sarcoid and MAC- was diagnosed in January     Past Medical History  Past Medical History:   Diagnosis Date    CAD (coronary artery disease)     Diabetes mellitus     Hypertension     Migraine     Seizures         Past Surgical History  Past Surgical History:   Procedure Laterality Date    APPENDECTOMY      CHOLECYSTECTOMY      CORONARY ANGIOPLASTY WITH STENT PLACEMENT  2017    CORONARY ARTERY BYPASS GRAFT      ENDOSCOPIC ULTRASOUND OF UPPER GASTROINTESTINAL TRACT N/A 04/03/2024    Procedure: ULTRASOUND, UPPER GI TRACT,  ENDOSCOPIC;  Surgeon: Lavell Lomax MD;  Location: [facility] ENDO;  Service: Endoscopy;  Laterality: N/A;  4/1 portal- EUS-guided biopsy of anastacia hepatis mass.    HEEL SPUR EXCISION  1997    HIP FRACTURE SURGERY Left 2018    HYSTERECTOMY      TONSILLECTOMY      URETEROSCOPIC REMOVAL OF URETERIC CALCULUS Right 10/3/2024    Procedure: REMOVAL, CALCULUS, URETER, URETEROSCOPIC Laser lithotripsy Retrograde pyelogram Ureteral stent placement;  Surgeon: Annamarie Lake MD;  Location: [facility] OR;  Service: Urology;  Laterality: Right;  holmium laser 365 madrigal, time 2:13, energy 0.76kj    URETHRAL SLING  2022    WRIST SURGERY Right 2005        Medications  Current Outpatient Medications on File Prior to Visit   Medication Sig Dispense Refill    albuterol (PROVENTIL) 2.5 mg /3 mL (0.083 %) nebulizer solution Take 3 mLs (2.5 mg total) by nebulization every 6 (six) hours as needed for Wheezing. Rescue 120 mL 5    albuterol (PROVENTIL/VENTOLIN HFA) 90 mcg/actuation inhaler Inhale 2 puffs into the lungs every 4 (four) hours as needed.      albuterol-ipratropium (DUO-NEB) 2.5 mg-0.5 mg/3 mL nebulizer solution Take 3 mLs by nebulization every 6 (six) hours as needed for Wheezing or Shortness of Breath. Rescue 75 mL 0    aspirin (ECOTRIN) 81 MG EC tablet Take 81 mg by mouth once daily.      azithromycin (ZITHROMAX) 500 MG tablet Take 1 tablet (500 mg total) by mouth every Mon, Wed, Fri. 12 tablet 11    blood sugar diagnostic Strp Check blood glucose 4 times a day      carvediloL (COREG) 12.5 MG tablet Take 1 tablet by mouth 2 (two) times daily with meals.      clopidogreL (PLAVIX) 75 mg tablet Take 1 tablet by mouth once daily.      denosumab (PROLIA) 60 mg/mL Syrg Inject 60 mg into the skin every 6 (six) months.      ethambutoL (MYAMBUTOL) 400 MG Tab Take 3 tablets (1,200 mg total) by mouth every Mon, Wed, Fri. 36 tablet 11    evolocumab (REPATHA SURECLICK SUBQ) Inject into the skin.      ezetimibe (ZETIA) 10 mg tablet Take 10 mg by  "mouth once daily.      fluorometholone 0.1% (FML) 0.1 % DrpS Place 1 drop into both eyes 2 (two) times daily.      folic acid (FOLVITE) 1 MG tablet Take 1 tablet (1 mg total) by mouth once daily. 360 tablet 0    hydrALAZINE (APRESOLINE) 50 MG tablet Take 1 tablet by mouth 2 (two) times daily.      insulin aspart U-100 (NOVOLOG) 100 unit/mL (3 mL) InPn pen Inject 2 Units into the skin as needed.      levETIRAcetam (KEPPRA) 500 MG Tab Take 1 tablet (500 mg total) by mouth 2 (two) times daily. 180 tablet 3    linaGLIPtin (TRADJENTA) 5 mg Tab tablet Take 5 mg by mouth once daily.      lisinopriL (PRINIVIL,ZESTRIL) 40 MG tablet Take 1 tablet by mouth once daily.      methotrexate 2.5 MG Tab Take 6 tablets (15 mg total) by mouth every 7 days. 72 tablet 0    montelukast (SINGULAIR) 10 mg tablet Take 10 mg by mouth.      nitroGLYCERIN (NITROSTAT) 0.4 MG SL tablet Place 0.4 mg under the tongue.      pantoprazole (PROTONIX) 40 MG tablet Take 40 mg by mouth every morning.      predniSONE (DELTASONE) 10 MG tablet Take 0.5 tablets (5 mg total) by mouth once daily. 10 mg daily for 2 weeks; 7.5 mg daily for 1 - 2 weeks, then 5 mg daily after. 100 tablet 0    RESTASIS 0.05 % ophthalmic emulsion Place 1 drop into both eyes 2 (two) times daily.      rifabutin (MYCOBUTIN) 150 mg Cap Take 2 capsules (300 mg total) by mouth every Mon, Wed, Fri. 24 capsule 11    sodium chloride 3% 3 % nebulizer solution Take 4 mLs by nebulization 2 (two) times a day. 240 mL 3    solifenacin (VESICARE) 10 MG tablet Take 1 tablet by mouth once daily.      TRESIBA FLEXTOUCH U-100 100 unit/mL (3 mL) insulin pen SMARTSIG:3 Unit(s) SUB-Q Daily      DROPLET PEN NEEDLE 32 gauge x 5/32" Ndle USE 1 EACH BY MISC ROUTE FOUR TIMES DAILY      fluticasone (VERAMYST) 27.5 mcg/actuation nasal spray 2 sprays by Nasal route once daily. (Patient not taking: Reported on 12/23/2024)      furosemide (LASIX) 20 MG tablet Take 20 mg by mouth 2 (two) times daily.       No current " "facility-administered medications on file prior to visit.       Review of Systems  Review of Systems   Constitutional:  Positive for malaise/fatigue.   HENT:  Positive for hearing loss and nosebleeds.    Respiratory:  Positive for cough and shortness of breath.    Cardiovascular: Negative.    Gastrointestinal:  Positive for constipation.   Skin: Negative.    Neurological: Negative.    Endo/Heme/Allergies:  Bruises/bleeds easily.   Psychiatric/Behavioral: Negative.      Answers submitted by the patient for this visit:  Review of Symptoms Questionnaire  (Submitted on 12/18/2024)  postnasal drip: Yes  mouth sores: Yes  Acid Reflux?: Yes  Difficulty urinating?: Yes  Muscle aches / pain?: Yes  Seasonal Allergies?: Yes  Cold all of the time? : Yes    Social History   reports that she quit smoking about 16 years ago. Her smoking use included cigarettes. She has never used smokeless tobacco. She reports current alcohol use of about 1.0 standard drink of alcohol per week. She reports that she does not use drugs.     Family History  Family History   Problem Relation Name Age of Onset    Lung cancer Father      Breast cancer Sister      Cancer Maternal Uncle      Cancer Paternal Aunt      Cancer Maternal Grandmother          Physical Exam   Vitals:    12/23/24 1004   BP: (!) 164/78    Body mass index is 19.56 kg/m².  Weight: 48.5 kg (106 lb 14.8 oz)   Height: 5' 2" (157.5 cm)     GENERAL: no acute distress.  HEAD: normocephalic.   EYES: lids and lashes normal. No scleral icterus  EARS: external ear without lesion, normal pinna shape and position.  External auditory canal with normal cerumen, tympanic membrane fully visible, no perforation , no retraction. No middle ear effusion. Ossicles intact.   NOSE: external nose without significant bony abnormality anterior rhinoscopy : pinpoint blood nasal sils ; turbinate hypertrophy  ORAL CAVITY/OROPHARYNX: tongue  mobile.   NECK: trachea midline.   LYMPH NODES:No cervical " lymphadenopathy.  RESPIRATORY: no stridor, no stertor. Voice normal. Respirations nonlabored.  NEURO: alert, responds to questions appropriately.   PSYCH:mood appropriate    PROCEDURE NOTE  Procedure: diagnostic rigid nasal endoscopy  Indications for procedure: chronic nasal congestion, unilateral epistaixs, unable to view sinus area on anterior rhinoscopy       Consent: procedure was explained in detail and verbal consent was obtained.   Anesthesia:4% lidocaine with neosynephrine  Procedure in detail: With the patient in the seated position, the zero degree endoscope was inserted atraumatically into the bilateral nasal cavities and advance to the nasopharynx with the following areas examined with findings as described below.     Nasal cavity:no polyps or mass, no purulent drainage, no bleeding  Septum: no perforation spur on right; s shaped deviation; right caudal septum with enlarged blood vessel   Turbinates:  inferior turbinates hypertrophied, head of turbinate on left with blood streaks right turbinate with slight enlarged vessel on head of turbinate; middle turbinates not enlarged   Middle Meati: mild edema  No blood from spa area  Base of sphenoid without edema  Nasopharynx: no mass or lesion in the nasopharynx.   No submucosal nodules nor evidence of sarcoid changes in the nose    The scope was removed atraumatically without complication. The patient tolerated the procedure well. Photodocumentation obtained , all images and/or videos uploaded in media section of epic.                                                    Imaging:  The patient does not have any pertinent and/or recent imaging of the head and neck.     Labs:  CBC  Recent Labs   Lab 11/06/24 1926 11/25/24  1035 12/12/24  0736   WBC 6.96 8.19 6.03   Hemoglobin 9.7 L 9.5 L 9.6 L   Hematocrit 30.3 L 31.0 L 32.2 L   MCV 91 92 93   Platelets 399 328 319     BMP  Recent Labs   Lab 11/06/24 1926 11/07/24  0835 11/25/24  1035 12/12/24  0736   Glucose  109 277 H 171 H 143 H   Sodium 139 137 141 141   Potassium 4.2 5.0 4.5 5.1   Chloride 103  --  105 106   CO2 26  --  26 27   Carbon Dioxide  --  25  --   --    BUN 18  --  18 24 H   Blood Urea Nitrogen  --  26 H  --   --    Creatinine 1.1 1.28 H 1.2 1.2   Calcium 9.7 9.2 9.9 9.5   Magnesium 1.9  --   --   --      COAGS  Recent Labs   Lab 02/22/24  1223 03/27/24  1251 05/23/24  0922   INR 1.0 1.0 0.9       Assessment  1. Epistaxis  - Ambulatory referral/consult to ENT    2. Nasal congestion    3. Nasal turbinate hypertrophy    4. Sarcoid    5. Seasonal allergic rhinitis, unspecified trigger    6. Nasal crusting       Plan:  Discussed plan of care with patient in detail and all questions answered. Patient reported understanding of plan of care. I gave the patient the opportunity to ask questions and patient confirmed all questions answered to satisfaction.     Discussed prevention and tx for nosebleeds- do not rub nose or instrument with tissue. If feels need to blow nose, use saline  Bactroban for 2 weeks   May need to add astelin with saline and flonase -discussed about med sprays and importance of saline    May need to do a trial with ppi - has apptmt with them ; if postnasal drip not improving with saline and flonase after a few weeks add astelin. If still not improving after a few weeks notify me and can send in ppi   No sarcoid findings in nose however we discussed tx of sarcoid in the nose is with flonase    F/u 4-6 months , if not improving with above regimen notify for sooner apptmt         Please be aware that this note has been generated with the assistance of [employer] voice-to-text.  Please excuse any spelling or grammatical errors.

## 2024-12-23 NOTE — PATIENT INSTRUCTIONS
Information and instructions from your visit with me today:    Information and instructions from your visit with me today:    Get a humidifier at the bedside    Can use saline gel in the nose    MUPIROCIN: Use a cotton swab to apply gently inside the nostrils.  Do this 2 times a day for 2 weeks. After this is completed, use vaseline in the nose once daily.use saline spray each time before using the ointment      AFRIN (regular strength): Only use if you have significant bleeding. Use 3 large sprays on the side of bleeding then apply pressure against the front part of the nose on the side of bleeding by pinching the nose for 10-15 minutes without releasing pressure. If still bleeding repeat the afrin and hold pressure. Do not use this spray for more than 3 days in a row. This is very successful at resolving minor nose bleeds. The generic drug name for Afrin is oxymetazoline, and it is sold as a nasal decongestant.  NOTE:  You may not need to do this at all.   Hold nose on soft part and squish together     Lean head forward to prevent swallowing blood as that can make stomach upset    Do NOT do this ( do not put tissue in nose, do not press over bony part of nose)      Always use saline every time before a medication spray. You can also use saline on its own. If you are using saline and/or the medication sprays on an as needed basis and you have symptoms use the regimen daily for at least 2 weeks. You can use the flonase and astelin together, or if you prefer to start with just one medication spray, the flonase works better by itself compared to astelin by itself. You can try doing the saline and flonase and if still congested, add on the astelin again doing this regimen daily for up to two weeks when congestion. There may be times of the year that you only need saline and there may be times of the year that you need saline, flonase and astelin to control symptoms.     Start using the following medication nasal  "sprays:   Fluticasone spray:    This medication is a steroid spray. It stays within the nose and does not have absorption into the body that leads to side effects that one has with oral steroid medication. Fluticasone nasal spray is the same as the Flonase brand nasal spray. Discuss with your pharmacist if the price is lower over the counter or with a prescription ( this varies depending on insurance). The medication that is over the counter is the same as the prescription medication. Use this medication as instructed on the prescription, 1-2 sprays on each side of your nose twice daily.     Azelastine  spray:  This medication is an antihistamine used to treat nasal symptoms of allergy, which works specifically in the nose unlike antihistamine pills which have more of an effect on the whole body. Use this medication as instructed on the prescription, 1 spray on each side of your nose twice daily.     Additional instructions for medication sprays  Place the tip of the medication bottle in your nose and aim slightly up and out on each side to get medication high and deep into your nose and sinuses, and not have it all deposit in the very front of your nose. Aim the tip of the nozzle towards the outer corner of your eye . You can imagine aiming towards the back of your eyeball on each side for this, as opposed to straight back to the center of your nose and head.     You need to use this medication every day regardless of symptoms, as it takes time ( a few weeks) to work and get the benefits. It does not work on an "as needed" basis like taking a decongestant. If your symptoms only occur in a particular season, then the medication can be used seasonally instead of year long. For seasonal symptoms, you should start using the spray twice daily a month before when you normally have symptoms ( for example, if symptoms start in August, should start at the end of June).     Start nasal irrigations with saline solution- you " can either use a rinse or a mist spray:    NASAL SALINE SPRAY ( simply saline and arm and hammer are examples) There are several different brands found in the cold and flu aisle of the pharmacy. You can use any brand of saline spray - this will deliver the saline by a gentle mist ( if you have difficulty or discomfort with nasal rinse/ a lot of fluid in the nose, this will be more comfortable).       Always rinse your nose with saline prior to using medication sprays and wait a couple of hours before using again. You can use the saline throughout the day to help with stuffy nose or dry nose.    Do not use nasal decongestant sprays such as Afrin or similar products long term ( over 3 days) .  This can cause long term physical nasal addiction. Afrin should only be used if having nose bleeds, severe nasal congestion , or severe ear pain/fullness and should not be used for more than 2-3 days in a row . It is a not a medication that should be used for a long period of time.     It was nice meeting you today, and I look forward to helping you feel better soon. Please don't hesitate to call if you have any other questions or concerns, or if I can be of any assistance in the meantime.      Scarlett Edward MD    [facility]     Phone  [phone]    Fax      [phone]        Scarlett Edward MD  Otorhinolaryngology

## 2025-01-13 ENCOUNTER — LAB VISIT (OUTPATIENT)
Dept: LAB | Facility: HOSPITAL | Age: 75
End: 2025-01-13
Attending: INTERNAL MEDICINE
Payer: MEDICARE

## 2025-01-13 DIAGNOSIS — D86.0 SARCOIDOSIS OF LUNG: ICD-10-CM

## 2025-01-13 LAB
ALBUMIN SERPL BCP-MCNC: 2.9 G/DL (ref 3.5–5.2)
ALP SERPL-CCNC: 87 U/L (ref 40–150)
ALT SERPL W/O P-5'-P-CCNC: 21 U/L (ref 10–44)
ANION GAP SERPL CALC-SCNC: 9 MMOL/L (ref 8–16)
AST SERPL-CCNC: 15 U/L (ref 10–40)
BASOPHILS # BLD AUTO: 0.02 K/UL (ref 0–0.2)
BASOPHILS NFR BLD: 0.2 % (ref 0–1.9)
BILIRUB SERPL-MCNC: 0.5 MG/DL (ref 0.1–1)
BUN SERPL-MCNC: 18 MG/DL (ref 8–23)
CALCIUM SERPL-MCNC: 9.7 MG/DL (ref 8.7–10.5)
CHLORIDE SERPL-SCNC: 104 MMOL/L (ref 95–110)
CO2 SERPL-SCNC: 24 MMOL/L (ref 23–29)
CREAT SERPL-MCNC: 1.1 MG/DL (ref 0.5–1.4)
DIFFERENTIAL METHOD BLD: ABNORMAL
EOSINOPHIL # BLD AUTO: 0.1 K/UL (ref 0–0.5)
EOSINOPHIL NFR BLD: 1.1 % (ref 0–8)
ERYTHROCYTE [DISTWIDTH] IN BLOOD BY AUTOMATED COUNT: 16.1 % (ref 11.5–14.5)
EST. GFR  (NO RACE VARIABLE): 52 ML/MIN/1.73 M^2
GLUCOSE SERPL-MCNC: 187 MG/DL (ref 70–110)
HCT VFR BLD AUTO: 33.5 % (ref 37–48.5)
HGB BLD-MCNC: 10.7 G/DL (ref 12–16)
IMM GRANULOCYTES # BLD AUTO: 0.06 K/UL (ref 0–0.04)
IMM GRANULOCYTES NFR BLD AUTO: 0.6 % (ref 0–0.5)
LYMPHOCYTES # BLD AUTO: 1 K/UL (ref 1–4.8)
LYMPHOCYTES NFR BLD: 9.4 % (ref 18–48)
MCH RBC QN AUTO: 29.3 PG (ref 27–31)
MCHC RBC AUTO-ENTMCNC: 31.9 G/DL (ref 32–36)
MCV RBC AUTO: 92 FL (ref 82–98)
MONOCYTES # BLD AUTO: 1.4 K/UL (ref 0.3–1)
MONOCYTES NFR BLD: 13.1 % (ref 4–15)
NEUTROPHILS # BLD AUTO: 7.9 K/UL (ref 1.8–7.7)
NEUTROPHILS NFR BLD: 75.6 % (ref 38–73)
NRBC BLD-RTO: 0 /100 WBC
PLATELET # BLD AUTO: 295 K/UL (ref 150–450)
PMV BLD AUTO: 10.2 FL (ref 9.2–12.9)
POTASSIUM SERPL-SCNC: 4.7 MMOL/L (ref 3.5–5.1)
PROT SERPL-MCNC: 6.8 G/DL (ref 6–8.4)
RBC # BLD AUTO: 3.65 M/UL (ref 4–5.4)
SODIUM SERPL-SCNC: 137 MMOL/L (ref 136–145)
WBC # BLD AUTO: 10.44 K/UL (ref 3.9–12.7)

## 2025-01-13 PROCEDURE — 87206 SMEAR FLUORESCENT/ACID STAI: CPT | Mod: HCNC | Performed by: INTERNAL MEDICINE

## 2025-01-13 PROCEDURE — 87116 MYCOBACTERIA CULTURE: CPT | Mod: HCNC | Performed by: INTERNAL MEDICINE

## 2025-01-13 PROCEDURE — 80053 COMPREHEN METABOLIC PANEL: CPT | Mod: HCNC | Performed by: INTERNAL MEDICINE

## 2025-01-13 PROCEDURE — 85025 COMPLETE CBC W/AUTO DIFF WBC: CPT | Mod: HCNC | Performed by: INTERNAL MEDICINE

## 2025-01-13 PROCEDURE — 87015 SPECIMEN INFECT AGNT CONCNTJ: CPT | Mod: HCNC | Performed by: INTERNAL MEDICINE

## 2025-01-13 PROCEDURE — 36415 COLL VENOUS BLD VENIPUNCTURE: CPT | Mod: HCNC | Performed by: INTERNAL MEDICINE

## 2025-01-14 DIAGNOSIS — Z00.00 ENCOUNTER FOR MEDICARE ANNUAL WELLNESS EXAM: ICD-10-CM

## 2025-01-15 ENCOUNTER — OFFICE VISIT (OUTPATIENT)
Dept: INFECTIOUS DISEASES | Facility: CLINIC | Age: 75
End: 2025-01-15
Payer: MEDICARE

## 2025-01-15 VITALS
WEIGHT: 103.63 LBS | DIASTOLIC BLOOD PRESSURE: 71 MMHG | TEMPERATURE: 98 F | HEART RATE: 78 BPM | HEIGHT: 62 IN | BODY MASS INDEX: 19.07 KG/M2 | SYSTOLIC BLOOD PRESSURE: 128 MMHG

## 2025-01-15 DIAGNOSIS — A31.0 PULMONARY MYCOBACTERIUM AVIUM COMPLEX (MAC) INFECTION: Primary | ICD-10-CM

## 2025-01-15 PROCEDURE — 1101F PT FALLS ASSESS-DOCD LE1/YR: CPT | Mod: HCNC,CPTII,S$GLB, | Performed by: INTERNAL MEDICINE

## 2025-01-15 PROCEDURE — 3288F FALL RISK ASSESSMENT DOCD: CPT | Mod: HCNC,CPTII,S$GLB, | Performed by: INTERNAL MEDICINE

## 2025-01-15 PROCEDURE — 1126F AMNT PAIN NOTED NONE PRSNT: CPT | Mod: HCNC,CPTII,S$GLB, | Performed by: INTERNAL MEDICINE

## 2025-01-15 PROCEDURE — 3074F SYST BP LT 130 MM HG: CPT | Mod: HCNC,CPTII,S$GLB, | Performed by: INTERNAL MEDICINE

## 2025-01-15 PROCEDURE — 1159F MED LIST DOCD IN RCRD: CPT | Mod: HCNC,CPTII,S$GLB, | Performed by: INTERNAL MEDICINE

## 2025-01-15 PROCEDURE — 99999 PR PBB SHADOW E&M-EST. PATIENT-LVL IV: CPT | Mod: PBBFAC,HCNC,, | Performed by: INTERNAL MEDICINE

## 2025-01-15 PROCEDURE — 99214 OFFICE O/P EST MOD 30 MIN: CPT | Mod: HCNC,S$GLB,, | Performed by: INTERNAL MEDICINE

## 2025-01-15 PROCEDURE — 3078F DIAST BP <80 MM HG: CPT | Mod: HCNC,CPTII,S$GLB, | Performed by: INTERNAL MEDICINE

## 2025-01-15 NOTE — PROGRESS NOTES
"Infectious Disease Clinic Note  01/28/2025       Subjective:       Patient ID: Kylah Deal is a 74 y.o. female being seen for an new visit.    Chief Complaint: Follow-up    HPI  73y/o F pt with hx of T2DM, pulmonary sarcoid on MTX 15mg daily, CAD, HTN who was referred for pulm MAC.    Interval hx 1/15/25:  Has been feeling well. Notes cough has improved and is producing less sputum. Has been doing the nebulizer treatments. Is still on prednisone taper- currently on 5mg. No recent fevers, chills  or other signs of acute illness. Has been three times weekly azithromycin/ rifabutin/ ethambutol. Is tolerating well.        Initial visit:  Had started treatment for sarcoid, but was still having worsening cough and shortness of breath, so her pulmonologist decided to send afb cultures to [facility] for NTM.     No known hx of positive TB tests or known exposures.    Notes she was sick on 10/31 and was given 5 day course azithro.  Presented to ED on 11/6. CT concerning for progression of sarcoid. Denies signs or symptoms of systemic infection.     Reports she coughs every day throughout the day, it wakes her up from sleep.  Has been doing hypertonic saline nebs, which she reports does help her cough up phlegm. Has seen blood tinged sputum, but very rare.    AFB sputum 7/23/24, 8/21/24 and 9/13/24 grew MAC (S) Clarithro    CTA chest from 11/6/24 revealed "There is near complete right middle lobe atelectasis. There are extensive perilymphatic nodular and confluent opacities, mostly in the perihilar regions but present throughout the bilateral lungs. Findings have progressed in the left lower lobe and the anterior right upper lobe. "    QTc 422    Family History   Problem Relation Name Age of Onset    Lung cancer Father      Breast cancer Sister      Cancer Maternal Uncle      Cancer Paternal Aunt      Cancer Maternal Grandmother       Social History     Socioeconomic History    Marital status:    Tobacco Use    Smoking " status: Former     Current packs/day: 0.00     Types: Cigarettes     Quit date:      Years since quittin.0    Smokeless tobacco: Never   Substance and Sexual Activity    Alcohol use: Yes     Alcohol/week: 1.0 standard drink of alcohol     Types: 1 Glasses of wine per week    Drug use: Never     Social Drivers of Health     Financial Resource Strain: Low Risk  (2024)    Received from [facility]    Overall Financial Resource Strain (CARDIA)     Difficulty of Paying Living Expenses: Not hard at all   Food Insecurity: No Food Insecurity (2024)    Received from [facility]    Hunger Vital Sign     Worried About Running Out of Food in the Last Year: Never true     Ran Out of Food in the Last Year: Never true   Transportation Needs: No Transportation Needs (2024)    Received from [facility]    PRAPARE - Transportation     Lack of Transportation (Medical): No     Lack of Transportation (Non-Medical): No   Physical Activity: Inactive (2024)    Received from [facility]    Exercise Vital Sign     Days of Exercise per Week: 0 days     Minutes of Exercise per Session: 0 min   Stress: Stress Concern Present (2024)    Received from [facility]    [employer] [employer] - Occupational Stress Questionnaire     Feeling of Stress : To some extent   Housing Stability: Low Risk  (2024)    Housing Stability Vital Sign     Unable to Pay for Housing in the Last Year: No     Number of Places Lived in the Last Year: 1     Unstable Housing in the Last Year: No     Past Surgical History:   Procedure Laterality Date    APPENDECTOMY      CHOLECYSTECTOMY      CORONARY ANGIOPLASTY WITH STENT PLACEMENT  2017    CORONARY ARTERY BYPASS GRAFT      ENDOSCOPIC ULTRASOUND OF UPPER GASTROINTESTINAL TRACT N/A 2024    Procedure: ULTRASOUND, UPPER GI TRACT, ENDOSCOPIC;  Surgeon: Lavell Lomax MD;  Location: [facility];  Service: Endoscopy;  Laterality: N/A;   portal- EUS-guided biopsy of  "anastacia hepatis mass.    HEEL SPUR EXCISION  1997    HIP FRACTURE SURGERY Left 2018    HYSTERECTOMY      TONSILLECTOMY      URETEROSCOPIC REMOVAL OF URETERIC CALCULUS Right 10/3/2024    Procedure: REMOVAL, CALCULUS, URETER, URETEROSCOPIC Laser lithotripsy Retrograde pyelogram Ureteral stent placement;  Surgeon: Annamarie Lake MD;  Location: [facility];  Service: Urology;  Laterality: Right;  holmium laser 365 madrigal, time 2:13, energy 0.76kj    URETHRAL SLING  2022    WRIST SURGERY Right 2005       Patient's Medications   New Prescriptions    No medications on file   Previous Medications    ALBUTEROL (PROVENTIL) 2.5 MG /3 ML (0.083 %) NEBULIZER SOLUTION    Take 3 mLs (2.5 mg total) by nebulization every 6 (six) hours as needed for Wheezing. Rescue    ALBUTEROL (PROVENTIL/VENTOLIN HFA) 90 MCG/ACTUATION INHALER    Inhale 2 puffs into the lungs every 4 (four) hours as needed.    ALBUTEROL-IPRATROPIUM (DUO-NEB) 2.5 MG-0.5 MG/3 ML NEBULIZER SOLUTION    Take 3 mLs by nebulization every 6 (six) hours as needed for Wheezing or Shortness of Breath. Rescue    ASPIRIN (ECOTRIN) 81 MG EC TABLET    Take 81 mg by mouth once daily.    AZITHROMYCIN (ZITHROMAX) 500 MG TABLET    Take 1 tablet (500 mg total) by mouth every Mon, Wed, Fri.    BLOOD SUGAR DIAGNOSTIC STRP    Check blood glucose 4 times a day    CARVEDILOL (COREG) 12.5 MG TABLET    Take 1 tablet by mouth 2 (two) times daily with meals.    CETIRIZINE (ZYRTEC) 10 MG TABLET    Take 1 tablet (10 mg total) by mouth once daily.    CLOPIDOGREL (PLAVIX) 75 MG TABLET    Take 1 tablet by mouth once daily.    DENOSUMAB (PROLIA) 60 MG/ML SYRG    Inject 60 mg into the skin every 6 (six) months.    DROPLET PEN NEEDLE 32 GAUGE X 5/32" NDLE    USE 1 EACH BY MISC ROUTE FOUR TIMES DAILY    ETHAMBUTOL (MYAMBUTOL) 400 MG TAB    Take 3 tablets (1,200 mg total) by mouth every Mon, Wed, Fri.    EVOLOCUMAB (REPATHA SURECLICK SUBQ)    Inject into the skin.    EZETIMIBE (ZETIA) 10 MG TABLET    Take " 10 mg by mouth once daily.    FLUOROMETHOLONE 0.1% (FML) 0.1 % DRPS    Place 1 drop into both eyes 2 (two) times daily.    FLUTICASONE (VERAMYST) 27.5 MCG/ACTUATION NASAL SPRAY    2 sprays by Nasal route once daily.    FOLIC ACID (FOLVITE) 1 MG TABLET    Take 1 tablet (1 mg total) by mouth once daily.    FUROSEMIDE (LASIX) 20 MG TABLET    Take 20 mg by mouth 2 (two) times daily.    HYDRALAZINE (APRESOLINE) 50 MG TABLET    Take 1 tablet by mouth 2 (two) times daily.    INSULIN ASPART U-100 (NOVOLOG) 100 UNIT/ML (3 ML) INPN PEN    Inject 2 Units into the skin as needed.    LEVETIRACETAM (KEPPRA) 500 MG TAB    Take 1 tablet (500 mg total) by mouth 2 (two) times daily.    LINAGLIPTIN (TRADJENTA) 5 MG TAB TABLET    Take 5 mg by mouth once daily.    LISINOPRIL (PRINIVIL,ZESTRIL) 40 MG TABLET    Take 1 tablet by mouth once daily.    METHOTREXATE 2.5 MG TAB    Take 6 tablets (15 mg total) by mouth every 7 days.    MONTELUKAST (SINGULAIR) 10 MG TABLET    Take 10 mg by mouth.    NITROGLYCERIN (NITROSTAT) 0.4 MG SL TABLET    Place 0.4 mg under the tongue.    PANTOPRAZOLE (PROTONIX) 40 MG TABLET    Take 40 mg by mouth every morning.    PREDNISONE (DELTASONE) 10 MG TABLET    Take 0.5 tablets (5 mg total) by mouth once daily. 10 mg daily for 2 weeks; 7.5 mg daily for 1 - 2 weeks, then 5 mg daily after.    RESTASIS 0.05 % OPHTHALMIC EMULSION    Place 1 drop into both eyes 2 (two) times daily.    RIFABUTIN (MYCOBUTIN) 150 MG CAP    Take 2 capsules (300 mg total) by mouth every Mon, Wed, Fri.    SODIUM CHLORIDE 3% 3 % NEBULIZER SOLUTION    Take 4 mLs by nebulization 2 (two) times a day.    SOLIFENACIN (VESICARE) 10 MG TABLET    Take 1 tablet by mouth once daily.    TRESIBA FLEXTOUCH U-100 100 UNIT/ML (3 ML) INSULIN PEN    SMARTSIG:3 Unit(s) SUB-Q Daily   Modified Medications    No medications on file   Discontinued Medications    No medications on file       Patient Active Problem List    Diagnosis Date Noted    Immunocompromised  10/31/2024    Right kidney stone 10/03/2024    Sarcoidosis of lung 06/13/2024    Thrush, oral 06/13/2024    Coronary artery disease involving native coronary artery of native heart without angina pectoris 03/26/2024    Type 2 diabetes mellitus without complication, without long-term current use of insulin 03/26/2024    HTN (hypertension) 03/26/2024    Age-related osteoporosis without current pathological fracture 03/26/2024    History of tobacco abuse 03/26/2024    Hypertensive heart disease with heart failure 03/26/2024     Noted by [facility]  last documented on [id number]      Convulsions, unspecified convulsion type 02/29/2024    Aortic atherosclerosis 02/29/2024    NSTEMI (non-ST elevated myocardial infarction) 01/24/2024    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   HENT:  Negative for congestion and sore throat.    Eyes:  Negative for blurred vision and double vision.   Respiratory:  Positive for cough, sputum production and shortness of breath. Negative for hemoptysis.    Cardiovascular:  Negative for chest pain and palpitations.   Gastrointestinal:  Negative for diarrhea and vomiting.   Musculoskeletal:  Negative for myalgias and neck pain.   Skin:  Negative for itching and rash.   Neurological:  Negative for dizziness and headaches.   Psychiatric/Behavioral:  Negative for substance abuse. The patient is not nervous/anxious.    All other  "systems reviewed and are negative.          Objective:      /71 (BP Location: Left arm, Patient Position: Sitting)   Pulse 78   Temp 98 °F (36.7 °C) (Oral)   Ht 5' 2" (1.575 m)   Wt 47 kg (103 lb 9.9 oz)   LMP  (LMP Unknown)   BMI 18.95 kg/m²   Estimated body mass index is 18.95 kg/m² as calculated from the following:    Height as of this encounter: 5' 2" (1.575 m).    Weight as of this encounter: 47 kg (103 lb 9.9 oz).    Physical Exam  Constitutional:       General: She is not in acute distress.     Appearance: She is well-developed.   HENT:      Head: Normocephalic and atraumatic.   Eyes:      Conjunctiva/sclera: Conjunctivae normal.      Pupils: Pupils are equal, round, and reactive to light.   Cardiovascular:      Rate and Rhythm: Normal rate and regular rhythm.      Heart sounds: Normal heart sounds.   Pulmonary:      Effort: Pulmonary effort is normal. No respiratory distress.      Breath sounds: Normal breath sounds. No wheezing.   Abdominal:      General: Bowel sounds are normal. There is no distension.      Palpations: Abdomen is soft.   Musculoskeletal:         General: Normal range of motion.      Cervical back: Normal range of motion and neck supple.   Skin:     General: Skin is warm and dry.   Neurological:      General: No focal deficit present.      Mental Status: She is alert and oriented to person, place, and time.      Cranial Nerves: No cranial nerve deficit.   Psychiatric:         Mood and Affect: Mood normal.         Behavior: Behavior normal.         Assessment:         1. Pulmonary Mycobacterium avium complex (MAC) infection  AFB Culture & Smear              Plan:       Diagnoses and all orders for this visit:    Sarcoidosis of lung  on MTX and pred taper      Pulmonary MAC    Meets IDSA criteria for diagnosis. Pt with 3 positive afb sputum cultures, symptoms and radiological findings characteristic of MAC.  Mild to moderate noncavitary nodular disease; candidate for 3 times " weekly therapy    -continue 2 times weekly Azithromycin, Ethambutol, Rifabutin   -Will avoid Rifampin due to DDI with plavix and methotrexate  -Will monitor for drug toxicity with monthly cbc, cmp  -Duration of treatment is 12 mths from culture clearance. Pt to bring in sputum for monthly afb cx.  -counseled on the importance of airway clearance. Continue hypertonic saline neb bid and aerobika  -CT q 6 mths  - had recent eye exams; daily self-vision exams while on ethambutol  -had flu, rsv covid in December    RTC in 2-3 mths  Donna Jimenez MD  Infectious Disease     Total professional time spent for the encounter: 30 minutes  Time was spent preparing to see the patient, reviewing results of prior testing, obtaining and/or reviewing separately obtained history, performing a medically appropriate examination and interview, counseling and educating the patient/family, ordering medications/tests/procedures, referring and communicating with other health care professionals, documenting clinical information in the electronic health record, and independently interpreting results.

## 2025-02-12 ENCOUNTER — LAB VISIT (OUTPATIENT)
Dept: LAB | Facility: HOSPITAL | Age: 75
End: 2025-02-12
Attending: INTERNAL MEDICINE
Payer: MEDICARE

## 2025-02-12 ENCOUNTER — OFFICE VISIT (OUTPATIENT)
Dept: FAMILY MEDICINE | Facility: CLINIC | Age: 75
End: 2025-02-12
Payer: MEDICARE

## 2025-02-12 VITALS
SYSTOLIC BLOOD PRESSURE: 122 MMHG | HEIGHT: 62 IN | RESPIRATION RATE: 18 BRPM | WEIGHT: 111.75 LBS | TEMPERATURE: 98 F | HEART RATE: 76 BPM | BODY MASS INDEX: 20.56 KG/M2 | DIASTOLIC BLOOD PRESSURE: 60 MMHG | OXYGEN SATURATION: 97 %

## 2025-02-12 DIAGNOSIS — Z79.4 TYPE 2 DIABETES MELLITUS WITH STAGE 3A CHRONIC KIDNEY DISEASE, WITH LONG-TERM CURRENT USE OF INSULIN: Primary | ICD-10-CM

## 2025-02-12 DIAGNOSIS — G40.909 SEIZURE DISORDER: ICD-10-CM

## 2025-02-12 DIAGNOSIS — Z00.00 ENCOUNTER FOR PREVENTIVE HEALTH EXAMINATION: ICD-10-CM

## 2025-02-12 DIAGNOSIS — D86.0 SARCOIDOSIS OF LUNG: ICD-10-CM

## 2025-02-12 DIAGNOSIS — D71 HYPERCALCEMIA DUE TO GRANULOMATOUS DISEASE: ICD-10-CM

## 2025-02-12 DIAGNOSIS — N18.31 TYPE 2 DIABETES MELLITUS WITH STAGE 3A CHRONIC KIDNEY DISEASE, WITH LONG-TERM CURRENT USE OF INSULIN: Primary | ICD-10-CM

## 2025-02-12 DIAGNOSIS — J44.9 CHRONIC OBSTRUCTIVE PULMONARY DISEASE, UNSPECIFIED COPD TYPE: ICD-10-CM

## 2025-02-12 DIAGNOSIS — D84.9 IMMUNOCOMPROMISED: ICD-10-CM

## 2025-02-12 DIAGNOSIS — E11.22 TYPE 2 DIABETES MELLITUS WITH STAGE 3A CHRONIC KIDNEY DISEASE, WITH LONG-TERM CURRENT USE OF INSULIN: Primary | ICD-10-CM

## 2025-02-12 DIAGNOSIS — I11.0 HYPERTENSIVE HEART DISEASE WITH HEART FAILURE: ICD-10-CM

## 2025-02-12 DIAGNOSIS — Z00.00 ENCOUNTER FOR MEDICARE ANNUAL WELLNESS EXAM: ICD-10-CM

## 2025-02-12 DIAGNOSIS — E83.52 HYPERCALCEMIA DUE TO GRANULOMATOUS DISEASE: ICD-10-CM

## 2025-02-12 LAB
ALBUMIN SERPL BCP-MCNC: 3.1 G/DL (ref 3.5–5.2)
ALP SERPL-CCNC: 86 U/L (ref 40–150)
ALT SERPL W/O P-5'-P-CCNC: 25 U/L (ref 10–44)
ANION GAP SERPL CALC-SCNC: 10 MMOL/L (ref 8–16)
AST SERPL-CCNC: 21 U/L (ref 10–40)
BASOPHILS # BLD AUTO: 0.02 K/UL (ref 0–0.2)
BASOPHILS NFR BLD: 0.3 % (ref 0–1.9)
BILIRUB SERPL-MCNC: 0.3 MG/DL (ref 0.1–1)
BUN SERPL-MCNC: 24 MG/DL (ref 8–23)
CALCIUM SERPL-MCNC: 9.6 MG/DL (ref 8.7–10.5)
CHLORIDE SERPL-SCNC: 106 MMOL/L (ref 95–110)
CO2 SERPL-SCNC: 24 MMOL/L (ref 23–29)
CREAT SERPL-MCNC: 1.1 MG/DL (ref 0.5–1.4)
DIFFERENTIAL METHOD BLD: ABNORMAL
EOSINOPHIL # BLD AUTO: 0.2 K/UL (ref 0–0.5)
EOSINOPHIL NFR BLD: 3 % (ref 0–8)
ERYTHROCYTE [DISTWIDTH] IN BLOOD BY AUTOMATED COUNT: 16.6 % (ref 11.5–14.5)
EST. GFR  (NO RACE VARIABLE): 52 ML/MIN/1.73 M^2
GLUCOSE SERPL-MCNC: 168 MG/DL (ref 70–110)
HCT VFR BLD AUTO: 32.3 % (ref 37–48.5)
HGB BLD-MCNC: 10.1 G/DL (ref 12–16)
IMM GRANULOCYTES # BLD AUTO: 0.04 K/UL (ref 0–0.04)
IMM GRANULOCYTES NFR BLD AUTO: 0.6 % (ref 0–0.5)
LYMPHOCYTES # BLD AUTO: 1 K/UL (ref 1–4.8)
LYMPHOCYTES NFR BLD: 14.9 % (ref 18–48)
MCH RBC QN AUTO: 28.6 PG (ref 27–31)
MCHC RBC AUTO-ENTMCNC: 31.3 G/DL (ref 32–36)
MCV RBC AUTO: 92 FL (ref 82–98)
MONOCYTES # BLD AUTO: 0.6 K/UL (ref 0.3–1)
MONOCYTES NFR BLD: 9.4 % (ref 4–15)
NEUTROPHILS # BLD AUTO: 4.6 K/UL (ref 1.8–7.7)
NEUTROPHILS NFR BLD: 71.8 % (ref 38–73)
NRBC BLD-RTO: 0 /100 WBC
PLATELET # BLD AUTO: 349 K/UL (ref 150–450)
PMV BLD AUTO: 10.5 FL (ref 9.2–12.9)
POTASSIUM SERPL-SCNC: 4.8 MMOL/L (ref 3.5–5.1)
PROT SERPL-MCNC: 6.9 G/DL (ref 6–8.4)
RBC # BLD AUTO: 3.53 M/UL (ref 4–5.4)
SODIUM SERPL-SCNC: 140 MMOL/L (ref 136–145)
WBC # BLD AUTO: 6.37 K/UL (ref 3.9–12.7)

## 2025-02-12 PROCEDURE — 99999 PR PBB SHADOW E&M-EST. PATIENT-LVL V: CPT | Mod: PBBFAC,HCNC,, | Performed by: NURSE PRACTITIONER

## 2025-02-12 PROCEDURE — 87116 MYCOBACTERIA CULTURE: CPT | Mod: HCNC | Performed by: INTERNAL MEDICINE

## 2025-02-12 PROCEDURE — 36415 COLL VENOUS BLD VENIPUNCTURE: CPT | Mod: HCNC | Performed by: INTERNAL MEDICINE

## 2025-02-12 PROCEDURE — 87015 SPECIMEN INFECT AGNT CONCNTJ: CPT | Mod: HCNC | Performed by: INTERNAL MEDICINE

## 2025-02-12 PROCEDURE — 80053 COMPREHEN METABOLIC PANEL: CPT | Mod: HCNC | Performed by: INTERNAL MEDICINE

## 2025-02-12 PROCEDURE — 87206 SMEAR FLUORESCENT/ACID STAI: CPT | Mod: HCNC | Performed by: INTERNAL MEDICINE

## 2025-02-12 PROCEDURE — 85025 COMPLETE CBC W/AUTO DIFF WBC: CPT | Mod: HCNC | Performed by: INTERNAL MEDICINE

## 2025-02-12 NOTE — PATIENT INSTRUCTIONS
Counseling and Referral of Other Preventative  (Italic type indicates deductible and co-insurance are waived)    Patient Name: Kylah Deal  Today's Date: 2/12/2025    Health Maintenance       Date Due Completion Date    COVID-19 Vaccine (9 - 2024-25 season) 01/31/2025 12/6/2024    Diabetes Urine Screening 05/24/2025 5/24/2024    Hemoglobin A1c 07/27/2025 1/27/2025    Diabetic Eye Exam 10/10/2025 10/10/2024    DEXA Scan 12/15/2025 12/15/2022    Lipid Panel 01/27/2026 1/27/2025    Aspirin/Antiplatelet Therapy 02/12/2026 2/12/2025    TETANUS VACCINE 09/28/2030 9/28/2020        No orders of the defined types were placed in this encounter.    The following information is provided to all patients.  This information is to help you find resources for any of the problems found today that may be affecting your health:                  Living healthy guide: www.[facility].[place].[facility]      Understanding Diabetes: www.diabetes.org      Eating healthy: www.cdc.gov/healthyweight      [facility] home safety checklist: www.cdc.gov/steadi/patient.html      Agency on Aging: www.goea.[place].gov      Alcoholics anonymous (AA): www.aa.org      Physical Activity: www.[place].nih.gov/bt3cuyl      Tobacco use: www.quitwithusla.org

## 2025-02-12 NOTE — PROGRESS NOTES
"  Kylah Deal presented for a  Medicare AWV and comprehensive Health Risk Assessment today. The following components were reviewed and updated:    Medical history  Family History  Social history  Allergies and Current Medications  Health Risk Assessment  Health Maintenance  Care Team         ** See Completed Assessments for Annual Wellness Visit within the encounter summary.**         The following assessments were completed:  Living Situation  CAGE  Depression Screening  Timed Get Up and Go  Whisper Test  Cognitive Function Screening  Nutrition Screening  ADL Screening  PAQ Screening      Opioid documentation:      Patient does not have a current opioid prescription.        Vitals:    02/12/25 1452   BP: 122/60   BP Location: Left arm   Patient Position: Sitting   Pulse: 76   Resp: 18   Temp: 97.8 °F (36.6 °C)   TempSrc: Oral   SpO2: 97%   Weight: 50.7 kg (111 lb 12.4 oz)   Height: 5' 2" (1.575 m)     Body mass index is 20.44 kg/m².  Physical Exam  Vitals reviewed.   Constitutional:       General: She is not in acute distress.     Appearance: Normal appearance. She is not ill-appearing, toxic-appearing or diaphoretic.   HENT:      Head: Normocephalic and atraumatic.   Pulmonary:      Effort: Pulmonary effort is normal. No respiratory distress.   Skin:     General: Skin is warm and dry.   Neurological:      Mental Status: She is alert and oriented to person, place, and time.   Psychiatric:         Mood and Affect: Mood normal.         Behavior: Behavior normal.               Diagnoses and health risks identified today and associated recommendations/orders:    1. Encounter for Medicare annual wellness exam  The patient was seen today for an annual Medicare wellness exam.  Health maintenance and screening topics were discussed.  Proper diet and exercise recommendations were reviewed.  - Ambulatory Referral/Consult to Enhanced Annual Wellness Visit (eAWV)    2. Type 2 diabetes mellitus with stage 3a chronic kidney " disease, with long-term current use of insulin  See fairly well-controlled on current medications.  Continue.  Followed by endocrinology.    3. Chronic obstructive pulmonary disease, unspecified COPD type  Stable without any significant dyspnea today.  No acute concerns.    4. Seizure disorder  Stable on Keppra.  Continue.    5. Hypertensive heart disease with heart failure  BP controlled.  Continue current medications.    6. 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    7. Immunocompromised  On methotrexate and prednisone for sarcoidosis.  Continue.    8. Sarcoidosis of lung  Continue current management per pulmonology.    9. Encounter for preventive health examination  The patient was seen today for an annual Medicare wellness exam.  Health maintenance and screening topics were discussed.  Proper diet and exercise recommendations were reviewed.    Provided Kylah with a 5-10 year written screening schedule and personal prevention plan. Recommendations were developed using the USPSTF age appropriate recommendations. Education, counseling, and referrals were provided as needed. After Visit Summary printed and given to patient which includes a list of additional screenings\tests needed.    Follow up in about 1 year (around 2/12/2026) for AWV.    Erlin Padilla, EMMA      I offered to discuss advanced care planning, including how to pick a person who would make decisions for you if you were unable to make them for yourself, called a health care power of , and what kind of decisions you might make such as use of life sustaining treatments such as ventilators and tube feeding when faced with a life limiting illness recorded on a living will that they will need to know. (How you want to be cared for as you near the end of your natural life)     X Patient is interested in learning more about how to make advanced directives.  I provided them paperwork and offered to  discuss this with them.

## 2025-02-13 LAB
ACID FAST MOD KINY STN SPEC: NORMAL
MYCOBACTERIUM SPEC QL CULT: NORMAL

## 2025-02-18 DIAGNOSIS — G40.909 SEIZURE DISORDER: ICD-10-CM

## 2025-02-18 RX ORDER — LEVETIRACETAM 500 MG/1
500 TABLET ORAL 2 TIMES DAILY
Qty: 180 TABLET | Refills: 3 | Status: SHIPPED | OUTPATIENT
Start: 2025-02-18 | End: 2026-02-13

## 2025-02-18 NOTE — TELEPHONE ENCOUNTER
Refill Routing Note   Medication(s) are not appropriate for processing by [facility] for the following reason(s):        Outside of protocol    [facility] action(s):  Route             Appointments  past 12m or future 3m with PCP    Date Provider   Last Visit   2/29/2024 Osmar Cruz MD   Next Visit   Visit date not found Osmar Cruz MD   ED visits in past 90 days: 0        Note composed:8:22 AM 02/18/2025

## 2025-02-28 ENCOUNTER — OFFICE VISIT (OUTPATIENT)
Dept: PULMONOLOGY | Facility: CLINIC | Age: 75
End: 2025-02-28
Payer: MEDICARE

## 2025-02-28 ENCOUNTER — HOSPITAL ENCOUNTER (OUTPATIENT)
Dept: PULMONOLOGY | Facility: CLINIC | Age: 75
Discharge: HOME | End: 2025-02-28
Payer: MEDICARE

## 2025-02-28 VITALS
SYSTOLIC BLOOD PRESSURE: 119 MMHG | DIASTOLIC BLOOD PRESSURE: 56 MMHG | HEIGHT: 62 IN | HEART RATE: 66 BPM | OXYGEN SATURATION: 99 % | WEIGHT: 110 LBS | BODY MASS INDEX: 20.24 KG/M2

## 2025-02-28 DIAGNOSIS — A31.9 MYCOBACTERIUM, ATYPICAL: ICD-10-CM

## 2025-02-28 DIAGNOSIS — J44.9 OBSTRUCTIVE LUNG DISEASE: Primary | ICD-10-CM

## 2025-02-28 DIAGNOSIS — D86.0 SARCOIDOSIS OF LUNG: ICD-10-CM

## 2025-02-28 DIAGNOSIS — A31.0 MYCOBACTERIUM AVIUM COMPLEX: ICD-10-CM

## 2025-02-28 LAB
DLCO ADJ PRE: 8.81 ML/(MIN*MMHG) (ref 13.43–24.9)
DLCO SINGLE BREATH LLN: 13.43
DLCO SINGLE BREATH PRE REF: 40.5 %
DLCO SINGLE BREATH REF: 19.16
DLCOC SBVA LLN: 2.63
DLCOC SBVA PRE REF: 80.1 %
DLCOC SBVA REF: 4.16
DLCOC SINGLE BREATH LLN: 13.43
DLCOC SINGLE BREATH PRE REF: 46 %
DLCOC SINGLE BREATH REF: 19.16
DLCOCSBVAULN: 5.7
DLCOCSINGLEBREATHULN: 24.9
DLCOCSINGLEBREATHZSCORE: -2.97
DLCOSINGLEBREATHULN: 24.9
DLCOSINGLEBREATHZSCORE: -3.27
DLCOVA LLN: 2.63
DLCOVA PRE REF: 70.6 %
DLCOVA PRE: 2.94 ML/(MIN*MMHG*L) (ref 2.63–5.7)
DLCOVA REF: 4.16
DLCOVAULN: 5.7
DLVAADJ PRE: 3.33 ML/(MIN*MMHG*L) (ref 2.63–5.7)
FEF 25 75 LLN: 0.91
FEF 25 75 PRE REF: 33 %
FEF 25 75 REF: 2.3
FET100 CHG: 14.7 %
FEV05 LLN: 0.67
FEV05 REF: 1.52
FEV1 CHG: -2.2 %
FEV1 FVC LLN: 63
FEV1 FVC PRE REF: 81.7 %
FEV1 FVC REF: 78
FEV1 LLN: 1.34
FEV1 PRE REF: 76.6 %
FEV1 REF: 1.89
FEV1 VOL CHG: -0.03
FEV1FVCZSCORE: -1.64
FEV1ZSCORE: -1.33
FVC CHG: -1.8 %
FVC LLN: 1.75
FVC PRE REF: 92.8 %
FVC REF: 2.46
FVC VOL CHG: -0.04
FVCZSCORE: -0.4
IVC PRE: 2.04 L (ref 1.75–3.21)
IVC SINGLE BREATH LLN: 1.75
IVC SINGLE BREATH PRE REF: 83 %
IVC SINGLE BREATH REF: 2.46
IVCSINGLEBREATHULN: 3.21
PEF LLN: 3.25
PEF PRE REF: 76.3 %
PEF REF: 4.86
PHYSICIAN COMMENT: ABNORMAL
POST FEF 25 75: 0.71 L/S (ref 0.91–3.7)
POST FET 100: 9.41 SEC
POST FEV1 FVC: 63.18 % (ref 63.42–89.94)
POST FEV1: 1.42 L (ref 1.34–2.42)
POST FEV5: 1.08 L (ref 0.67–2.38)
POST FVC: 2.24 L (ref 1.75–3.21)
POST PEF: 3.44 L/S (ref 3.25–6.46)
PRE DLCO: 7.76 ML/(MIN*MMHG) (ref 13.43–24.9)
PRE FEF 25 75: 0.76 L/S (ref 0.91–3.7)
PRE FET 100: 8.2 SEC
PRE FEV05 REF: 71.9 %
PRE FEV1 FVC: 63.44 % (ref 63.42–89.94)
PRE FEV1: 1.45 L (ref 1.34–2.42)
PRE FEV5: 1.09 L (ref 0.67–2.38)
PRE FVC: 2.28 L (ref 1.75–3.21)
PRE PEF: 3.71 L/S (ref 3.25–6.46)
VA PRE: 2.64 L (ref 4.45–4.45)
VA SINGLE BREATH LLN: 4.45
VA SINGLE BREATH PRE REF: 59.3 %
VA SINGLE BREATH REF: 4.45
VASINGLEBREATHULN: 4.45

## 2025-02-28 PROCEDURE — 94060 EVALUATION OF WHEEZING: CPT | Mod: HCNC,S$GLB,, | Performed by: INTERNAL MEDICINE

## 2025-02-28 PROCEDURE — 99999 PR PBB SHADOW E&M-EST. PATIENT-LVL IV: CPT | Mod: PBBFAC,HCNC,, | Performed by: INTERNAL MEDICINE

## 2025-02-28 PROCEDURE — 94729 DIFFUSING CAPACITY: CPT | Mod: HCNC,S$GLB,, | Performed by: INTERNAL MEDICINE

## 2025-02-28 RX ORDER — BUDESONIDE AND FORMOTEROL FUMARATE DIHYDRATE 160; 4.5 UG/1; UG/1
2 AEROSOL RESPIRATORY (INHALATION) EVERY 12 HOURS
Qty: 10.2 G | Refills: 3 | Status: SHIPPED | OUTPATIENT
Start: 2025-02-28 | End: 2026-02-28

## 2025-02-28 RX ORDER — METHOTREXATE 2.5 MG/1
15 TABLET ORAL
Qty: 72 TABLET | Refills: 0 | Status: SHIPPED | OUTPATIENT
Start: 2025-02-28 | End: 2025-05-29

## 2025-02-28 RX ORDER — PREDNISONE 5 MG/1
5 TABLET ORAL DAILY
Qty: 30 TABLET | Refills: 0 | Status: SHIPPED | OUTPATIENT
Start: 2025-02-28

## 2025-02-28 NOTE — PROGRESS NOTES
"  Subjective:   Patient ID: Kylah Deal is a 74 y.o. female    Chief Complaint:   Chief Complaint   Patient presents with    Follow-up     HPI  75 yo female with history of Sarcoidosis. She is established with pulmonary and previously followed with Dr. Briggs.   Other PMH includes former smoke (half pack per week quit age 58), CAD and DMII.   She last saw me in December 2024.    Her pulmonary issues:  - Sarcoidosis - stage III and extrapulmonary involvement.  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 There is improvement in cough - less productive than before.  She is using albuterol + hypertonic saline neb, but not using daily. She is able to get through both treatments without coughing. Does not use PEP daily because of decrease sputum production and chest congestion.  She does occasionally experience chest tightness and wheezing especially when ambulating outside in the cold.  She experiences intermittent burning sensation. She has GERD. She will be getting evaluated with GI soon. Previously took PPI.  She is staying active, often doing activities outside.   She is taking Mtx 15 mg weekly. She is taking prednisone 5 mg daily and is interested in coming off it.  She continues to take Azithromycin, Rifabutin, and Ethambutol 3 times a week.         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  She was started on a regimen of Azithromycin, Ethambutol, and Rifabutin by Infectious Diseases (ID) during her initial visit on 11/12/24.  She reports that her symptoms are variable depending on steroid use. She is currently on methotrexate (15 mg on Mondays) and takes folic acid as part of her treatment regimen. Previous attempts to wean off steroids have resulted in increased fatigue and shortness of breath. She described feeling "crummy" when off steroids. After a phone " consultation on 11/7/24, she was prescribed steroids, which she started at 20 mg daily for several days and has since tapered to 10 mg daily. She reports significant improvement while on steroids.  She has gained a few pounds since her last visit.      9/27/24:  Reports having had URI symptoms 3 weeks ago requiring evaluation at . Was told it was non-allergic rhinitis and given Mucinex. She is starting to feel better from that. She has chronic cough, productive of green phlegm but now clearing. Did experience night sweats twice in the interval since last visit. Dyspnea and fatigue are better. She went to [place] on a vacation and was able to do some hiking. She is taking Prednisone 10 mg daily and Methotrexate 10 mg weekly, which takes on a Monday. Her Ca level was 12.6 on 8/16/24, better since then most recently 10.1 on 9/25/24. She is avoiding Vitamin D and spinach. She is planned for ureteroscopy and stone removal on 10/3/24. Glucose control has been difficult . Typically high after steroids and has periods of hypoglycemia to as low as 40. She is working with endocrinologist to make adjustments accordingly.    8/16/24  Her diagnosis of Sarcoidosis started with abnormally high calcium level on blood work, leading to CT thorax with bilateral perihilar and peribronchial irregular interstitial opacities. She initially underwent bronchoscopy with tbbx at outside hospital in January 2024, but negative for granulomatous disease and malignancy. A (mediastinal) lymph node biopsy with performed with IR on March 1, 2024, resulted in non-caseating granuloma. She was seen by oncology at [facility] [place], and presented at Tumor Board, with recommendation for further sampling. She then underwent a EUS on 4/3/24 with finding of enlarged lymph nodes in middle paraesophageal mediastinum, biopsied with finding of rare granulomas.    Her symptoms included shortness of breath with exertion, cough and significant fatigue. She also experienced  night sweats. She was started on Prednisone initially at 30 mg at the end of May 2024, which made her feel great, but this led to uncontrolled glycemic index and difficulty with adjusting her novolog. She was seen in a clinic visit on 2024, at which time she was recommended to change to MTX as steroid sparring therapy - she started at MTX 2.5 mg weekly (first dose on ) and increased to 5 mg on . She was instructed to wean off steroids from 15 mg every 5 days and completed steroids on 2024. Her fatigue recurred during the wean and persisted off steroids and was is instructed to resume steroids on 2024 at 10 mg. Currently her Methotrexate is up to maintenance dose of 10 mg weekly since . She is taking folic acid daily.    Currently, on MTX 10 mg weekly and Prednisone 10 mg, she does feel better, but still experiencing fatigue which is the most cumbersome symptom. Her glucose control is better, but still experiencing fluctuation in glucose levels. She will be seeing endocrinologist next week. She believes a higher dose would make her feel more energy.    She denies any nausea, vomiting, gastrointestinal symptoms. No abdominal pain.     She experiences brain fogginess for an hour after steroids. Appetite is good. Does have some blurry vision. Has not been able to see ophthalmologist since diagnosis of Sarcoidosis.    Of note, her sputum culture from 24 is positive for Mycobacterium species - still in processing     She follows with oncologist, Dr. Sanchez. Myeloma labs were performed, and found to have monoclonal protein. A bone marrow biopsy was then performed and there is some concern for possible indolent LPL. She will be following with Dr. Sanchez later in the year.      History:  Former smoker. Stopped in .  1/2ppw age [age]   No known occupational exposures.  Father was a   Dad was a heavy smoker- 3ppd;  of lung cancer  Mother with  COPD      Imaging:    CT Nov 6, 2024: personally reviewed:  1. No pulmonary embolism to the segmental level.  2. Sequela of sarcoidosis including extensive soft tissue thickening in the mediastinum and the bilateral hilar regions and perilymphatic nodularity, with progression in the left lower lobe and the right upper lobe from prior.    CT May 10, 2024:  Retroperitoneum: Retrocrural and bilateral upper abdominal para-aortic adenopathy appears similar to the prior exam with adenopathy extending into the anastacia hepatis.  Vasculature: Severe atherosclerosis of the abdominal aorta and its branches.  Bladder: No evidence of wall thickening.  Reproductive organs: Hysterectomy suspected.  Bones: No suspicious lytic or blastic lesions.  Impression:  Stable appearance of the chest abdomen pelvis with mediastinal, retrocrural, and retroperitoneal confluent adenopathy and extensive Svetlana lymphatic nodular and confluent bilateral lung opacities.  Findings that can be seen in patient's provided diagnosis of sarcoidosis and correlation with tissue diagnosis, if not already performed is suggested.  Lymphoproliferative disorder could conceivably have a similar appearance but thought less likely     It is difficult to measure discrete lesions according to recist criteria.     Stable mild intrahepatic biliary dilatation     Severe atherosclerosis     Right nephrolithiasis, proximal right hydroureter and bilateral urothelial wall thickening suggesting chronic inflammation.     Lesion 1: organ (max 2 lesions per organ). current measurement - long axis for mass (minimum baseline 1 cm), short axis for node (minimum baseline 1.5) cm. Series  Image . Prior measurement  cm.     Lesion 2: organ (max 2 lesions per organ). current measurement - long axis for mass (minimum baseline 1 cm), short axis for node (minimum baseline 1.5) cm. Series  Image . Prior measurement  cm.     Lesion 3: organ (max 2 lesions per organ). current measurement - long  axis for mass (minimum baseline 1 cm), short axis for node (minimum baseline 1.5) cm. Series  Image . Prior measurement  cm.     Lesion 4: organ (max 2 lesions per organ). current measurement - long axis for mass (minimum baseline 1 cm), short axis for node (minimum baseline 1.5) cm. Series  Image . Prior measurement  cm.     Lesion 5: organ (max 2 lesions per organ). current measurement - long axis for mass (minimum baseline 1 cm), short axis for node (minimum baseline 1.5) cm. Series  Image . Prior measurement  cm.    Path:  EUS 4/3/24:  1 and 2.  Svetlana-gastric mass, endoscopic ultrasound-guided (EUS) biopsies with pathologist adequacy:   Parts 1 and 2 show bland reactive histiocytic/myofibroblastic tissue with rare granulomas and benign gastrointestinal elements   No evidence of malignancy    4/29/24:        BONE MARROW, LEFT ILIAC CREST (ASPIRATE SMEAR, TOUCH IMPRINT, CLOT SECTION, AND CORE BIOPSY):  -- CELLULAR MARROW WITH TRILINEAGE HEMATOPOIESIS AND MEGAKARYOCYTIC HYPERPLASIA.  -- KAPPA RESTRICTED MONOTYPIC B LYMPHOCYTES (1.2%) AND ATYPICAL PLASMA CELLS (<0.1%) DETECTED BY FLOW CYTOMETRIC ANALYSIS.  -- NO MORPHOLOGIC EVIDENCE OF AMYLOID DEPOSITION.  -- NO GRANULOMA.         Outside facility:  [facility]  Date: 3/1/24  MEDIASTINAL BIOPSY:   - FOCAL NONCASEATING GRANULOMATOUS INFLAMMATION.       Bronchoscopy  Date: 1/31/24  1. LUNG, RIGHT LOWER LOBE, BRONCHIAL BIOPSY:   - BENIGN LOWER RESPIRATORY TRACT COLUMNAR MUCOSA WITH MINIMAL FOCI OF   SQUAMOUS METAPLASTIC ELEMENTS.   - NEGATIVE FOR CARCINOMA AND GRANULOMATOUS DISEASE.     2. LUNG, LEFT LOWER LOBE, BIOPSY:   - BENIGN BRONCHIAL WALL AND PULMONARY ALVEOLAR PARENCHYMAL TISSUES.   - NO EVIDENCE OF CARCINOMA OR GRANULOMATOUS DISEASE.   - NEGATIVE FOR ACTIVE PNEUMONITIS.     3. LUNG, RIGHT MAINSTEM BRONCHUS, BIOPSY:   - MINIMAL SMALL FRAGMENTS OF BRONCHIAL MUCOSA WITH SQUAMOUS METAPLASIA.   - NO EVIDENCE OF CARCINOMA OR GRANULOMATOUS DISEASE.     1.  BRONCHIAL WASHING AND BAL:   - NEGATIVE FOR MALIGNANCY.     2. BRONCHIAL BRUSHINGS AND BAL:   - NEGATIVE FOR MALIGNANCY.     3. BRONCHIAL BRUSHINGS:   - NEGATIVE FOR MALIGNANCY; SQUAMOUS METAPLASIA AND REACTIVE ATYPIA ARE   PRESENT.       Culture 7/23/24:     Abnormal   MYCOBACTERIUM AVIUM COMPLEX  Further identified by [facility]  Organism     MYCOBACTERIUM AVIUM COMPLEX   Antibiotic                   KATHLEEN (mcg/mL)  Interpretation   ----------------------------------------------------------   Clofazimine                         0.12   Moxifloxacin                           >4       R   Clarithromycin                          4       S   Amikacin (IV)                          32       I   Amikacin (liposomal, inhaled)          32       S   Linezolid                              32       R    ACINETOBACTER BAUMANNII/HAEMOLYTICUS     Culture 8/21/24:     Abnormal   MYCOBACTERIUM AVIUM COMPLEX       Culture 9/13/24:  Mycobacterium avium complex     Culture 12/12/24:  Mycobacterium avium complex     Culture 1/13/25:    Culture positive, identification pending      Culture 2/12/25:  Culture positive, identification pending P        PFT:  FEV1/FVC: 63% from 70% June 2024  FEV1:  1.45 L, 76.6 from 1.57 L in June 2024  FVC:  2.28 L, 92.8% from 2.25 L in June 2024   DLCO:  40.5%      WALK TEST:    Kylah Deal is a 74 y.o.   female patient, who presents for a 6 minute walk test ordered by MD Nick.  The diagnosis is Qualify for Oxygen; Sarcoidosis.  The patient's BMI is 19.6 kg/m2.  Predicted distance (lower limit of normal) is 312.62 meters.       Test Results:     The test was completed without stopping.  The total time walked was 360 seconds.  During walking, the patient reported:  Dyspnea.  The patient used no assistive devices during testing.      12/03/2024---------Distance: 354.18 meters (1162 feet)       O2 Sat % Supplemental Oxygen Heart Rate Blood Pressure Diamond Scale   Pre-exercise  (Resting) 99 %  Room Air 86 bpm 153/72 mmHg 1   During Exercise 97 % Room Air 107 bpm 168/74 mmHg 4   Post-exercise  (Recovery) 98 % Room Air  99 bpm              Objective:   Vitals reviewed   Physical Exam  Vitals reviewed.   Constitutional:       Appearance: Normal appearance.   HENT:      Head: Normocephalic.   Cardiovascular:      Rate and Rhythm: Normal rate and regular rhythm.   Pulmonary:      Effort: Pulmonary effort is normal.   Musculoskeletal:         General: No swelling.   Neurological:      General: No focal deficit present.      Mental Status: She is alert and oriented to person, place, and time.   Psychiatric:         Mood and Affect: Mood normal.         Behavior: Behavior normal.         Assessment:     Problem List Items Addressed This Visit          Pulmonary    Sarcoidosis of lung    Relevant Medications    methotrexate 2.5 MG Tab     Other Visit Diagnoses         Obstructive lung disease    -  Primary      Mycobacterium, atypical                      Plan:       1. Sarcoidosis of lung  Non-caseating granulomas on biopsies lymph node biopsies (IR March 2024; EUS April 2024). No pathology currently evident for malignancy. She is following with oncology for possible LPL. Extensive workup has favored towards granulomatous disease - Sarcoidosis.  Radiologically, stage III  - symptoms (particularly fatigue) without steroids  - she is on MTX 15 mg weekly with folic acid  - no desaturation on walk test  - fvc stable  Plan:  - currently at MTX 15 mg weekly - on Monday's  - stable symptoms currently - plan to stop prednisone, which is currently at 5mg  - continue folic acid daily    - Quantiferon - negative (6/13/24)  - eye exam - patient follows with an ophthalmologist  - EKG - patient follows with cardiology. Hx of CAD post CABG 2019.   - Immunization: PPSV 23 2021; t-dap 2020; RSV 2024; influenza for the season    2. Obstructive Lung disease  - decreased FEV1 compared to June 2024  - start ICS-LABA with Symbicort.  Technique reviewed, instructed to rinse mouth after use    3. Mycobacterium, atypical  All cultures remain positive  - started on antibiotic therapy with ID  - neb with albuterol + HS + acapella encouraged        Follow up 6 months in pulmonary clinic    40  minutes of total time spent on the encounter, which includes face to face time and non-face to face time preparing to see the patient (eg, review of tests), Obtaining and/or reviewing separately obtained history, Documenting clinical information in the electronic or other health record, Independently interpreting results (not separately reported) and communicating results to the patient/family/caregiver, or Care coordination (not separately reported).

## 2025-03-10 ENCOUNTER — OFFICE VISIT (OUTPATIENT)
Dept: GASTROENTEROLOGY | Facility: CLINIC | Age: 75
End: 2025-03-10
Payer: MEDICARE

## 2025-03-10 VITALS
HEIGHT: 61 IN | BODY MASS INDEX: 21.39 KG/M2 | DIASTOLIC BLOOD PRESSURE: 74 MMHG | SYSTOLIC BLOOD PRESSURE: 163 MMHG | HEART RATE: 70 BPM | WEIGHT: 113.31 LBS

## 2025-03-10 DIAGNOSIS — K21.9 GASTROESOPHAGEAL REFLUX DISEASE, UNSPECIFIED WHETHER ESOPHAGITIS PRESENT: Primary | ICD-10-CM

## 2025-03-10 DIAGNOSIS — K59.04 CHRONIC IDIOPATHIC CONSTIPATION: ICD-10-CM

## 2025-03-10 PROCEDURE — 99999 PR PBB SHADOW E&M-EST. PATIENT-LVL V: CPT | Mod: PBBFAC,HCNC,,

## 2025-03-10 RX ORDER — PANTOPRAZOLE SODIUM 40 MG/1
40 TABLET, DELAYED RELEASE ORAL DAILY
Qty: 90 TABLET | Refills: 3 | Status: SHIPPED | OUTPATIENT
Start: 2025-03-10 | End: 2026-03-10

## 2025-03-10 NOTE — PROGRESS NOTES
"    [facility] Consultation Note    Reason for Consult:  The primary encounter diagnosis was Gastroesophageal reflux disease, unspecified whether esophagitis present. A diagnosis of Chronic idiopathic constipation was also pertinent to this visit.    PCP:   Osmar Cruz   No address on file    Referring MD:  Self, Aaareferral  No address on file    HPI:  This is a 74 y.o. female here to establish care, schedule colonoscopy, and for f/u of GERD.     Pt reports last colonoscopy was in 2019 with few small benign polyps and recommended repeat in 5 years. She reports maternal grandmother and paternal aunt with colon cancer. Previous scope records scanned into chart. She reports mild constipation for which she has been taking nightly stool softener with complete relief. Reports regular, formed stool every morning. She has occasional longer episode of constipation, but this resolves with OTC laxative. Denies diarrhea, bloody stools, rectal bleeding, melena, or unintentional weight loss.     Reports chronic hx of GERD. She was taking Pantoprazole 40mg once daily with complete relief, but ran out of her prescription and wasn't able to get it refilled. States she sleeps upright and avoids known triggers, but still has frequent breakthrough symptoms. Denies dysphagia, odynophagia, regurgitation, fullness, nausea, and vomiting. Last EGD in 2022 with an esophageal ring that was dilated. Reports previous GES was normal.     Objective Findings:    Vital Signs:  BP (!) 163/74   Pulse 70   Ht 5' 1" (1.549 m)   Wt 51.4 kg (113 lb 5.1 oz)   LMP  (LMP Unknown)   BMI 21.41 kg/m²   Body mass index is 21.41 kg/m².    Physical Exam:  General Appearance: Well appearing in no acute distress  Abdomen: Soft, non tender, non distended in all four quadrants. No hepatosplenomegaly, ascites, or mass.    Assessment:  1. Gastroesophageal reflux disease, unspecified whether esophagitis present    2. Chronic idiopathic " constipation      This is a 74 y.o F who presents for evaluation of GERD and to schedule colonoscopy. Last cscope in 2019 w rec repeat in 5 years. She ran out of her PPI and has been having frequent reflux. Requesting refill.     - Ordered colonoscopy  - Continue current bowel regimen.   - Maintain adequate hydration and high fiber diet  - Restart Protonix 40mg once daily  - Continue sleeping upright and avoid known triggers    RTC as needed.     Thank you so much for allowing me to participate in the care of Kylah Bene Sarah Abukhader, PA-C [facility]

## 2025-03-12 ENCOUNTER — LAB VISIT (OUTPATIENT)
Dept: LAB | Facility: HOSPITAL | Age: 75
End: 2025-03-12
Attending: INTERNAL MEDICINE
Payer: MEDICARE

## 2025-03-12 ENCOUNTER — PATIENT MESSAGE (OUTPATIENT)
Dept: ENDOSCOPY | Facility: HOSPITAL | Age: 75
End: 2025-03-12
Payer: MEDICARE

## 2025-03-12 ENCOUNTER — RESULTS FOLLOW-UP (OUTPATIENT)
Dept: INFECTIOUS DISEASES | Facility: CLINIC | Age: 75
End: 2025-03-12

## 2025-03-12 ENCOUNTER — TELEPHONE (OUTPATIENT)
Dept: ENDOSCOPY | Facility: HOSPITAL | Age: 75
End: 2025-03-12
Payer: MEDICARE

## 2025-03-12 DIAGNOSIS — Z13.9 SCREENING DUE: Primary | ICD-10-CM

## 2025-03-12 DIAGNOSIS — Z80.0 FAMILY HISTORY OF COLON CANCER: ICD-10-CM

## 2025-03-12 DIAGNOSIS — D86.0 SARCOIDOSIS OF LUNG: ICD-10-CM

## 2025-03-12 LAB
ALBUMIN SERPL BCP-MCNC: 3.2 G/DL (ref 3.5–5.2)
ALP SERPL-CCNC: 81 U/L (ref 40–150)
ALT SERPL W/O P-5'-P-CCNC: 30 U/L (ref 10–44)
ANION GAP SERPL CALC-SCNC: 9 MMOL/L (ref 8–16)
AST SERPL-CCNC: 24 U/L (ref 10–40)
BASOPHILS # BLD AUTO: 0.02 K/UL (ref 0–0.2)
BASOPHILS NFR BLD: 0.4 % (ref 0–1.9)
BILIRUB SERPL-MCNC: 0.3 MG/DL (ref 0.1–1)
BUN SERPL-MCNC: 24 MG/DL (ref 8–23)
CALCIUM SERPL-MCNC: 9.5 MG/DL (ref 8.7–10.5)
CHLORIDE SERPL-SCNC: 106 MMOL/L (ref 95–110)
CO2 SERPL-SCNC: 26 MMOL/L (ref 23–29)
CREAT SERPL-MCNC: 1.1 MG/DL (ref 0.5–1.4)
DIFFERENTIAL METHOD BLD: ABNORMAL
EOSINOPHIL # BLD AUTO: 0.2 K/UL (ref 0–0.5)
EOSINOPHIL NFR BLD: 4.1 % (ref 0–8)
ERYTHROCYTE [DISTWIDTH] IN BLOOD BY AUTOMATED COUNT: 15.9 % (ref 11.5–14.5)
EST. GFR  (NO RACE VARIABLE): 52 ML/MIN/1.73 M^2
GLUCOSE SERPL-MCNC: 161 MG/DL (ref 70–110)
HCT VFR BLD AUTO: 31.8 % (ref 37–48.5)
HGB BLD-MCNC: 10 G/DL (ref 12–16)
IMM GRANULOCYTES # BLD AUTO: 0.03 K/UL (ref 0–0.04)
IMM GRANULOCYTES NFR BLD AUTO: 0.6 % (ref 0–0.5)
LYMPHOCYTES # BLD AUTO: 0.8 K/UL (ref 1–4.8)
LYMPHOCYTES NFR BLD: 16.7 % (ref 18–48)
MCH RBC QN AUTO: 29.1 PG (ref 27–31)
MCHC RBC AUTO-ENTMCNC: 31.4 G/DL (ref 32–36)
MCV RBC AUTO: 92 FL (ref 82–98)
MONOCYTES # BLD AUTO: 0.7 K/UL (ref 0.3–1)
MONOCYTES NFR BLD: 15.1 % (ref 4–15)
NEUTROPHILS # BLD AUTO: 3.1 K/UL (ref 1.8–7.7)
NEUTROPHILS NFR BLD: 63.1 % (ref 38–73)
NRBC BLD-RTO: 0 /100 WBC
PLATELET # BLD AUTO: 276 K/UL (ref 150–450)
PMV BLD AUTO: 11 FL (ref 9.2–12.9)
POTASSIUM SERPL-SCNC: 4.8 MMOL/L (ref 3.5–5.1)
PROT SERPL-MCNC: 6.9 G/DL (ref 6–8.4)
RBC # BLD AUTO: 3.44 M/UL (ref 4–5.4)
SODIUM SERPL-SCNC: 141 MMOL/L (ref 136–145)
WBC # BLD AUTO: 4.91 K/UL (ref 3.9–12.7)

## 2025-03-12 PROCEDURE — 80053 COMPREHEN METABOLIC PANEL: CPT | Mod: HCNC | Performed by: INTERNAL MEDICINE

## 2025-03-12 PROCEDURE — 87116 MYCOBACTERIA CULTURE: CPT | Mod: HCNC | Performed by: INTERNAL MEDICINE

## 2025-03-12 PROCEDURE — 36415 COLL VENOUS BLD VENIPUNCTURE: CPT | Mod: HCNC | Performed by: INTERNAL MEDICINE

## 2025-03-12 PROCEDURE — 87206 SMEAR FLUORESCENT/ACID STAI: CPT | Mod: HCNC | Performed by: INTERNAL MEDICINE

## 2025-03-12 PROCEDURE — 87015 SPECIMEN INFECT AGNT CONCNTJ: CPT | Mod: HCNC | Performed by: INTERNAL MEDICINE

## 2025-03-12 PROCEDURE — 85025 COMPLETE CBC W/AUTO DIFF WBC: CPT | Mod: HCNC | Performed by: INTERNAL MEDICINE

## 2025-03-12 RX ORDER — SOD SULF/POT CHLORIDE/MAG SULF 1.479 G
12 TABLET ORAL DAILY
Qty: 24 TABLET | Refills: 0 | Status: SHIPPED | OUTPATIENT
Start: 2025-03-12

## 2025-03-13 LAB
ACID FAST MOD KINY STN SPEC: NORMAL
MYCOBACTERIUM SPEC QL CULT: NORMAL

## 2025-03-14 ENCOUNTER — TELEPHONE (OUTPATIENT)
Dept: ENDOSCOPY | Facility: HOSPITAL | Age: 75
End: 2025-03-14
Payer: MEDICARE

## 2025-03-16 ENCOUNTER — PATIENT MESSAGE (OUTPATIENT)
Dept: PULMONOLOGY | Facility: CLINIC | Age: 75
End: 2025-03-16
Payer: MEDICARE

## 2025-04-02 ENCOUNTER — TELEPHONE (OUTPATIENT)
Dept: ENDOSCOPY | Facility: HOSPITAL | Age: 75
End: 2025-04-02
Payer: MEDICARE

## 2025-04-02 NOTE — TELEPHONE ENCOUNTER
----- Message from ZACKERY Rosa sent at 3/12/2025  4:25 PM CDT -----  Regardin25 BT  The patient is currently under an internal cardiologist, celia GONZALEZ. Is patient okay to hold blood thinner Plavix (clopidogrel) for their upcoming scheduled Colonoscopy on 25.

## 2025-04-02 NOTE — TELEPHONE ENCOUNTER
Department of Endoscopy      Dear Dr. Bonner,    Your patient, Kylah Deal 1950 is being scheduled for Colonoscopy and our records indicate they are taking Plavix (clopidogrel).    Please indicate if and when the patient can safely stop their medication prior to the procedure by completing one of the following:    Do not discontinue medication: _____    Medication can be discontinued _____ days prior to the procedure.    Please take into consideration that therapeutic maneuvers may be performed during the procedure that requires delay in restarting anticoagulation therapy.      Signature: ______________________________________________ Date:__________________    Please sign and date this letter and return fax to : [phone] If you have any questions or concerns, please call [phone] Monday-Friday, 8:00 am-3:00 pm.     Thank you for your prompt response,    [facility]       Medication                                                                Hold Time                                                                                                                                                        ANTIPLATELETS   Persantine (dipyridamole) 2 days   Aggrenox (ASA/dipyridamole) 2 days     Pletal (cilostazol) 2 days     Plavix (clopidogrel) 5 days     Effient (prasugrel) 5 days     Ticlid (ticlidopine) 10 days     Brilinta (ticagrelor) 3 days     Zontivity (vorapaxar) 5 days   ANTICOAGULANTS   Coumadin (warfarin) 5 days   Lovenox (enoxaparin)  -last dose administered to be 50% of total daily dose 24 hours   Fragmin (dalteparin)   -last dose administered to be 50% of total daily dose 24 hours   Arixtra (fondaparinux) 2 days     Xarelto (rivaroxaban)   2 days     Eliquis (apixaban)   2 days     Savaysa (edoxaban)   2 days     Pradaxa (dabigatran)   2 days     Iprivask (desirudin)   10 hrs

## 2025-04-11 ENCOUNTER — LAB VISIT (OUTPATIENT)
Dept: LAB | Facility: HOSPITAL | Age: 75
End: 2025-04-11
Attending: INTERNAL MEDICINE
Payer: MEDICARE

## 2025-04-11 DIAGNOSIS — D86.0 SARCOIDOSIS OF LUNG: ICD-10-CM

## 2025-04-11 DIAGNOSIS — A31.0 PULMONARY MYCOBACTERIUM AVIUM COMPLEX (MAC) INFECTION: ICD-10-CM

## 2025-04-11 LAB
ABSOLUTE EOSINOPHIL (OHS): 0.33 K/UL
ABSOLUTE MONOCYTE (OHS): 0.57 K/UL (ref 0.3–1)
ABSOLUTE NEUTROPHIL COUNT (OHS): 3.28 K/UL (ref 1.8–7.7)
ALBUMIN SERPL BCP-MCNC: 3.4 G/DL (ref 3.5–5.2)
ALP SERPL-CCNC: 73 UNIT/L (ref 40–150)
ALT SERPL W/O P-5'-P-CCNC: 46 UNIT/L (ref 10–44)
ANION GAP (OHS): 7 MMOL/L (ref 8–16)
AST SERPL-CCNC: 22 UNIT/L (ref 11–45)
BASOPHILS # BLD AUTO: 0.03 K/UL
BASOPHILS NFR BLD AUTO: 0.6 %
BILIRUB SERPL-MCNC: 0.3 MG/DL (ref 0.1–1)
BUN SERPL-MCNC: 35 MG/DL (ref 8–23)
CALCIUM SERPL-MCNC: 9.4 MG/DL (ref 8.7–10.5)
CHLORIDE SERPL-SCNC: 107 MMOL/L (ref 95–110)
CO2 SERPL-SCNC: 27 MMOL/L (ref 23–29)
CREAT SERPL-MCNC: 1.1 MG/DL (ref 0.5–1.4)
ERYTHROCYTE [DISTWIDTH] IN BLOOD BY AUTOMATED COUNT: 16.1 % (ref 11.5–14.5)
GFR SERPLBLD CREATININE-BSD FMLA CKD-EPI: 52 ML/MIN/1.73/M2
GLUCOSE SERPL-MCNC: 140 MG/DL (ref 70–110)
HCT VFR BLD AUTO: 33.9 % (ref 37–48.5)
HGB BLD-MCNC: 10.5 GM/DL (ref 12–16)
IMM GRANULOCYTES # BLD AUTO: 0.03 K/UL (ref 0–0.04)
IMM GRANULOCYTES NFR BLD AUTO: 0.6 % (ref 0–0.5)
LYMPHOCYTES # BLD AUTO: 1.06 K/UL (ref 1–4.8)
MCH RBC QN AUTO: 29.2 PG (ref 27–31)
MCHC RBC AUTO-ENTMCNC: 31 G/DL (ref 32–36)
MCV RBC AUTO: 94 FL (ref 82–98)
NUCLEATED RBC (/100WBC) (OHS): 0 /100 WBC
PLATELET # BLD AUTO: 232 K/UL (ref 150–450)
PMV BLD AUTO: 11 FL (ref 9.2–12.9)
POTASSIUM SERPL-SCNC: 4.9 MMOL/L (ref 3.5–5.1)
PROT SERPL-MCNC: 6.9 GM/DL (ref 6–8.4)
RBC # BLD AUTO: 3.59 M/UL (ref 4–5.4)
RELATIVE EOSINOPHIL (OHS): 6.2 %
RELATIVE LYMPHOCYTE (OHS): 20 % (ref 18–48)
RELATIVE MONOCYTE (OHS): 10.8 % (ref 4–15)
RELATIVE NEUTROPHIL (OHS): 61.8 % (ref 38–73)
SODIUM SERPL-SCNC: 141 MMOL/L (ref 136–145)
WBC # BLD AUTO: 5.3 K/UL (ref 3.9–12.7)

## 2025-04-11 PROCEDURE — 87116 MYCOBACTERIA CULTURE: CPT | Mod: HCNC

## 2025-04-11 PROCEDURE — 85025 COMPLETE CBC W/AUTO DIFF WBC: CPT | Mod: HCNC

## 2025-04-11 PROCEDURE — 82310 ASSAY OF CALCIUM: CPT | Mod: HCNC

## 2025-04-11 PROCEDURE — 36415 COLL VENOUS BLD VENIPUNCTURE: CPT | Mod: HCNC

## 2025-04-11 PROCEDURE — 87206 SMEAR FLUORESCENT/ACID STAI: CPT | Mod: HCNC

## 2025-04-14 LAB — ACID FAST MOD KINY STN SPEC: NORMAL

## 2025-04-25 ENCOUNTER — LAB VISIT (OUTPATIENT)
Dept: LAB | Facility: HOSPITAL | Age: 75
End: 2025-04-25
Attending: INTERNAL MEDICINE
Payer: MEDICARE

## 2025-04-25 ENCOUNTER — PATIENT MESSAGE (OUTPATIENT)
Dept: HEMATOLOGY/ONCOLOGY | Facility: CLINIC | Age: 75
End: 2025-04-25
Payer: MEDICARE

## 2025-04-25 ENCOUNTER — OFFICE VISIT (OUTPATIENT)
Dept: OTOLARYNGOLOGY | Facility: CLINIC | Age: 75
End: 2025-04-25
Payer: MEDICARE

## 2025-04-25 ENCOUNTER — PATIENT MESSAGE (OUTPATIENT)
Dept: OTOLARYNGOLOGY | Facility: CLINIC | Age: 75
End: 2025-04-25

## 2025-04-25 VITALS
HEIGHT: 61 IN | SYSTOLIC BLOOD PRESSURE: 146 MMHG | DIASTOLIC BLOOD PRESSURE: 75 MMHG | BODY MASS INDEX: 21.39 KG/M2 | WEIGHT: 113.31 LBS

## 2025-04-25 DIAGNOSIS — J34.3 NASAL TURBINATE HYPERTROPHY: ICD-10-CM

## 2025-04-25 DIAGNOSIS — D86.9 SARCOID: ICD-10-CM

## 2025-04-25 DIAGNOSIS — R04.0 EPISTAXIS: ICD-10-CM

## 2025-04-25 DIAGNOSIS — K12.1 ORAL ULCER: ICD-10-CM

## 2025-04-25 DIAGNOSIS — J30.2 SEASONAL ALLERGIC RHINITIS, UNSPECIFIED TRIGGER: ICD-10-CM

## 2025-04-25 DIAGNOSIS — J34.2 DEVIATED NASAL SEPTUM: ICD-10-CM

## 2025-04-25 DIAGNOSIS — R09.81 NASAL CONGESTION: Primary | ICD-10-CM

## 2025-04-25 DIAGNOSIS — D47.2 MGUS (MONOCLONAL GAMMOPATHY OF UNKNOWN SIGNIFICANCE): ICD-10-CM

## 2025-04-25 DIAGNOSIS — J30.0 VASOMOTOR RHINITIS: ICD-10-CM

## 2025-04-25 LAB
ABSOLUTE EOSINOPHIL (OHS): 0.13 K/UL
ABSOLUTE MONOCYTE (OHS): 0.66 K/UL (ref 0.3–1)
ABSOLUTE NEUTROPHIL COUNT (OHS): 3.99 K/UL (ref 1.8–7.7)
ALBUMIN SERPL BCP-MCNC: 3.4 G/DL (ref 3.5–5.2)
ALP SERPL-CCNC: 72 UNIT/L (ref 40–150)
ALT SERPL W/O P-5'-P-CCNC: 17 UNIT/L (ref 10–44)
ANION GAP (OHS): 8 MMOL/L (ref 8–16)
AST SERPL-CCNC: 17 UNIT/L (ref 11–45)
BASOPHILS # BLD AUTO: 0.02 K/UL
BASOPHILS NFR BLD AUTO: 0.3 %
BILIRUB SERPL-MCNC: 0.4 MG/DL (ref 0.1–1)
BUN SERPL-MCNC: 21 MG/DL (ref 8–23)
CALCIUM SERPL-MCNC: 9.5 MG/DL (ref 8.7–10.5)
CHLORIDE SERPL-SCNC: 106 MMOL/L (ref 95–110)
CO2 SERPL-SCNC: 27 MMOL/L (ref 23–29)
CREAT SERPL-MCNC: 1 MG/DL (ref 0.5–1.4)
ERYTHROCYTE [DISTWIDTH] IN BLOOD BY AUTOMATED COUNT: 15.7 % (ref 11.5–14.5)
GFR SERPLBLD CREATININE-BSD FMLA CKD-EPI: 59 ML/MIN/1.73/M2
GLUCOSE SERPL-MCNC: 115 MG/DL (ref 70–110)
HCT VFR BLD AUTO: 34.7 % (ref 37–48.5)
HGB BLD-MCNC: 10.8 GM/DL (ref 12–16)
IMM GRANULOCYTES # BLD AUTO: 0.03 K/UL (ref 0–0.04)
IMM GRANULOCYTES NFR BLD AUTO: 0.5 % (ref 0–0.5)
LYMPHOCYTES # BLD AUTO: 0.99 K/UL (ref 1–4.8)
MCH RBC QN AUTO: 29.1 PG (ref 27–31)
MCHC RBC AUTO-ENTMCNC: 31.1 G/DL (ref 32–36)
MCV RBC AUTO: 94 FL (ref 82–98)
NUCLEATED RBC (/100WBC) (OHS): 0 /100 WBC
PLATELET # BLD AUTO: 228 K/UL (ref 150–450)
PMV BLD AUTO: 10.8 FL (ref 9.2–12.9)
POTASSIUM SERPL-SCNC: 4.7 MMOL/L (ref 3.5–5.1)
PROT SERPL-MCNC: 7.2 GM/DL (ref 6–8.4)
RBC # BLD AUTO: 3.71 M/UL (ref 4–5.4)
RELATIVE EOSINOPHIL (OHS): 2.2 %
RELATIVE LYMPHOCYTE (OHS): 17 % (ref 18–48)
RELATIVE MONOCYTE (OHS): 11.3 % (ref 4–15)
RELATIVE NEUTROPHIL (OHS): 68.7 % (ref 38–73)
SODIUM SERPL-SCNC: 141 MMOL/L (ref 136–145)
WBC # BLD AUTO: 5.82 K/UL (ref 3.9–12.7)

## 2025-04-25 PROCEDURE — 82435 ASSAY OF BLOOD CHLORIDE: CPT | Mod: HCNC

## 2025-04-25 PROCEDURE — 36415 COLL VENOUS BLD VENIPUNCTURE: CPT | Mod: HCNC

## 2025-04-25 PROCEDURE — 1101F PT FALLS ASSESS-DOCD LE1/YR: CPT | Mod: CPTII,S$GLB,, | Performed by: OTOLARYNGOLOGY

## 2025-04-25 PROCEDURE — 31231 NASAL ENDOSCOPY DX: CPT | Mod: S$GLB,,, | Performed by: OTOLARYNGOLOGY

## 2025-04-25 PROCEDURE — 1126F AMNT PAIN NOTED NONE PRSNT: CPT | Mod: CPTII,S$GLB,, | Performed by: OTOLARYNGOLOGY

## 2025-04-25 PROCEDURE — 3077F SYST BP >= 140 MM HG: CPT | Mod: CPTII,S$GLB,, | Performed by: OTOLARYNGOLOGY

## 2025-04-25 PROCEDURE — 3288F FALL RISK ASSESSMENT DOCD: CPT | Mod: CPTII,S$GLB,, | Performed by: OTOLARYNGOLOGY

## 2025-04-25 PROCEDURE — 3078F DIAST BP <80 MM HG: CPT | Mod: CPTII,S$GLB,, | Performed by: OTOLARYNGOLOGY

## 2025-04-25 PROCEDURE — 99214 OFFICE O/P EST MOD 30 MIN: CPT | Mod: 25,S$GLB,, | Performed by: OTOLARYNGOLOGY

## 2025-04-25 PROCEDURE — 85025 COMPLETE CBC W/AUTO DIFF WBC: CPT | Mod: HCNC

## 2025-04-25 RX ORDER — IPRATROPIUM BROMIDE 21 UG/1
2 SPRAY, METERED NASAL 3 TIMES DAILY PRN
Qty: 30 ML | Refills: 1 | Status: SHIPPED | OUTPATIENT
Start: 2025-04-25

## 2025-04-25 RX ORDER — PREDNISOLONE 15 MG/5ML
15 SOLUTION ORAL 2 TIMES DAILY
Qty: 140 ML | Refills: 0 | Status: SHIPPED | OUTPATIENT
Start: 2025-04-25 | End: 2025-05-09

## 2025-04-25 RX ORDER — NYSTATIN 100000 [USP'U]/ML
4 SUSPENSION ORAL 4 TIMES DAILY
Qty: 224 ML | Refills: 0 | Status: SHIPPED | OUTPATIENT
Start: 2025-04-25 | End: 2025-05-09

## 2025-04-25 NOTE — PROGRESS NOTES
OTOLARYNGOLOGY CLINIC NOTE  Date:  04/25/2025     Chief complaint:  Chief Complaint   Patient presents with    Nasal Congestion     Still sick with nasal congestion    Cough    Epistaxis     With nasal crusting better    Mouth Injury     Lower left gum line been hurting dentist said it looked irritated but healing pt stated its getting worse       History of Present Illness  Kylah Deal is a 74 y.o. female  presenting today for a followup.  Lots of rhinitis  Lower left side of mouth under teeth ulcer has been there for about 2-3 weeks   Carbonated drinks sweet and salty burn the mouth really bad.   Saw dentist and told it looked like it was healing   At times area throbs . Dentist said teeth themselves looked ok     Cough has been worsening, she is not sure if exacerbation of sarcoid or MAC but waking up with cough and phlegm   Plan was to stop the steroid but symptoms come back when stops it  Gets sob and fatigue even on the 5 mg dose ; had been doing well up until 10 days ago   Nasal sprays havbe been helping   Saline Flonase and astelin bid most time sate last once a day     I last saw the patient on 12-23 - 24. Below text is copied from  note on that date describing history of present illness at that time :Has not had any large bleeds ; on occasion feels nose running and blots nose and blood in the snot.  Has been going on from mid to late September and also had drip; shortly after started on astelin started having nosebleeds. Sometimes goes a few days without bleeding. Sometimes when blows nose in bright red blood. Sometimes gets kleenex and dabs in nose; she can feel the scab in   Sometimes gets clots that come out - has flores a week to 10 days  Does not really dripout the front of  ; nose is wet      Had been on flonase at time used astelin but not recently  Has been on singulair but still gets congestion and sinus infections- usually has problem in the winter     Has cough issues - has not done her nebulizer  this am      Had done zyrtec and flonase in the past and that did help     It is typically the right side ; on occasion gets a tinge from the left but clots and scabbing on the right   Had issues with gerd and had been on pantoprazole and past year had to change insurance for pulm issues just ran out of pantoprazole a couple of days ago usually when would go off of it would have take mylanta at night      Has a history of sarcoid and MAC- was diagnosed in January       Past Medical History  Past Medical History:   Diagnosis Date    CAD (coronary artery disease)     Diabetes mellitus     Hypertension     Migraine     Seizures         Past Surgical History  Past Surgical History:   Procedure Laterality Date    APPENDECTOMY      CHOLECYSTECTOMY      CORONARY ANGIOPLASTY WITH STENT PLACEMENT  2017    CORONARY ARTERY BYPASS GRAFT      ENDOSCOPIC ULTRASOUND OF UPPER GASTROINTESTINAL TRACT N/A 04/03/2024    Procedure: ULTRASOUND, UPPER GI TRACT, ENDOSCOPIC;  Surgeon: Lavell Lomax MD;  Location: [facility] ENDO;  Service: Endoscopy;  Laterality: N/A;  4/1 portal- EUS-guided biopsy of anastacia hepatis mass.    HEEL SPUR EXCISION  1997    HIP FRACTURE SURGERY Left 2018    HYSTERECTOMY      TONSILLECTOMY      URETEROSCOPIC REMOVAL OF URETERIC CALCULUS Right 10/3/2024    Procedure: REMOVAL, CALCULUS, URETER, URETEROSCOPIC Laser lithotripsy Retrograde pyelogram Ureteral stent placement;  Surgeon: Annamarie Lake MD;  Location: [facility] OR;  Service: Urology;  Laterality: Right;  holmium laser 365 madrigal, time 2:13, energy 0.76kj    URETHRAL SLING  2022    WRIST SURGERY Right 2005        Medications  Medications Ordered Prior to Encounter[1]    Review of Systems  Review of Systems   Constitutional:  Positive for malaise/fatigue.   Eyes: Negative.    Respiratory:  Positive for cough and shortness of breath.    Cardiovascular: Negative.    Gastrointestinal:  Positive for constipation.   Musculoskeletal:  Positive for back pain.   Skin:  "Negative.    Psychiatric/Behavioral: Negative.            Answers submitted by the patient for this visit:  Review of Symptoms Questionnaire  (Submitted on 4/24/2025)  mouth sores: Yes  trouble swallowing: Yes  Difficulty urinating?: Yes  Seasonal Allergies?: Yes  Cold all of the time? : Yes  Light-headedness: Yes    Social History   reports that she quit smoking about 17 years ago. Her smoking use included cigarettes. She has never used smokeless tobacco. She reports current alcohol use of about 1.0 standard drink of alcohol per week. She reports that she does not use drugs.     Family History  Family History   Problem Relation Name Age of Onset    Lung cancer Father      Breast cancer Sister      Cancer Maternal Uncle      Cancer Paternal Aunt      Cancer Maternal Grandmother          Physical Exam   Vitals:    04/25/25 1405   BP: (!) 146/75    Body mass index is 21.41 kg/m².  Weight: 51.4 kg (113 lb 5.1 oz)   Height: 5' 1" (154.9 cm)     GENERAL: no acute distress.  HEAD: normocephalic.   EYES: No scleral icterus  EARS: external ear without lesion, normal pinna shape and position.  External auditory canal with normal cerumen, tympanic membrane fully visible, no perforation , no retraction. No middle ear effusion. Ossicles intact.   NOSE: external nose without significant bony abnormality, inferior turbinates hypertrophied  on anterior rhinoscopy   ORAL CAVITY/OROPHARYNX: tongue mobile. Ulcer along alveolar ridge on left side near back , tender   NECK: trachea midline.   LYMPH NODES:No cervical lymphadenopathy.  RESPIRATORY: no stridor, no stertor. Voice normal. Respirations nonlabored.  NEURO: alert, responds to questions appropriately.    PSYCH:mood appropriate    PROCEDURE NOTE  Procedure: diagnostic rigid nasal endoscopy  Indications for procedure: chronic nasal congestion, unable to view sinus area and/or pathology noted on anterior rhinoscopy requiring more thorough nasal evaluation       Consent: procedure " was explained in detail and verbal consent was obtained.   Anesthesia:4% lidocaine with neosynephrine  Procedure in detail: With the patient in the seated position, the zero degree endoscope was inserted atraumatically into the bilateral nasal cavities and advance to the nasopharynx with the following areas examined with findings as described below.     Nasal cavity:no polyps or mass, no purulent drainage, no bleeding  Septum: no perforation; caudal deviation to right and then at mid septum left and then back to right mid-posterior setpum with small spur right mid posterior septum   Turbinates:  inferior turbinates hypertrophied with boggy edema; middle turbinates without erythema nor crusting  Middle Meati: moderate edema, no polyps nor purulent drainage  Spheno-ethmoid recess: mild edema  Superior meatus:mild edema  Nasopharynx: no mass or lesion in the nasopharynx.     The scope was removed atraumatically without complication. The patient tolerated the procedure well. Photodocumentation obtained , all images and/or videos uploaded in media section of epic.    RIGHT                  LEFT                        Imaging:  The patient does not have any new imaging of the head and neck since last visit.     Labs:  CBC  Recent Labs   Lab 03/12/25  0920 04/11/25  0849 04/25/25  1003   WBC 4.91 5.30 5.82   Hemoglobin 10.0 L  --   --    HGB  --  10.5 L 10.8 L   Hematocrit 31.8 L  --   --    HCT  --  33.9 L 34.7 L   MCV 92 94 94   Platelet Count  --  232 228   Platelets 276  --   --      BMP  Recent Labs   Lab 11/06/24  1926 11/07/24  0835 01/27/25  1038 02/12/25  0904 03/12/25  0920 04/11/25  0849 04/25/25  1003   Glucose 109   < > 116 H 168 H 161 H  --   --    Sodium 139   < > 140 140 141 141 141   Potassium 4.2   < > 5.1 4.8 4.8 4.9 4.7   Chloride 103   < >  --  106 106 107 106   CO2 26   < >  --  24 26 27 27   Carbon Dioxide  --    < > 30  --   --   --   --    BUN 18   < >  --  24 H 24 H 35 H 21   Blood Urea Nitrogen   --    < > 17  --   --   --   --    Creatinine 1.1   < > 0.88 1.1 1.1 1.1 1.0   Calcium 9.7   < > 9.3 9.6 9.5 9.4 9.5   Magnesium 1.9  --   --   --   --   --   --     < > = values in this interval not displayed.     COAGS  Recent Labs   Lab 02/22/24  1223 03/27/24  1251 05/23/24  0922   INR 1.0 1.0 0.9       Assessment  1. Nasal congestion    2. Nasal turbinate hypertrophy    3. Sarcoid    4. Epistaxis    5. Seasonal allergic rhinitis, unspecified trigger    6. Deviated nasal septum    7. Oral ulcer    8. Vasomotor rhinitis       Plan:  Discussed plan of care with patient in detail and all questions answered. Patient reported understanding of plan of care. I gave the patient the opportunity to ask questions and patient confirmed all questions answered to satisfaction.     Saline rinse   Trial of atrovent  A lot of congestion on scope today but may be viral thing she has   Advised to reach out to dr corona office regarding cough . If can't get in touch let me know   Ulcer - prednisolone and nystatin to prevent thrush on prednisolone rinse.     F/u 2 weeks if not healed will biopsy                [1]   Current Outpatient Medications on File Prior to Visit   Medication Sig Dispense Refill    albuterol (PROVENTIL) 2.5 mg /3 mL (0.083 %) nebulizer solution Take 3 mLs (2.5 mg total) by nebulization every 6 (six) hours as needed for Wheezing. Rescue 120 mL 5    aspirin (ECOTRIN) 81 MG EC tablet Take 81 mg by mouth once daily.      azithromycin (ZITHROMAX) 500 MG tablet Take 1 tablet (500 mg total) by mouth every Mon, Wed, Fri. 12 tablet 11    blood sugar diagnostic Strp Check blood glucose 4 times a day      budesonide-formoterol 160-4.5 mcg (SYMBICORT) 160-4.5 mcg/actuation HFAA Inhale 2 puffs into the lungs every 12 (twelve) hours. Controller 10.2 g 3    carvediloL (COREG) 12.5 MG tablet Take 1 tablet by mouth 2 (two) times daily with meals.      cetirizine (ZYRTEC) 10 MG tablet Take 1 tablet (10 mg total) by mouth once  "daily. 30 tablet 11    clopidogreL (PLAVIX) 75 mg tablet Take 1 tablet by mouth once daily.      denosumab (PROLIA) 60 mg/mL Syrg Inject 60 mg into the skin every 6 (six) months.      DROPLET PEN NEEDLE 32 gauge x 5/32" Ndle USE 1 EACH BY MISC ROUTE FOUR TIMES DAILY      ethambutoL (MYAMBUTOL) 400 MG Tab Take 3 tablets (1,200 mg total) by mouth every Mon, Wed, Fri. 36 tablet 11    evolocumab (REPATHA SURECLICK SUBQ) Inject into the skin.      ezetimibe (ZETIA) 10 mg tablet Take 10 mg by mouth once daily.      fluticasone (VERAMYST) 27.5 mcg/actuation nasal spray 2 sprays by Nasal route once daily.      folic acid (FOLVITE) 1 MG tablet Take 1 tablet (1 mg total) by mouth once daily. 360 tablet 0    hydrALAZINE (APRESOLINE) 50 MG tablet Take 1 tablet by mouth 2 (two) times daily.      insulin aspart U-100 (NOVOLOG) 100 unit/mL (3 mL) InPn pen Inject 2 Units into the skin as needed.      levETIRAcetam (KEPPRA) 500 MG Tab Take 1 tablet (500 mg total) by mouth 2 (two) times daily. 180 tablet 3    linaGLIPtin (TRADJENTA) 5 mg Tab tablet Take 5 mg by mouth once daily.      lisinopriL (PRINIVIL,ZESTRIL) 40 MG tablet Take 1 tablet by mouth once daily.      methotrexate 2.5 MG Tab Take 6 tablets (15 mg total) by mouth every 7 days. 72 tablet 0    montelukast (SINGULAIR) 10 mg tablet Take 10 mg by mouth.      nitroGLYCERIN (NITROSTAT) 0.4 MG SL tablet Place 0.4 mg under the tongue.      pantoprazole (PROTONIX) 40 MG tablet Take 1 tablet (40 mg total) by mouth once daily. Take 30 min before a meal 90 tablet 3    predniSONE (DELTASONE) 5 MG tablet Take 1 tablet (5 mg total) by mouth once daily. 30 tablet 0    rifabutin (MYCOBUTIN) 150 mg Cap Take 2 capsules (300 mg total) by mouth every Mon, Wed, Fri. 24 capsule 11    sod sulf-pot chloride-mag sulf (SUTAB) 1.479-0.188- 0.225 gram tablet Take 12 tablets by mouth once daily. Please follow Endoscopy 's instructions. Please apply coupon code. Please apply coupon code. BIN: " [id number], PCN: [id number], GROUP: [id number], MEMBER ID: [id number] 24 tablet 0    TRESIBA FLEXTOUCH U-100 100 unit/mL (3 mL) insulin pen SMARTSIG:3 Unit(s) SUB-Q Daily      predniSONE (DELTASONE) 10 MG tablet Take 0.5 tablets (5 mg total) by mouth once daily. 10 mg daily for 2 weeks; 7.5 mg daily for 1 - 2 weeks, then 5 mg daily after. (Patient not taking: Reported on 3/10/2025) 100 tablet 0     No current facility-administered medications on file prior to visit.

## 2025-04-28 ENCOUNTER — TELEPHONE (OUTPATIENT)
Dept: OTOLARYNGOLOGY | Facility: CLINIC | Age: 75
End: 2025-04-28
Payer: MEDICARE

## 2025-04-28 NOTE — TELEPHONE ENCOUNTER
Spoke with pt states was told to rinse both meds but on the steroid says to take by mouth and make sure take with food, informed pt to do as instructed in office to use as a rinse. Pt verb understanding

## 2025-04-28 NOTE — TELEPHONE ENCOUNTER
----- Message from Yumiko sent at 4/28/2025  8:18 AM CDT -----  .Type: Patient Call BackWho called: Self What is the request in detail: Have questions about medication. Ask that the nurse give her a call Can the clinic reply by [facility]? No Would the patient rather a call back or a response via [facility]? Call BACK Best call back number: .[phone] (home) Additional Information:

## 2025-04-29 ENCOUNTER — OFFICE VISIT (OUTPATIENT)
Dept: INFECTIOUS DISEASES | Facility: CLINIC | Age: 75
End: 2025-04-29
Payer: MEDICARE

## 2025-04-29 ENCOUNTER — HOSPITAL ENCOUNTER (OUTPATIENT)
Dept: RADIOLOGY | Facility: HOSPITAL | Age: 75
Discharge: HOME | End: 2025-04-29
Attending: INTERNAL MEDICINE
Payer: MEDICARE

## 2025-04-29 ENCOUNTER — ANESTHESIA EVENT (OUTPATIENT)
Dept: ENDOSCOPY | Facility: HOSPITAL | Age: 75
End: 2025-04-29

## 2025-04-29 ENCOUNTER — TELEPHONE (OUTPATIENT)
Dept: INFECTIOUS DISEASES | Facility: CLINIC | Age: 75
End: 2025-04-29

## 2025-04-29 VITALS
DIASTOLIC BLOOD PRESSURE: 74 MMHG | BODY MASS INDEX: 20.85 KG/M2 | HEART RATE: 69 BPM | SYSTOLIC BLOOD PRESSURE: 162 MMHG | HEIGHT: 61 IN | TEMPERATURE: 98 F | WEIGHT: 110.44 LBS

## 2025-04-29 DIAGNOSIS — A31.0 MYCOBACTERIUM AVIUM COMPLEX: Primary | ICD-10-CM

## 2025-04-29 DIAGNOSIS — A31.0 PULMONARY MYCOBACTERIUM AVIUM COMPLEX (MAC) INFECTION: ICD-10-CM

## 2025-04-29 DIAGNOSIS — A31.0 MYCOBACTERIUM AVIUM COMPLEX: ICD-10-CM

## 2025-04-29 DIAGNOSIS — R05.9 COUGH, UNSPECIFIED TYPE: ICD-10-CM

## 2025-04-29 PROCEDURE — 71046 X-RAY EXAM CHEST 2 VIEWS: CPT | Mod: TC,HCNC

## 2025-04-29 PROCEDURE — 3078F DIAST BP <80 MM HG: CPT | Mod: CPTII,HCNC,S$GLB, | Performed by: INTERNAL MEDICINE

## 2025-04-29 PROCEDURE — 87116 MYCOBACTERIA CULTURE: CPT | Mod: HCNC

## 2025-04-29 PROCEDURE — 1159F MED LIST DOCD IN RCRD: CPT | Mod: CPTII,HCNC,S$GLB, | Performed by: INTERNAL MEDICINE

## 2025-04-29 PROCEDURE — 3288F FALL RISK ASSESSMENT DOCD: CPT | Mod: CPTII,HCNC,S$GLB, | Performed by: INTERNAL MEDICINE

## 2025-04-29 PROCEDURE — 87206 SMEAR FLUORESCENT/ACID STAI: CPT | Mod: HCNC

## 2025-04-29 PROCEDURE — 1101F PT FALLS ASSESS-DOCD LE1/YR: CPT | Mod: CPTII,HCNC,S$GLB, | Performed by: INTERNAL MEDICINE

## 2025-04-29 PROCEDURE — 99214 OFFICE O/P EST MOD 30 MIN: CPT | Mod: HCNC,S$GLB,, | Performed by: INTERNAL MEDICINE

## 2025-04-29 PROCEDURE — 1126F AMNT PAIN NOTED NONE PRSNT: CPT | Mod: CPTII,HCNC,S$GLB, | Performed by: INTERNAL MEDICINE

## 2025-04-29 PROCEDURE — G2211 COMPLEX E/M VISIT ADD ON: HCPCS | Mod: HCNC,S$GLB,, | Performed by: INTERNAL MEDICINE

## 2025-04-29 PROCEDURE — 99999 PR PBB SHADOW E&M-EST. PATIENT-LVL V: CPT | Mod: PBBFAC,HCNC,, | Performed by: INTERNAL MEDICINE

## 2025-04-29 PROCEDURE — 3077F SYST BP >= 140 MM HG: CPT | Mod: CPTII,HCNC,S$GLB, | Performed by: INTERNAL MEDICINE

## 2025-04-29 PROCEDURE — 71046 X-RAY EXAM CHEST 2 VIEWS: CPT | Mod: 26,HCNC,, | Performed by: RADIOLOGY

## 2025-04-29 RX ORDER — AMOXICILLIN AND CLAVULANATE POTASSIUM 875; 125 MG/1; MG/1
1 TABLET, FILM COATED ORAL 2 TIMES DAILY
Qty: 10 TABLET | Refills: 0 | Status: SHIPPED | OUTPATIENT
Start: 2025-04-29 | End: 2025-05-04

## 2025-04-29 RX ORDER — PREDNISONE 5 MG/1
5 TABLET ORAL DAILY
Qty: 7 TABLET | Refills: 0 | Status: SHIPPED | OUTPATIENT
Start: 2025-04-29 | End: 2025-05-06

## 2025-04-29 NOTE — PROGRESS NOTES
"Infectious Disease Clinic Note  04/29/2025       Subjective:       Patient ID: Kylah Deal is a 74 y.o. female being seen for an new visit.    Chief Complaint: Follow-up    HPI  75y/o F pt with hx of T2DM, pulmonary sarcoid on MTX 15mg daily, CAD, HTN who was referred for pulm MAC.    Interval hx 4/29:  Reports she has been experiencing worsening respi symptoms, cough, fatigue, sob for the past 11-12 days. Also with some ongoing nasal congestion. Was febrile on sat. No fevers since. Reports she was supposed to come off prednisone, but bc of these symptoms her pulm recommended a steroid taper- last dose anastasia. However, she is concerned her symptoms will worsen if she discontinues her pred (5mg) and her pulmonologist is no longer at [facility].       Additionally , reports new  issues with her gums for the past month- left side of mandible. Saw dentist after about 2 wks. He saw some irritation that appeared to be healing. Also saw ENT 4/25 and she thought it looked like an ulcer. Pain worsened and was radiating to jaw at the time. ENT prescribed prednisone rinse and   nystatin to avoid thrush, which she started last Friday. Has f/u in 1 wk and plan is for biopsy if does not improve. Does not feel much improvement so far. Was also prescribed a nasal spray for congestion.        Reports she is tolerating mac triple therapy. Has on and off blurry vision due to dry eyes that resolves quickly. Denies hemoptysis, diarrhea.   AFB cx 3/12 NGTD  AFB cx 4/11 "in process"      Interval hx 1/15/25:  Has been feeling well. Notes cough has improved and is producing less sputum. Has been doing the nebulizer treatments. Is still on prednisone taper- currently on 5mg. No recent fevers, chills  or other signs of acute illness. Has been three times weekly azithromycin/ rifabutin/ ethambutol. Is tolerating well.        Initial visit:  Had started treatment for sarcoid, but was still having worsening cough and shortness of breath, so her " "pulmonologist decided to send afb cultures to [facility] for NTM.     No known hx of positive TB tests or known exposures.    Notes she was sick on 10/31 and was given 5 day course azithro.  Presented to ED on . CT concerning for progression of sarcoid. Denies signs or symptoms of systemic infection.     Reports she coughs every day throughout the day, it wakes her up from sleep.  Has been doing hypertonic saline nebs, which she reports does help her cough up phlegm. Has seen blood tinged sputum, but very rare.    AFB sputum 24, 24 and 24 grew MAC (S) Clarithro    CTA chest from 24 revealed "There is near complete right middle lobe atelectasis. There are extensive perilymphatic nodular and confluent opacities, mostly in the perihilar regions but present throughout the bilateral lungs. Findings have progressed in the left lower lobe and the anterior right upper lobe. "    QTc 422    Family History   Problem Relation Name Age of Onset    Lung cancer Father      Breast cancer Sister      Cancer Maternal Uncle      Cancer Paternal Aunt      Cancer Maternal Grandmother       Social History     Socioeconomic History    Marital status:    Tobacco Use    Smoking status: Former     Current packs/day: 0.00     Types: Cigarettes     Quit date:      Years since quittin.3    Smokeless tobacco: Never   Substance and Sexual Activity    Alcohol use: Yes     Alcohol/week: 1.0 standard drink of alcohol     Types: 1 Glasses of wine per week    Drug use: Never     Social Drivers of Health     Financial Resource Strain: Low Risk  (2025)    Overall Financial Resource Strain (CARDIA)     Difficulty of Paying Living Expenses: Not very hard   Food Insecurity: No Food Insecurity (2025)    Hunger Vital Sign     Worried About Running Out of Food in the Last Year: Never true     Ran Out of Food in the Last Year: Never true   Transportation Needs: No Transportation Needs (2025)    PRAPARE - " Transportation     Lack of Transportation (Medical): No     Lack of Transportation (Non-Medical): No   Physical Activity: Insufficiently Active (2/12/2025)    Exercise Vital Sign     Days of Exercise per Week: 1 day     Minutes of Exercise per Session: 20 min   Stress: No Stress Concern Present (2/12/2025)    [employer] - Occupational Stress Questionnaire     Feeling of Stress : Not at all   Housing Stability: Low Risk  (2/12/2025)    Housing Stability Vital Sign     Unable to Pay for Housing in the Last Year: No     Homeless in the Last Year: No     Past Surgical History:   Procedure Laterality Date    APPENDECTOMY      CHOLECYSTECTOMY      CORONARY ANGIOPLASTY WITH STENT PLACEMENT  2017    CORONARY ARTERY BYPASS GRAFT      ENDOSCOPIC ULTRASOUND OF UPPER GASTROINTESTINAL TRACT N/A 04/03/2024    Procedure: ULTRASOUND, UPPER GI TRACT, ENDOSCOPIC;  Surgeon: Lavell Lomax MD;  Location: [facility] ENDO;  Service: Endoscopy;  Laterality: N/A;  4/1 portal- EUS-guided biopsy of anastacia hepatis mass.    HEEL SPUR EXCISION  1997    HIP FRACTURE SURGERY Left 2018    HYSTERECTOMY      TONSILLECTOMY      URETEROSCOPIC REMOVAL OF URETERIC CALCULUS Right 10/3/2024    Procedure: REMOVAL, CALCULUS, URETER, URETEROSCOPIC Laser lithotripsy Retrograde pyelogram Ureteral stent placement;  Surgeon: Annamarie Lake MD;  Location: [facility] OR;  Service: Urology;  Laterality: Right;  holmium laser 365 madrigal, time 2:13, energy 0.76kj    URETHRAL SLING  2022    WRIST SURGERY Right 2005       Patient's Medications   New Prescriptions    No medications on file   Previous Medications    ALBUTEROL (PROVENTIL) 2.5 MG /3 ML (0.083 %) NEBULIZER SOLUTION    Take 3 mLs (2.5 mg total) by nebulization every 6 (six) hours as needed for Wheezing. Rescue    ASPIRIN (ECOTRIN) 81 MG EC TABLET    Take 81 mg by mouth once daily.    AZITHROMYCIN (ZITHROMAX) 500 MG TABLET    Take 1 tablet (500 mg total) by mouth every Mon, Wed, Fri.     "BLOOD SUGAR DIAGNOSTIC STRP    Check blood glucose 4 times a day    BUDESONIDE-FORMOTEROL 160-4.5 MCG (SYMBICORT) 160-4.5 MCG/ACTUATION HFAA    Inhale 2 puffs into the lungs every 12 (twelve) hours. Controller    CARVEDILOL (COREG) 12.5 MG TABLET    Take 1 tablet by mouth 2 (two) times daily with meals.    CETIRIZINE (ZYRTEC) 10 MG TABLET    Take 1 tablet (10 mg total) by mouth once daily.    CLOPIDOGREL (PLAVIX) 75 MG TABLET    Take 1 tablet by mouth once daily.    DENOSUMAB (PROLIA) 60 MG/ML SYRG    Inject 60 mg into the skin every 6 (six) months.    DROPLET PEN NEEDLE 32 GAUGE X 5/32" NDLE    USE 1 EACH BY MISC ROUTE FOUR TIMES DAILY    ETHAMBUTOL (MYAMBUTOL) 400 MG TAB    Take 3 tablets (1,200 mg total) by mouth every Mon, Wed, Fri.    EVOLOCUMAB (REPATHA SURECLICK SUBQ)    Inject into the skin.    EZETIMIBE (ZETIA) 10 MG TABLET    Take 10 mg by mouth once daily.    FLUTICASONE (VERAMYST) 27.5 MCG/ACTUATION NASAL SPRAY    2 sprays by Nasal route once daily.    FOLIC ACID (FOLVITE) 1 MG TABLET    Take 1 tablet (1 mg total) by mouth once daily.    HYDRALAZINE (APRESOLINE) 50 MG TABLET    Take 1 tablet by mouth 2 (two) times daily.    INSULIN ASPART U-100 (NOVOLOG) 100 UNIT/ML (3 ML) INPN PEN    Inject 2 Units into the skin as needed.    IPRATROPIUM (ATROVENT) 21 MCG (0.03 %) NASAL SPRAY    2 sprays by Each Nostril route 3 (three) times daily as needed for Rhinitis.    LEVETIRACETAM (KEPPRA) 500 MG TAB    Take 1 tablet (500 mg total) by mouth 2 (two) times daily.    LINAGLIPTIN (TRADJENTA) 5 MG TAB TABLET    Take 5 mg by mouth once daily.    LISINOPRIL (PRINIVIL,ZESTRIL) 40 MG TABLET    Take 1 tablet by mouth once daily.    METHOTREXATE 2.5 MG TAB    Take 6 tablets (15 mg total) by mouth every 7 days.    MONTELUKAST (SINGULAIR) 10 MG TABLET    Take 10 mg by mouth.    NITROGLYCERIN (NITROSTAT) 0.4 MG SL TABLET    Place 0.4 mg under the tongue.    NYSTATIN (MYCOSTATIN) 100,000 UNIT/ML SUSPENSION    Take 4 mLs " (400,000 Units total) by mouth 4 (four) times daily. for 14 days    PANTOPRAZOLE (PROTONIX) 40 MG TABLET    Take 1 tablet (40 mg total) by mouth once daily. Take 30 min before a meal    PREDNISOLONE (PRELONE) 15 MG/5 ML SYRUP    Take 5 mLs (15 mg total) by mouth 2 (two) times daily. for 14 days    PREDNISONE (DELTASONE) 10 MG TABLET    Take 0.5 tablets (5 mg total) by mouth once daily. 10 mg daily for 2 weeks; 7.5 mg daily for 1 - 2 weeks, then 5 mg daily after.    PREDNISONE (DELTASONE) 5 MG TABLET    Take 1 tablet (5 mg total) by mouth once daily.    RIFABUTIN (MYCOBUTIN) 150 MG CAP    Take 2 capsules (300 mg total) by mouth every Mon, Wed, Fri.    SOD SULF-POT CHLORIDE-MAG SULF (SUTAB) 1.479-0.188- 0.225 GRAM TABLET    Take 12 tablets by mouth once daily. Please follow Endoscopy 's instructions. Please apply coupon code. Please apply coupon code. BIN: [id number], PCN: [id number], GROUP: [id number], MEMBER ID: [id number]    TRESIBA FLEXTOUCH U-100 100 UNIT/ML (3 ML) INSULIN PEN    SMARTSIG:3 Unit(s) SUB-Q Daily   Modified Medications    No medications on file   Discontinued Medications    No medications on file       Patient Active Problem List    Diagnosis Date Noted    Mycobacterium avium complex 02/28/2025    Type 2 diabetes mellitus with stage 3a chronic kidney disease, with long-term current use of insulin 02/12/2025    Chronic obstructive pulmonary disease, unspecified COPD type 02/12/2025    Immunocompromised 10/31/2024    Right kidney stone 10/03/2024    Sarcoidosis of lung 06/13/2024    Thrush, oral 06/13/2024    Coronary artery disease involving native coronary artery of native heart without angina pectoris 03/26/2024    Type 2 diabetes mellitus without complication, without long-term current use of insulin 03/26/2024    HTN (hypertension) 03/26/2024    Age-related osteoporosis without current pathological fracture 03/26/2024    History of tobacco abuse 03/26/2024    Hypertensive heart disease with heart  "failure 03/26/2024     Noted by [facility]  last documented on [id number]      Convulsions, unspecified convulsion type 02/29/2024    Aortic atherosclerosis 02/29/2024    NSTEMI (non-ST elevated myocardial infarction) 01/24/2024    Hypercalcemia due to granulomatous disease 01/11/2024    Cystocele with prolapse 08/22/2022     Added automatically from request for surgery [id number]      Grade I diastolic dysfunction 10/27/2021    Essential tremor 10/27/2021    Long term current use of aspirin 10/27/2021    Hypothyroidism 06/01/2021    Mixed stress and urge urinary incontinence 03/11/2020    Hx of CABG 12/19/2019    Gastroesophageal reflux disease without esophagitis 11/29/2018    Hyperlipidemia 11/29/2018       Review of Systems   Review of Systems   Constitutional:  Negative for chills, fever, malaise/fatigue and weight loss.   HENT:  Negative for congestion and sore throat.    Eyes:  Negative for blurred vision and double vision.   Respiratory:  Positive for cough, sputum production and shortness of breath. Negative for hemoptysis.    Cardiovascular:  Negative for chest pain and palpitations.   Gastrointestinal:  Negative for diarrhea and vomiting.   Musculoskeletal:  Negative for myalgias and neck pain.   Skin:  Negative for itching and rash.   Neurological:  Negative for dizziness and headaches.   Psychiatric/Behavioral:  Negative for substance abuse. The patient is not nervous/anxious.    All other systems reviewed and are negative.          Objective:      BP (!) 162/74 (BP Location: Right arm, Patient Position: Sitting)   Pulse 69   Temp 97.7 °F (36.5 °C) (Oral)   Ht 5' 1" (1.549 m)   Wt 50.1 kg (110 lb 7.2 oz)   LMP  (LMP Unknown)   BMI 20.87 kg/m²   Estimated body mass index is 20.87 kg/m² as calculated from the following:    Height as of this encounter: 5' 1" (1.549 m).    Weight as of this encounter: 50.1 kg (110 lb 7.2 oz).    Physical Exam  Constitutional:       General: She is not in " acute distress.     Appearance: She is well-developed.   HENT:      Head: Normocephalic and atraumatic.   Eyes:      Conjunctiva/sclera: Conjunctivae normal.      Pupils: Pupils are equal, round, and reactive to light.   Cardiovascular:      Rate and Rhythm: Normal rate and regular rhythm.      Heart sounds: Normal heart sounds.   Pulmonary:      Effort: Pulmonary effort is normal. No respiratory distress.      Breath sounds: Normal breath sounds. No wheezing.   Abdominal:      General: Bowel sounds are normal. There is no distension.      Palpations: Abdomen is soft.   Musculoskeletal:         General: Normal range of motion.      Cervical back: Normal range of motion and neck supple.   Skin:     General: Skin is warm and dry.   Neurological:      General: No focal deficit present.      Mental Status: She is alert and oriented to person, place, and time.      Cranial Nerves: No cranial nerve deficit.   Psychiatric:         Mood and Affect: Mood normal.         Behavior: Behavior normal.         Assessment:         No diagnosis found.          Plan:       Diagnoses and all orders for this visit:    Sarcoidosis of lung  on MTX and pred taper (last dose anastasia)  Recommend she reach out to pul dept to reschedule with new provider  - refill pred 5mg x 1 wk while awaiting to be seen by new pulmonologist      Pulmonary MAC    Meets IDSA criteria for diagnosis. Pt with 3 positive afb sputum cultures, symptoms and radiological findings characteristic of MAC.  Mild to moderate noncavitary nodular disease; candidate for 3 times weekly therapy  Recent labs reviewed are wnl    -continue 3 times weekly Azithromycin, Ethambutol, Rifabutin   -Will avoid Rifampin due to DDI with plavix and methotrexate  -Will monitor for drug toxicity with monthly cbc, cmp  -Duration of treatment is 12 mths from culture clearance. Pt to bring in sputum for monthly afb cx.  -counseled on the importance of airway clearance. Continue hypertonic saline  neb bid and aerobika  -will order CT chest (monitor q 6 mth)  - daily self-vision exams while on ethambutol  -had flu, rsv covid in December  - f/u afb cx    Oral ulcer  --f/u with ENT    Cough  Unclear if this is an exacerbation of her sarcoid or worsening MAC. However, given recent fever concern she may have had a viral URI.   Will eval for pneumonia with CXR and obtain RCx.       Donna Jimenez MD  Infectious Disease     Total professional time spent for the encounter: 30 minutes  Time was spent preparing to see the patient, reviewing results of prior testing, obtaining and/or reviewing separately obtained history, performing a medically appropriate examination and interview, counseling and educating the patient/family, ordering medications/tests/procedures, referring and communicating with other health care professionals, documenting clinical information in the electronic health record, and independently interpreting results.     Visit today included increased complexity associated with the care of the episodic problem long term antibiotic use, MAC pulmonary infection, cough addressed and managing the longitudinal care of the patient due to the serious and/or complex managed problem(s) MAC pulmonary infection.

## 2025-04-29 NOTE — TELEPHONE ENCOUNTER
Called pt to discuss cxr results. Will send in script for augmentin x 5 days for possible post-viral bacterial pna. Will f/u on RCx results.

## 2025-04-30 ENCOUNTER — LAB VISIT (OUTPATIENT)
Dept: LAB | Facility: HOSPITAL | Age: 75
End: 2025-04-30
Attending: INTERNAL MEDICINE
Payer: MEDICARE

## 2025-04-30 DIAGNOSIS — D47.2 MGUS (MONOCLONAL GAMMOPATHY OF UNKNOWN SIGNIFICANCE): ICD-10-CM

## 2025-04-30 DIAGNOSIS — R79.9 ABNORMAL FINDING OF BLOOD CHEMISTRY, UNSPECIFIED: ICD-10-CM

## 2025-04-30 DIAGNOSIS — R97.1 ELEVATED CANCER ANTIGEN 125 (CA 125): ICD-10-CM

## 2025-04-30 LAB
ACID FAST MOD KINY STN SPEC: NORMAL
CANCER AG125 SERPL-ACNC: 24 UNIT/ML
FERRITIN SERPL-MCNC: 153 NG/ML (ref 20–300)
IGA SERPL-MCNC: 72 MG/DL (ref 40–350)
IGG SERPL-MCNC: 610 MG/DL (ref 650–1600)
IGM SERPL-MCNC: 92 MG/DL (ref 50–300)
IRON SATN MFR SERPL: 24 % (ref 20–50)
IRON SERPL-MCNC: 84 UG/DL (ref 30–160)
TIBC SERPL-MCNC: 349 UG/DL (ref 250–450)
TRANSFERRIN SERPL-MCNC: 236 MG/DL (ref 200–375)

## 2025-04-30 PROCEDURE — 82784 ASSAY IGA/IGD/IGG/IGM EACH: CPT | Mod: 59,HCNC

## 2025-04-30 PROCEDURE — 86304 IMMUNOASSAY TUMOR CA 125: CPT | Mod: HCNC

## 2025-04-30 PROCEDURE — 86334 IMMUNOFIX E-PHORESIS SERUM: CPT | Mod: HCNC

## 2025-04-30 PROCEDURE — 85810 BLOOD VISCOSITY EXAMINATION: CPT | Mod: HCNC

## 2025-04-30 PROCEDURE — 84466 ASSAY OF TRANSFERRIN: CPT | Mod: HCNC

## 2025-04-30 PROCEDURE — 36415 COLL VENOUS BLD VENIPUNCTURE: CPT | Mod: HCNC

## 2025-04-30 PROCEDURE — 82728 ASSAY OF FERRITIN: CPT | Mod: HCNC

## 2025-04-30 PROCEDURE — 84165 PROTEIN E-PHORESIS SERUM: CPT | Mod: HCNC

## 2025-04-30 PROCEDURE — 83521 IG LIGHT CHAINS FREE EACH: CPT | Mod: HCNC

## 2025-05-01 ENCOUNTER — ANESTHESIA (OUTPATIENT)
Dept: ENDOSCOPY | Facility: HOSPITAL | Age: 75
End: 2025-05-01

## 2025-05-01 ENCOUNTER — PATIENT MESSAGE (OUTPATIENT)
Dept: PULMONOLOGY | Facility: CLINIC | Age: 75
End: 2025-05-01
Payer: MEDICARE

## 2025-05-01 LAB
KAPPA LC FREE SER-MCNC: 1.02 MG/L (ref 0.26–1.65)
KAPPA LC FREE/LAMBDA FREE SER: 1.95 MG/DL (ref 0.33–1.94)
LAMBDA LC FREE SERPL-MCNC: 1.91 MG/DL (ref 0.57–2.63)
PATHOLOGIST INTERPRETATION - IFE SERUM (OHS): NORMAL

## 2025-05-01 NOTE — ANESTHESIA PREPROCEDURE EVALUATION
05/01/2025  Kylah Deal is a 74 y.o., female.      Pre-op Assessment          Review of Systems         Anesthesia Plan  Type of Anesthesia, risks & benefits discussed:    Anesthesia Plan Notes: Pt presents for elective outpatient colonoscopy. She is currently on antibiotics for possible pneumonia. D/w patient postponing procedure until current issues resolved. Pt is disappointed, but understands.  -Flako    .

## 2025-05-02 LAB
ALBUMIN, SPE (OHS): 3.85 G/DL (ref 3.35–5.55)
ALPHA 1 GLOB (OHS): 0.37 GM/DL (ref 0.17–0.41)
ALPHA 2 GLOB (OHS): 1.01 GM/DL (ref 0.43–0.99)
BETA GLOB (OHS): 0.68 GM/DL (ref 0.5–1.1)
GAMMA GLOBULIN (OHS): 0.59 GM/DL (ref 0.67–1.58)
PROT SERPL-MCNC: 6.5 GM/DL (ref 6–8.4)

## 2025-05-03 LAB — VISC SER: 1.13 CP

## 2025-05-06 ENCOUNTER — TELEPHONE (OUTPATIENT)
Dept: PULMONOLOGY | Facility: CLINIC | Age: 75
End: 2025-05-06
Payer: MEDICARE

## 2025-05-06 ENCOUNTER — CLINICAL SUPPORT (OUTPATIENT)
Dept: AUDIOLOGY | Facility: CLINIC | Age: 75
End: 2025-05-06
Payer: MEDICARE

## 2025-05-06 ENCOUNTER — OFFICE VISIT (OUTPATIENT)
Dept: OTOLARYNGOLOGY | Facility: CLINIC | Age: 75
End: 2025-05-06
Payer: MEDICARE

## 2025-05-06 ENCOUNTER — PATIENT MESSAGE (OUTPATIENT)
Dept: INFECTIOUS DISEASES | Facility: CLINIC | Age: 75
End: 2025-05-06
Payer: MEDICARE

## 2025-05-06 VITALS
WEIGHT: 110.44 LBS | DIASTOLIC BLOOD PRESSURE: 76 MMHG | SYSTOLIC BLOOD PRESSURE: 196 MMHG | BODY MASS INDEX: 20.85 KG/M2 | HEIGHT: 61 IN

## 2025-05-06 DIAGNOSIS — J30.2 SEASONAL ALLERGIC RHINITIS, UNSPECIFIED TRIGGER: ICD-10-CM

## 2025-05-06 DIAGNOSIS — J34.3 NASAL TURBINATE HYPERTROPHY: Primary | ICD-10-CM

## 2025-05-06 DIAGNOSIS — D86.9 SARCOID: ICD-10-CM

## 2025-05-06 DIAGNOSIS — H90.6 MIXED HEARING LOSS, BILATERAL: ICD-10-CM

## 2025-05-06 DIAGNOSIS — H90.6 MIXED CONDUCTIVE AND SENSORINEURAL HEARING LOSS OF BOTH EARS: Primary | ICD-10-CM

## 2025-05-06 DIAGNOSIS — H65.91 MIDDLE EAR EFFUSION, RIGHT: ICD-10-CM

## 2025-05-06 DIAGNOSIS — R04.0 EPISTAXIS: ICD-10-CM

## 2025-05-06 DIAGNOSIS — J34.89 NASAL CRUSTING: ICD-10-CM

## 2025-05-06 DIAGNOSIS — K12.1 ORAL ULCER: ICD-10-CM

## 2025-05-06 LAB — PATHOLOGIST REVIEW - SPE (OHS): NORMAL

## 2025-05-06 PROCEDURE — 1159F MED LIST DOCD IN RCRD: CPT | Mod: CPTII,S$GLB,, | Performed by: OTOLARYNGOLOGY

## 2025-05-06 PROCEDURE — 92567 TYMPANOMETRY: CPT | Mod: S$GLB,,,

## 2025-05-06 PROCEDURE — 3078F DIAST BP <80 MM HG: CPT | Mod: CPTII,S$GLB,, | Performed by: OTOLARYNGOLOGY

## 2025-05-06 PROCEDURE — 3077F SYST BP >= 140 MM HG: CPT | Mod: CPTII,S$GLB,, | Performed by: OTOLARYNGOLOGY

## 2025-05-06 PROCEDURE — 92557 COMPREHENSIVE HEARING TEST: CPT | Mod: S$GLB,,,

## 2025-05-06 PROCEDURE — 99215 OFFICE O/P EST HI 40 MIN: CPT | Mod: S$GLB,,, | Performed by: OTOLARYNGOLOGY

## 2025-05-06 PROCEDURE — 3288F FALL RISK ASSESSMENT DOCD: CPT | Mod: CPTII,S$GLB,, | Performed by: OTOLARYNGOLOGY

## 2025-05-06 PROCEDURE — 1126F AMNT PAIN NOTED NONE PRSNT: CPT | Mod: CPTII,S$GLB,, | Performed by: OTOLARYNGOLOGY

## 2025-05-06 PROCEDURE — 1101F PT FALLS ASSESS-DOCD LE1/YR: CPT | Mod: CPTII,S$GLB,, | Performed by: OTOLARYNGOLOGY

## 2025-05-06 RX ORDER — MUPIROCIN 20 MG/G
OINTMENT TOPICAL 2 TIMES DAILY
Qty: 1 EACH | Refills: 0 | Status: SHIPPED | OUTPATIENT
Start: 2025-05-06 | End: 2025-05-06

## 2025-05-06 RX ORDER — TRIAMCINOLONE ACETONIDE 1 MG/G
PASTE DENTAL
Qty: 5 G | Refills: 1 | Status: SHIPPED | OUTPATIENT
Start: 2025-05-06

## 2025-05-06 NOTE — PROGRESS NOTES
OTOLARYNGOLOGY CLINIC NOTE  Date:  05/06/2025     Chief complaint:  Chief Complaint   Patient presents with    Mouth Lesions     BETTER BUT %    Nasal Congestion    Cough       History of Present Illness  Kylah Deal is a 74 y.o. female  presenting today for a followup.    At last visit on 4-25-25 she had developed a tongue ulcer. I gave her prednisolone rinse and nystatin and she is here today for repeat evaluation and possible biopsy if ulcer has not resolved. I advised her to start a saline rinse and also gave her atrovent to use prn for rhinitis.     Bilateral ear fullness and can hear her self talking. Sometimes has some congestion in the nose . Left ear baseline is really bad hearing she states but now with current issue can't hear at all. By Sunday after she saw me on Friday ears were blocked. Has been having difficulty with hearing in both ears. Sometimes she can autoinsufflate and might be a little better for a couple  of minutes     Does saline mist spray prior to azelastine and flonase bid and the atrovent prn for runny nose . Rhinitis has improved. Still with congestion ; still has a little bit of cough . She saw infectious disease md Tuesday and cxr was ordered. She was sent a rx for augmentin.     Cough had been better when had been better when she had been on steroid  She takes zyrtec in am and singulair     I last saw the patient on 4-25-25. Below text is copied from  note on that date describing history of present illness at that time :  Lots of rhinitis  Lower left side of mouth under teeth ulcer has been there for about 2-3 weeks   Carbonated drinks sweet and salty burn the mouth really bad.   Saw dentist and told it looked like it was healing   At times area throbs . Dentist said teeth themselves looked ok      Cough has been worsening, she is not sure if exacerbation of sarcoid or MAC but waking up with cough and phlegm   Plan was to stop the steroid but symptoms come back when stops  it  Gets sob and fatigue even on the 5 mg dose ; had been doing well up until 10 days ago   Nasal sprays havbe been helping   Saline Flonase and astelin bid most time sate last once a day      I last saw the patient on 12-23 - 24. Below text is copied from  note on that date describing history of present illness at that time :Has not had any large bleeds ; on occasion feels nose running and blots nose and blood in the snot.  Has been going on from mid to late September and also had drip; shortly after started on astelin started having nosebleeds. Sometimes goes a few days without bleeding. Sometimes when blows nose in bright red blood. Sometimes gets kleenex and dabs in nose; she can feel the scab in   Sometimes gets clots that come out - has flores a week to 10 days  Does not really dripout the front of  ; nose is wet      Had been on flonase at time used astelin but not recently  Has been on singulair but still gets congestion and sinus infections- usually has problem in the winter     Has cough issues - has not done her nebulizer this am      Had done zyrtec and flonase in the past and that did help     It is typically the right side ; on occasion gets a tinge from the left but clots and scabbing on the right   Had issues with gerd and had been on pantoprazole and past year had to change insurance for pulm issues just ran out of pantoprazole a couple of days ago usually when would go off of it would have take mylanta at night      Has a history of sarcoid and MAC- was diagnosed in January        Past Medical History  Past Medical History:   Diagnosis Date    CAD (coronary artery disease)     Diabetes mellitus     Hypertension     Migraine     Seizures         Past Surgical History  Past Surgical History:   Procedure Laterality Date    APPENDECTOMY      CHOLECYSTECTOMY      CORONARY ANGIOPLASTY WITH STENT PLACEMENT  2017    CORONARY ARTERY BYPASS GRAFT      ENDOSCOPIC ULTRASOUND OF UPPER GASTROINTESTINAL TRACT N/A  04/03/2024    Procedure: ULTRASOUND, UPPER GI TRACT, ENDOSCOPIC;  Surgeon: Lavell Lomax MD;  Location: [facility] ENDO;  Service: Endoscopy;  Laterality: N/A;  4/1 portal- EUS-guided biopsy of anastacia hepatis mass.    HEEL SPUR EXCISION  1997    HIP FRACTURE SURGERY Left 2018    HYSTERECTOMY      TONSILLECTOMY      URETEROSCOPIC REMOVAL OF URETERIC CALCULUS Right 10/3/2024    Procedure: REMOVAL, CALCULUS, URETER, URETEROSCOPIC Laser lithotripsy Retrograde pyelogram Ureteral stent placement;  Surgeon: Annamarie Lake MD;  Location: [facility] OR;  Service: Urology;  Laterality: Right;  holmium laser 365 madrigal, time 2:13, energy 0.76kj    URETHRAL SLING  2022    WRIST SURGERY Right 2005        Medications  Medications Ordered Prior to Encounter[1]    Review of Systems  Review of Systems   Constitutional:  Positive for malaise/fatigue.   HENT:  Positive for hearing loss.    Eyes: Negative.    Respiratory:  Positive for cough.    Cardiovascular: Negative.    Gastrointestinal: Negative.    Genitourinary: Negative.    Musculoskeletal:  Positive for back pain.   Skin: Negative.    Endo/Heme/Allergies:  Bruises/bleeds easily.   Psychiatric/Behavioral: Negative.          Answers submitted by the patient for this visit:  Review of Symptoms Questionnaire  (Submitted on 5/3/2025)  Seasonal Allergies?: Yes  Light-headedness: Yes    Social History   reports that she quit smoking about 17 years ago. Her smoking use included cigarettes. She has never used smokeless tobacco. She reports current alcohol use of about 1.0 standard drink of alcohol per week. She reports that she does not use drugs.     Family History  Family History   Problem Relation Name Age of Onset    Lung cancer Father      Breast cancer Sister      Cancer Maternal Uncle      Cancer Paternal Aunt      Cancer Maternal Grandmother          Physical Exam   Vitals:    05/06/25 1423   BP: (!) 196/76    Body mass index is 20.87 kg/m².            GENERAL: no acute  distress.  HEAD: normocephalic.   EYES: No scleral icterus  EARS: external ear without lesion, normal pinna shape and position. Right middle ear effusion   NOSE: external nose without significant bony abnormality, anterior rhinoscopy with dry mucosa and some crusting and pinpoint areas of blood no active bleeding  ORAL CAVITY/OROPHARYNX: tongue mobile. About 0.2-0.3 ulcer with mild erythema surrounding on left alveolar ridge -lingual surface; silver nitrate applied  Difficult to see in below photo but red [place] is area of concern- easier to see in haiku analia     NECK: trachea midline.   LYMPH NODES:No cervical lymphadenopathy.  RESPIRATORY: no stridor, no stertor. Voice normal. Respirations nonlabored.  NEURO: alert, responds to questions appropriately.    PSYCH:mood appropriate    AUDIOLOGY RESULTS  Audiometric evaluation including audiogram, tympanometry, acoustic reflexes, and speech discrimination which was performed  5-6-25 was personally reviewed and interpreted.  Notable findings on the audiogram were bilateral mixed conductive and  sensorineural hearing loss (SNHL) with worse air bone gap on the  left compared to right. Left ear with moderate sloping to profound loss and right ear with mild mixed loss started at 1 khz and then sloping to severe snhl at high frequencies.  Tympanometry revealed Type As borderline type B  tympanogram on the left and Type B tympanogram on the right. Speech discrimination was 52%  on the left, and 88% on the right.     Report of the audiologist performing this audiometric testing was also reviewed     Imaging:  The patient does not have any new imaging of the head and neck since last visit.     Labs:  CBC  Recent Labs   Lab 03/12/25  0920 04/11/25  0849 04/25/25  1003   WBC 4.91 5.30 5.82   Hemoglobin 10.0 L  --   --    HGB  --  10.5 L 10.8 L   Hematocrit 31.8 L  --   --    HCT  --  33.9 L 34.7 L   MCV 92 94 94   Platelet Count  --  232 228   Platelets 276  --   --       BMP  Recent Labs   Lab 11/06/24  1926 11/07/24  0835 03/12/25  0920 04/11/25  0849 04/25/25  1003   Glucose 109   < > 161 H 140 H 115 H   Sodium 139   < > 141 141 141   Potassium 4.2   < > 4.8 4.9 4.7   Chloride 103   < > 106 107 106   CO2 26   < > 26 27 27   Carbon Dioxide  --    < >  --   --   --    BUN 18   < > 24 H 35 H 21   Blood Urea Nitrogen  --    < >  --   --   --    Creatinine 1.1   < > 1.1 1.1 1.0   Calcium 9.7   < > 9.5 9.4 9.5   Magnesium 1.9  --   --   --   --     < > = values in this interval not displayed.     COAGS  Recent Labs   Lab 02/22/24  1223 03/27/24  1251 05/23/24  0922   INR 1.0 1.0 0.9       Assessment  1. Nasal turbinate hypertrophy    2. Sarcoid    3. Epistaxis    4. Seasonal allergic rhinitis, unspecified trigger    5. Oral ulcer    6. Nasal crusting    7. Middle ear effusion, right    8. Mixed hearing loss, bilateral       Plan:  Discussed plan of care with patient in detail and all questions answered. Patient reported understanding of plan of care. I gave the patient the opportunity to ask questions and patient confirmed all questions answered to satisfaction.     Reviewed records from infectious disease    Offered to biopsy today , she has been feeling under the weather with cough . I offered to try a steroid paste and apply silver nitrate today. I do think the area is smaller and she is felling a bit better - it does look more like aphthous ulcer but discussed risk of precancer and cancer. I offered to try to apply silver nitrate and try doing a steroid paste if she would be amenable to return for a visit to recheck the area in 2 weeks and if it is still there would definitely need biopsy. She will notify me if any worsening pain, bleeding , numbness of face , problems swallowing for immediate appointment    She did complete augmentin for 5 days which would have cover sinonasal pathogen as well. Has some mild crust and pinpoint areas of blood on anterior rhinoscopy- will give  mupirocin ointment course and advised her to do saline mist spray . She has mupirocin at home. Also advised to Scale back on atrovent nasal spray as may be causing too much dryness. Saline rinses she is concerned will flare up ears,discussed on max regimen for sprays, could do budesonide in rinse if desires . She will try increasing saline spray for now    I do not have any prior audiograms, did discuss with patient about potential for sudden nerve hearing loss. Clinically she has serous middle ear effusion on exam today ( not full effusion however) which could explain pattern, left ear questionable fluid, may have history of otosclerosis? Will get follow up hearing test in the near future.     When returns for followup again in 2 weeks I told her could place tympanostomy tube at that time. Avoid oral decongestant with her heart history but will have her do regimen of afrin bid off and on to minimize risk of rhinitis medicamentosa(gave her regimen)    Silver nitrate applied today. If helps can do one additional time in 1 week  Finish out prednisolone rinse and nystatin rinse and add on kenalog paste- gave instructions verbally and written , also gave sample of oral lidocaine with instructions    She has reached out to infectious disease md to help get clarification about steroid regimen as related to her sarcoid history as patient has tried to message dr corona office without response    F/u 2 weeks     I spent a total of 40 minutes on the day of the visit with 75% of that time patient facing  This includes face to face time and non-face to face time preparing to see the patient (eg, review of tests), obtaining and/or reviewing separately obtained history, documenting clinical information in the electronic or other health record, independently interpreting results and communicating results to the patient/family/caregiver, or care coordinator.   Please be aware that this note has been generated with the assistance of  "MModal voice-to-text.  Please excuse any spelling or grammatical errors.             [1]   Current Outpatient Medications on File Prior to Visit   Medication Sig Dispense Refill    albuterol (PROVENTIL) 2.5 mg /3 mL (0.083 %) nebulizer solution Take 3 mLs (2.5 mg total) by nebulization every 6 (six) hours as needed for Wheezing. Rescue 120 mL 5    aspirin (ECOTRIN) 81 MG EC tablet Take 81 mg by mouth once daily.      azithromycin (ZITHROMAX) 500 MG tablet Take 1 tablet (500 mg total) by mouth every Mon, Wed, Fri. 12 tablet 11    blood sugar diagnostic Strp Check blood glucose 4 times a day      budesonide-formoterol 160-4.5 mcg (SYMBICORT) 160-4.5 mcg/actuation HFAA Inhale 2 puffs into the lungs every 12 (twelve) hours. Controller 10.2 g 3    carvediloL (COREG) 12.5 MG tablet Take 1 tablet by mouth 2 (two) times daily with meals.      cetirizine (ZYRTEC) 10 MG tablet Take 1 tablet (10 mg total) by mouth once daily. 30 tablet 11    clopidogreL (PLAVIX) 75 mg tablet Take 1 tablet by mouth once daily.      denosumab (PROLIA) 60 mg/mL Syrg Inject 60 mg into the skin every 6 (six) months.      DROPLET PEN NEEDLE 32 gauge x 5/32" Ndle USE 1 EACH BY MISC ROUTE FOUR TIMES DAILY      ethambutoL (MYAMBUTOL) 400 MG Tab Take 3 tablets (1,200 mg total) by mouth every Mon, Wed, Fri. 36 tablet 11    evolocumab (REPATHA SURECLICK SUBQ) Inject into the skin.      ezetimibe (ZETIA) 10 mg tablet Take 10 mg by mouth once daily.      fluticasone (VERAMYST) 27.5 mcg/actuation nasal spray 2 sprays by Nasal route once daily.      folic acid (FOLVITE) 1 MG tablet Take 1 tablet (1 mg total) by mouth once daily. 360 tablet 0    hydrALAZINE (APRESOLINE) 50 MG tablet Take 1 tablet by mouth 2 (two) times daily.      insulin aspart U-100 (NOVOLOG) 100 unit/mL (3 mL) InPn pen Inject 2 Units into the skin as needed.      ipratropium (ATROVENT) 21 mcg (0.03 %) nasal spray 2 sprays by Each Nostril route 3 (three) times daily as needed for Rhinitis. " 30 mL 1    levETIRAcetam (KEPPRA) 500 MG Tab Take 1 tablet (500 mg total) by mouth 2 (two) times daily. 180 tablet 3    linaGLIPtin (TRADJENTA) 5 mg Tab tablet Take 5 mg by mouth once daily.      lisinopriL (PRINIVIL,ZESTRIL) 40 MG tablet Take 1 tablet by mouth once daily.      methotrexate 2.5 MG Tab Take 6 tablets (15 mg total) by mouth every 7 days. 72 tablet 0    montelukast (SINGULAIR) 10 mg tablet Take 10 mg by mouth.      nitroGLYCERIN (NITROSTAT) 0.4 MG SL tablet Place 0.4 mg under the tongue.      nystatin (MYCOSTATIN) 100,000 unit/mL suspension Take 4 mLs (400,000 Units total) by mouth 4 (four) times daily. for 14 days 224 mL 0    pantoprazole (PROTONIX) 40 MG tablet Take 1 tablet (40 mg total) by mouth once daily. Take 30 min before a meal 90 tablet 3    prednisoLONE (PRELONE) 15 mg/5 mL syrup Take 5 mLs (15 mg total) by mouth 2 (two) times daily. for 14 days 140 mL 0    predniSONE (DELTASONE) 5 MG tablet Take 1 tablet (5 mg total) by mouth once daily. for 7 days 7 tablet 0    rifabutin (MYCOBUTIN) 150 mg Cap Take 2 capsules (300 mg total) by mouth every Mon, Wed, Fri. 24 capsule 11    sod sulf-pot chloride-mag sulf (SUTAB) 1.479-0.188- 0.225 gram tablet Take 12 tablets by mouth once daily. Please follow Endoscopy 's instructions. Please apply coupon code. Please apply coupon code. BIN: [id number], PCN: [id number], GROUP: [id number], MEMBER ID: [id number] 24 tablet 0    TRESIBA FLEXTOUCH U-100 100 unit/mL (3 mL) insulin pen SMARTSIG:3 Unit(s) SUB-Q Daily       No current facility-administered medications on file prior to visit.

## 2025-05-06 NOTE — PROGRESS NOTES
Please click on link to view Audiogram:  Document on 5/6/2025 3:07 PM by Charlotte Fonseca AU.D: Audiogram    Ms. Kylah Deal, a 74 y.o. female, was seen in the clinic today for an audiological evaluation.     Otoscopy clear bilaterally. Tympanometry testing revealed a Type B tympanogram with normal ear canal volume for the right ear and a Type As tympanogram for the left ear.     Audiological testing revealed a mild sloping to severe mixed hearing loss (MHL) for the right ear and a moderate sloping to profound MHL for the left ear. A speech reception threshold was obtained at 40 dBHL for the right ear and at 55 dBHL for the left ear. Speech discrimination was 88% for the right ear and 52% for the left ear.      Recommendations:  1. Otologic evaluation  2. Annual audiological evaluation, sooner if medically necessary or if hearing changes  3. Hearing aid consultation following medical clearance

## 2025-05-06 NOTE — TELEPHONE ENCOUNTER
Spoke with patient stated that she would an appointment with Dr Romero. I informed patient that Dr Romero is no longer at [facility]. Therefore, will need  to establish care with another provider. Appointment scheduled with Dr Salgado Patient verbalized understanding.

## 2025-05-06 NOTE — PATIENT INSTRUCTIONS
Can do afrin nasal spray twice a day for 2 days and then stop and then after 3-4 days off of the afrin can do again twice a day for 2 days and then stop again for 3-4 days and can do one additional time twice daily for 2 days. This is to see if can get your ear to open up and the fluid to drain out     Try not using atrovent/ipratropium  nasal spray as much and see if that helps with your ear symptoms ( I am not sure if the atrovent is drying your nose too much )and does not make the runny nose worse    Continue doing the saline, flonase and astelin twice daily. You can do the afrin with this regimen - do after saline or after the medicine sprays     Use mupirocin on qtip and gently roll around nose twice daily for 2 weeks  . You can do this after your nasal sprays (saline, flonase and astelin)    Can do saline mist spray in addition to before medicine sprays can do 3-4 times per day to help with moisture  Do any medications after the saline ( I.e. do not use saline after you put ointment in the nose)     Can use provided lidocaine jelly on a qtip to roll over sore area in mouth 4-6 times per day    In 1 week, if silver nitrate was helping that I applied today and after a week it feels sore again, you can gently roll silver nitrate stick on sore area

## 2025-05-07 ENCOUNTER — RESULTS FOLLOW-UP (OUTPATIENT)
Dept: HEMATOLOGY/ONCOLOGY | Facility: CLINIC | Age: 75
End: 2025-05-07

## 2025-05-08 ENCOUNTER — OFFICE VISIT (OUTPATIENT)
Dept: PULMONOLOGY | Facility: CLINIC | Age: 75
End: 2025-05-08
Payer: MEDICARE

## 2025-05-08 VITALS
OXYGEN SATURATION: 99 % | BODY MASS INDEX: 21.29 KG/M2 | DIASTOLIC BLOOD PRESSURE: 76 MMHG | SYSTOLIC BLOOD PRESSURE: 136 MMHG | WEIGHT: 112.75 LBS | HEIGHT: 61 IN | HEART RATE: 67 BPM

## 2025-05-08 DIAGNOSIS — D86.0 SARCOIDOSIS OF LUNG: Primary | ICD-10-CM

## 2025-05-08 DIAGNOSIS — A31.0 MYCOBACTERIUM AVIUM COMPLEX: ICD-10-CM

## 2025-05-08 DIAGNOSIS — R09.81 NASAL CONGESTION: ICD-10-CM

## 2025-05-08 PROCEDURE — 3078F DIAST BP <80 MM HG: CPT | Mod: CPTII,HCNC,S$GLB, | Performed by: INTERNAL MEDICINE

## 2025-05-08 PROCEDURE — 3075F SYST BP GE 130 - 139MM HG: CPT | Mod: CPTII,HCNC,S$GLB, | Performed by: INTERNAL MEDICINE

## 2025-05-08 PROCEDURE — 1159F MED LIST DOCD IN RCRD: CPT | Mod: CPTII,HCNC,S$GLB, | Performed by: INTERNAL MEDICINE

## 2025-05-08 PROCEDURE — 1101F PT FALLS ASSESS-DOCD LE1/YR: CPT | Mod: CPTII,HCNC,S$GLB, | Performed by: INTERNAL MEDICINE

## 2025-05-08 PROCEDURE — 1126F AMNT PAIN NOTED NONE PRSNT: CPT | Mod: CPTII,HCNC,S$GLB, | Performed by: INTERNAL MEDICINE

## 2025-05-08 PROCEDURE — 3288F FALL RISK ASSESSMENT DOCD: CPT | Mod: CPTII,HCNC,S$GLB, | Performed by: INTERNAL MEDICINE

## 2025-05-08 PROCEDURE — 99215 OFFICE O/P EST HI 40 MIN: CPT | Mod: HCNC,S$GLB,, | Performed by: INTERNAL MEDICINE

## 2025-05-08 PROCEDURE — 99999 PR PBB SHADOW E&M-EST. PATIENT-LVL IV: CPT | Mod: PBBFAC,HCNC,, | Performed by: INTERNAL MEDICINE

## 2025-05-08 RX ORDER — PREDNISONE 5 MG/1
5 TABLET ORAL DAILY
Qty: 30 TABLET | Refills: 3 | Status: SHIPPED | OUTPATIENT
Start: 2025-05-08

## 2025-05-08 NOTE — PROGRESS NOTES
Kylah Deal  was seen as a new patient to me for the evaluation of  pulmonary sarcoidosis.    CHIEF COMPLAINT:  No chief complaint on file.      HISTORY OF PRESENT ILLNESS: Kylah Deal is a 74 y.o. female  has a past medical history of CAD (coronary artery disease), Diabetes mellitus, Hypertension, Migraine, and Seizures.  Patient was followed by Dr. De La Torre for pulmonary sarcoidosis.      Per chart reviewed, her diagnosis of Sarcoidosis started with abnormally high calcium level on blood work, leading to CT thorax with bilateral perihilar and peribronchial irregular interstitial opacities. She initially underwent bronchoscopy with tbbx at [facility] in January 2024, but negative for granulomatous disease and malignancy. A (mediastinal) lymph node biopsy with performed with IR on March 1, 2024, resulted in non-caseating granuloma. [place] [facility] [place] [facility], with recommendation for further sampling. She then underwent a EUS on 4/3/24 with finding of enlarged lymph nodes in middle paraesophageal mediastinum, biopsied with finding of rare granulomas.   She was started on Prednisone initially at 30 mg at the end of May 2024, which made her feel great, but this led to uncontrolled glycemic index and difficulty with adjusting her novolog. She was seen in a clinic visit on June 13th 2024, at which time she was recommended to change to MTX as steroid sparring therapy - she started at MTX 2.5 mg weekly (first dose on June 17th) and increased to 5 mg on July 1st. She was instructed to wean off steroids from 15 mg every 5 days and completed steroids on July 3rd 2024. Her fatigue recurred during the wean and persisted off steroids and was is instructed to resume steroids on July 18th, 2024 at 10 mg.   Currently her Methotrexate is up to maintenance dose of 15 mg weekly. She is taking folic acid daily.  Patient is currently on prednisone 5 mg daily.  Difficulty coming off prednisone  due to cough and sob.      +nasal congestion x several weeks.  No fever or chills.    her sputum culture from 24 is positive for Mycobacterium species.  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  Followed by Dr. Browne.  She was started on a regimen of Azithromycin, Ethambutol, and Rifabutin by Infectious Diseases (ID) during her initial visit on 24.   Followed by Dr. Sanchez for possible MGUS    Additional Pulmonary History:   Occupational/Environmental Exposures:  [employer] budget [employer] for [employer].    Exposure to Animals/Pets:  denied  Foreign Travel History:  have not travel outside country since ; cruise at that time  History of exposures to TB:  denied   Family History of Lung Cancer:  father  from lung cancer   Tobacco:  denied  Childhood history of Lung Disease:  denied  Chest surgery or trauma:  bronchoscopy, fna of mediastinal lymph node.        PAST MEDICAL HISTORY:    Active Ambulatory Problems     Diagnosis Date Noted    Convulsions, unspecified convulsion type 2024    Aortic atherosclerosis 2024    Coronary artery disease involving native coronary artery of native heart without angina pectoris 2024    Type 2 diabetes mellitus without complication, without long-term current use of insulin 2024    HTN (hypertension) 2024    Age-related osteoporosis without current pathological fracture 2024    History of tobacco abuse 2024    Hx of CABG 2019    Hypercalcemia due to granulomatous disease 2024    Grade I diastolic dysfunction 10/27/2021    Gastroesophageal reflux disease without esophagitis 2018    Essential tremor 10/27/2021    Hyperlipidemia 2018    Hypothyroidism 2021    Long term current use of aspirin 10/27/2021    Hypertensive heart disease with heart failure 2024    Sarcoidosis of lung 2024    Thrush, oral 2024    Right kidney stone 10/03/2024    Cystocele with  prolapse 08/22/2022    Mixed stress and urge urinary incontinence 03/11/2020    NSTEMI (non-ST elevated myocardial infarction) 01/24/2024    Immunocompromised 10/31/2024    Type 2 diabetes mellitus with stage 3a chronic kidney disease, with long-term current use of insulin 02/12/2025    Chronic obstructive pulmonary disease, unspecified COPD type 02/12/2025    Mycobacterium avium complex 02/28/2025    Nasal congestion 05/08/2025     Resolved Ambulatory Problems     Diagnosis Date Noted    Abnormal CT of the chest 03/18/2024    Dry mouth and eyes 04/24/2024    Acute renal failure 06/13/2024     Past Medical History:   Diagnosis Date    CAD (coronary artery disease)     Diabetes mellitus     Hypertension     Migraine     Seizures                 PAST SURGICAL HISTORY:    Past Surgical History:   Procedure Laterality Date    APPENDECTOMY      CHOLECYSTECTOMY      CORONARY ANGIOPLASTY WITH STENT PLACEMENT  2017    CORONARY ARTERY BYPASS GRAFT      ENDOSCOPIC ULTRASOUND OF UPPER GASTROINTESTINAL TRACT N/A 04/03/2024    Procedure: ULTRASOUND, UPPER GI TRACT, ENDOSCOPIC;  Surgeon: Lavell Lomax MD;  Location: [facility];  Service: Endoscopy;  Laterality: N/A;  4/1 portal- EUS-guided biopsy of anastacia hepatis mass.    HEEL SPUR EXCISION  1997    HIP FRACTURE SURGERY Left 2018    HYSTERECTOMY      TONSILLECTOMY      URETEROSCOPIC REMOVAL OF URETERIC CALCULUS Right 10/3/2024    Procedure: REMOVAL, CALCULUS, URETER, URETEROSCOPIC Laser lithotripsy Retrograde pyelogram Ureteral stent placement;  Surgeon: Annamarie Lake MD;  Location: [facility] OR;  Service: Urology;  Laterality: Right;  holmium laser 365 madrigal, time 2:13, energy 0.76kj    URETHRAL SLING  2022    WRIST SURGERY Right 2005         FAMILY HISTORY:                Family History   Problem Relation Name Age of Onset    Lung cancer Father      Breast cancer Sister      Cancer Maternal Uncle      Cancer Paternal Aunt      Cancer Maternal Grandmother         SOCIAL  "HISTORY:          Tobacco: Tobacco Use History[1]  alcohol use:    Social History     Substance and Sexual Activity   Alcohol Use Yes    Alcohol/week: 1.0 standard drink of alcohol    Types: 1 Glasses of wine per week                   ALLERGIES:    Review of patient's allergies indicates:   Allergen Reactions    Atorvastatin calcium Other (See Comments)     Pain    Metformin Diarrhea     Diarrhea and cramping    Rosuvastatin Other (See Comments)     pain    Statins-hmg-coa reductase inhibitors        CURRENT MEDICATIONS:  Current Medications[2]               REVIEW OF SYSTEMS:     Pulmonary related symptoms as per HPI.  Gen:  no weight loss, no fever, no night sweat  HEENT:  no visual changes, no sore throat, no hearing loss  CV:  No chest pain, no orthopnea, no PND  GI:  no melena, no hematochezia, no diarhea, no constipation.  :  no dysuria, no hematuria, no hesistancy, no dribbling  Neuro:  no syncope, no vertigo, no tinitus  Psych:  No homocide or suicide ideation; no depression.  Endocrine:  No heat or cold intolerance.  Sleep:  No snoring; no witnessed apnea.  Otherwise, a balance of systems reviewed is negative.          PHYSICAL EXAM:  Vitals:    05/08/25 0907   BP: 136/76   Pulse: 67   SpO2: 99%   Weight: 51.1 kg (112 lb 12.2 oz)   Height: 5' 1" (1.549 m)   PainSc: 0-No pain     Body mass index is 21.31 kg/m².     GENERAL:  well develop; no apparent distress  HEENT:  no nasal congestion; no discharge noted; class 3 modified mallampatti.   NECK:  supple; no palpable masses.  CARDIO: regular rate and rhythm  PULM:  clear to auscultation bilaterally; no intercostals retractions; no accessory muscle usage   ABDOMEN:  soft nontender/nondistended.  +bowel sound  EXTREMITIES no cce  NEURO:  CN II-XII intact.  5/5 motor in all extremities.  sensation grossly intact   to light touch.  PSYCH:  normal affect.  Alert and oriented x 4    LABS  Pulmonary Functions Testing Results(personally reviewed):    PFT 6/13/24 " Ratio of 70%; FVC 2.25 L (91%); FEV1 1.24 L (81%); TLC 3.34 L (73%); dlco 7.4 (38%)   PFT 2/28/25 Ratio of 63%; FVC 2.28 L (93%); FEV1 1.02 L (72%); TLC n/a L (n/a%); dlco 7.8 (41%)   VBG (personally reviewed):  11/6/24 7.47/38/14/28  CXR (personally reviewed):  4/29/25 perihilar fullness with increased reticular markings.  Similar to 11/6/24  CT CHEST(personally reviewed):  11/6/24 no central pe; +scattered calcification mediastinal region.  +extensive perilymphatic nodular infiltrate predominantly in upper lungs and perihilar location.      Outside facility:  CT Guided Lung Bx  Date: 3/1/24  MEDIASTINAL BIOPSY:   - FOCAL NONCASEATING GRANULOMATOUS INFLAMMATION.         Bronchoscopy  Date: 1/31/24  1. LUNG, RIGHT LOWER LOBE, BRONCHIAL BIOPSY:   - BENIGN LOWER RESPIRATORY TRACT COLUMNAR MUCOSA WITH MINIMAL FOCI OF   SQUAMOUS METAPLASTIC ELEMENTS.   - NEGATIVE FOR CARCINOMA AND GRANULOMATOUS DISEASE.     2. LUNG, LEFT LOWER LOBE, BIOPSY:   - BENIGN BRONCHIAL WALL AND PULMONARY ALVEOLAR PARENCHYMAL TISSUES.   - NO EVIDENCE OF CARCINOMA OR GRANULOMATOUS DISEASE.   - NEGATIVE FOR ACTIVE PNEUMONITIS.     3. LUNG, RIGHT MAINSTEM BRONCHUS, BIOPSY:   - MINIMAL SMALL FRAGMENTS OF BRONCHIAL MUCOSA WITH SQUAMOUS METAPLASIA.   - NO EVIDENCE OF CARCINOMA OR GRANULOMATOUS DISEASE.     1. BRONCHIAL WASHING AND BAL:   - NEGATIVE FOR MALIGNANCY.     2. BRONCHIAL BRUSHINGS AND BAL:   - NEGATIVE FOR MALIGNANCY.     3. BRONCHIAL BRUSHINGS:   - NEGATIVE FOR MALIGNANCY; SQUAMOUS METAPLASIA AND REACTIVE ATYPIA ARE     ASSESSMENT/PLAN  Problem List Items Addressed This Visit       Mycobacterium avium complex    Overview   followed by Dr. Browne.  Currently Azithromycin, Ethambutol, and Rifabutin.  Last sputum afb 3/12, 4/29 ngtd.           Nasal congestion    Overview   followed by Dr. Edward.  Sinus irrigation + sinus irrigation and nasal steroid.           Sarcoidosis of lung - Primary    Overview   3/1/24 with focal noncaseating  granuloma  Symptoms improved dramatically with the addition of prednisone. Because her diabetes has been difficult to manage on the prednisone, patient was transition her to methotrexate.   Currently on methotrexate of 15 mg and folic acid  Currently on symbicort.           Relevant Medications    predniSONE (DELTASONE) 5 MG tablet    Other Relevant Orders    Complete PFT with bronchodilator         Patient will No follow-ups on file.     42 minutes of total time spent on the encounter, which includes face to face time and non-face to face time preparing to see the patient (eg, review of tests), Obtaining and/or reviewing separately obtained history, documenting clinical information in the electronic or other health record, independently interpreting results (not separately reported) and communicating results to the patient/family/caregiver, or Care coordination (not separately reported).      Visit today included increased complexity associated with the care of the episodic problem sarcoidosis addressed and managing the longitudinal care of the patient due to the serious and/or complex managed problem(s) sarcoidosis.        [1]   Social History  Tobacco Use   Smoking Status Former    Current packs/day: 0.00    Types: Cigarettes    Quit date:     Years since quittin.3   Smokeless Tobacco Never   [2]   Current Outpatient Medications   Medication Sig Dispense Refill    albuterol (PROVENTIL) 2.5 mg /3 mL (0.083 %) nebulizer solution Take 3 mLs (2.5 mg total) by nebulization every 6 (six) hours as needed for Wheezing. Rescue 120 mL 5    aspirin (ECOTRIN) 81 MG EC tablet Take 81 mg by mouth once daily.      azithromycin (ZITHROMAX) 500 MG tablet Take 1 tablet (500 mg total) by mouth every Mon, Wed, Fri. 12 tablet 11    blood sugar diagnostic Strp Check blood glucose 4 times a day      budesonide-formoterol 160-4.5 mcg (SYMBICORT) 160-4.5 mcg/actuation HFAA Inhale 2 puffs into the lungs every 12 (twelve) hours.  "Controller 10.2 g 3    carvediloL (COREG) 12.5 MG tablet Take 1 tablet by mouth 2 (two) times daily with meals.      cetirizine (ZYRTEC) 10 MG tablet Take 1 tablet (10 mg total) by mouth once daily. 30 tablet 11    clopidogreL (PLAVIX) 75 mg tablet Take 1 tablet by mouth once daily.      denosumab (PROLIA) 60 mg/mL Syrg Inject 60 mg into the skin every 6 (six) months.      DROPLET PEN NEEDLE 32 gauge x 5/32" Ndle USE 1 EACH BY MISC ROUTE FOUR TIMES DAILY      ethambutoL (MYAMBUTOL) 400 MG Tab Take 3 tablets (1,200 mg total) by mouth every Mon, Wed, Fri. 36 tablet 11    evolocumab (REPATHA SURECLICK SUBQ) Inject into the skin.      ezetimibe (ZETIA) 10 mg tablet Take 10 mg by mouth once daily.      fluticasone (VERAMYST) 27.5 mcg/actuation nasal spray 2 sprays by Nasal route once daily.      folic acid (FOLVITE) 1 MG tablet Take 1 tablet (1 mg total) by mouth once daily. 360 tablet 0    hydrALAZINE (APRESOLINE) 50 MG tablet Take 1 tablet by mouth 2 (two) times daily.      insulin aspart U-100 (NOVOLOG) 100 unit/mL (3 mL) InPn pen Inject 2 Units into the skin as needed.      ipratropium (ATROVENT) 21 mcg (0.03 %) nasal spray 2 sprays by Each Nostril route 3 (three) times daily as needed for Rhinitis. 30 mL 1    levETIRAcetam (KEPPRA) 500 MG Tab Take 1 tablet (500 mg total) by mouth 2 (two) times daily. 180 tablet 3    linaGLIPtin (TRADJENTA) 5 mg Tab tablet Take 5 mg by mouth once daily.      lisinopriL (PRINIVIL,ZESTRIL) 40 MG tablet Take 1 tablet by mouth once daily.      methotrexate 2.5 MG Tab Take 6 tablets (15 mg total) by mouth every 7 days. 72 tablet 0    montelukast (SINGULAIR) 10 mg tablet Take 10 mg by mouth.      nitroGLYCERIN (NITROSTAT) 0.4 MG SL tablet Place 0.4 mg under the tongue.      nystatin (MYCOSTATIN) 100,000 unit/mL suspension Take 4 mLs (400,000 Units total) by mouth 4 (four) times daily. for 14 days 224 mL 0    pantoprazole (PROTONIX) 40 MG tablet Take 1 tablet (40 mg total) by mouth once " daily. Take 30 min before a meal 90 tablet 3    prednisoLONE (PRELONE) 15 mg/5 mL syrup Take 5 mLs (15 mg total) by mouth 2 (two) times daily. for 14 days 140 mL 0    predniSONE (DELTASONE) 5 MG tablet Take 1 tablet (5 mg total) by mouth once daily. 30 tablet 3    rifabutin (MYCOBUTIN) 150 mg Cap Take 2 capsules (300 mg total) by mouth every Mon, Wed, Fri. 24 capsule 11    sod sulf-pot chloride-mag sulf (SUTAB) 1.479-0.188- 0.225 gram tablet Take 12 tablets by mouth once daily. Please follow Endoscopy 's instructions. Please apply coupon code. Please apply coupon code. BIN: [id number], PCN: [id number], GROUP: [id number], MEMBER ID: [id number] 24 tablet 0    TRESIBA FLEXTOUCH U-100 100 unit/mL (3 mL) insulin pen SMARTSIG:3 Unit(s) SUB-Q Daily      triamcinolone acetonide 0.1% (KENALOG) 0.1 % paste Place small amount on oral ulcer twice daily. If medicine does not stick well to area, place ointment on a qtip and hold against ulcer for 5 minutes twice a day 5 g 1     No current facility-administered medications for this visit.

## 2025-05-12 ENCOUNTER — HOSPITAL ENCOUNTER (OUTPATIENT)
Dept: RADIOLOGY | Facility: HOSPITAL | Age: 75
Discharge: HOME | End: 2025-05-12
Attending: INTERNAL MEDICINE
Payer: MEDICARE

## 2025-05-12 ENCOUNTER — RESULTS FOLLOW-UP (OUTPATIENT)
Dept: INFECTIOUS DISEASES | Facility: HOSPITAL | Age: 75
End: 2025-05-12

## 2025-05-12 DIAGNOSIS — A31.0 MYCOBACTERIUM AVIUM COMPLEX: ICD-10-CM

## 2025-05-12 PROCEDURE — 71250 CT THORAX DX C-: CPT | Mod: 26,HCNC,, | Performed by: RADIOLOGY

## 2025-05-12 PROCEDURE — 71250 CT THORAX DX C-: CPT | Mod: TC,HCNC

## 2025-05-15 ENCOUNTER — PATIENT MESSAGE (OUTPATIENT)
Dept: PULMONOLOGY | Facility: CLINIC | Age: 75
End: 2025-05-15
Payer: MEDICARE

## 2025-05-16 RX ORDER — FOLIC ACID 1 MG/1
1 TABLET ORAL DAILY
Qty: 360 TABLET | Refills: 1 | Status: SHIPPED | OUTPATIENT
Start: 2025-05-16 | End: 2026-05-16

## 2025-05-21 ENCOUNTER — OFFICE VISIT (OUTPATIENT)
Dept: OTOLARYNGOLOGY | Facility: CLINIC | Age: 75
End: 2025-05-21
Payer: MEDICARE

## 2025-05-21 VITALS
SYSTOLIC BLOOD PRESSURE: 145 MMHG | HEART RATE: 72 BPM | BODY MASS INDEX: 21.85 KG/M2 | WEIGHT: 115.63 LBS | DIASTOLIC BLOOD PRESSURE: 71 MMHG

## 2025-05-21 DIAGNOSIS — R04.0 EPISTAXIS: ICD-10-CM

## 2025-05-21 DIAGNOSIS — K13.70 ORAL LESION: Primary | ICD-10-CM

## 2025-05-21 DIAGNOSIS — J30.2 SEASONAL ALLERGIC RHINITIS, UNSPECIFIED TRIGGER: ICD-10-CM

## 2025-05-21 DIAGNOSIS — H65.91 MIDDLE EAR EFFUSION, RIGHT: ICD-10-CM

## 2025-05-21 PROCEDURE — 88305 TISSUE EXAM BY PATHOLOGIST: CPT | Mod: TC | Performed by: OTOLARYNGOLOGY

## 2025-05-21 NOTE — PROGRESS NOTES
OTOLARYNGOLOGY CLINIC NOTE  Date:  05/21/2025     Chief complaint:  Chief Complaint   Patient presents with    Follow-up     2wk f/u- mouth ulcer, poss bx       History of Present Illness  Kylah Deal is a 74 y.o. female  presenting today for a followup.  Some days its not bad , if she touches it; some days it does still hurt but seems like overall making progress in the right direction   Has been using the paste  Has been having a little bit of bleeding from the nose--left side when puts qtip in nose or wiping nose or blows but not large bleed. Congestion is better and ears feel better . Has been doing afrin a couple days then stopping and then again ; still has some congestion but making progress overall and ears feeling better and hearing getting tob aseline. Still has symptoms but improved from prior       I last saw the patient on 5-6-25. Below text is copied from  note on that date describing history of present illness at that time :  At last visit on 4-25-25 she had developed a tongue ulcer. I gave her prednisolone rinse and nystatin and she is here today for repeat evaluation and possible biopsy if ulcer has not resolved. I advised her to start a saline rinse and also gave her atrovent to use prn for rhinitis.      Bilateral ear fullness and can hear her self talking. Sometimes has some congestion in the nose . Left ear baseline is really bad hearing she states but now with current issue can't hear at all. By Sunday after she saw me on Friday ears were blocked. Has been having difficulty with hearing in both ears. Sometimes she can autoinsufflate and might be a little better for a couple  of minutes      Does saline mist spray prior to azelastine and flonase bid and the atrovent prn for runny nose . Rhinitis has improved. Still with congestion ; still has a little bit of cough . She saw infectious disease md Tuesday and cxr was ordered. She was sent a rx for augmentin.      Cough had been better when had  been better when she had been on steroid  She takes zyrtec in am and singulair      I last saw the patient on 4-25-25. Below text is copied from  note on that date describing history of present illness at that time :  Lots of rhinitis  Lower left side of mouth under teeth ulcer has been there for about 2-3 weeks   Carbonated drinks sweet and salty burn the mouth really bad.   Saw dentist and told it looked like it was healing   At times area throbs . Dentist said teeth themselves looked ok      Cough has been worsening, she is not sure if exacerbation of sarcoid or MAC but waking up with cough and phlegm   Plan was to stop the steroid but symptoms come back when stops it  Gets sob and fatigue even on the 5 mg dose ; had been doing well up until 10 days ago   Nasal sprays havbe been helping   Saline Flonase and astelin bid most time sate last once a day      I last saw the patient on 12-23 - 24. Below text is copied from  note on that date describing history of present illness at that time :Has not had any large bleeds ; on occasion feels nose running and blots nose and blood in the snot.  Has been going on from mid to late September and also had drip; shortly after started on astelin started having nosebleeds. Sometimes goes a few days without bleeding. Sometimes when blows nose in bright red blood. Sometimes gets kleenex and dabs in nose; she can feel the scab in   Sometimes gets clots that come out - has flores a week to 10 days  Does not really dripout the front of  ; nose is wet      Had been on flonase at time used astelin but not recently  Has been on singulair but still gets congestion and sinus infections- usually has problem in the winter     Has cough issues - has not done her nebulizer this am      Had done zyrtec and flonase in the past and that did help     It is typically the right side ; on occasion gets a tinge from the left but clots and scabbing on the right   Had issues with gerd and had been on  pantoprazole and past year had to change insurance for pulm issues just ran out of pantoprazole a couple of days ago usually when would go off of it would have take mylanta at night      Has a history of sarcoid and MAC- was diagnosed in January     Past Medical History  Past Medical History:   Diagnosis Date    CAD (coronary artery disease)     Diabetes mellitus     Hypertension     Migraine     Seizures         Past Surgical History  Past Surgical History:   Procedure Laterality Date    APPENDECTOMY      CHOLECYSTECTOMY      CORONARY ANGIOPLASTY WITH STENT PLACEMENT  2017    CORONARY ARTERY BYPASS GRAFT      ENDOSCOPIC ULTRASOUND OF UPPER GASTROINTESTINAL TRACT N/A 04/03/2024    Procedure: ULTRASOUND, UPPER GI TRACT, ENDOSCOPIC;  Surgeon: Lavell Lomax MD;  Location: [facility] ENDO;  Service: Endoscopy;  Laterality: N/A;  4/1 portal- EUS-guided biopsy of anastacia hepatis mass.    HEEL SPUR EXCISION  1997    HIP FRACTURE SURGERY Left 2018    HYSTERECTOMY      TONSILLECTOMY      URETEROSCOPIC REMOVAL OF URETERIC CALCULUS Right 10/3/2024    Procedure: REMOVAL, CALCULUS, URETER, URETEROSCOPIC Laser lithotripsy Retrograde pyelogram Ureteral stent placement;  Surgeon: Annamarie Lake MD;  Location: [facility] OR;  Service: Urology;  Laterality: Right;  holmium laser 365 madrigal, time 2:13, energy 0.76kj    URETHRAL SLING  2022    WRIST SURGERY Right 2005        Medications  Medications Ordered Prior to Encounter[1]    Review of Systems  Review of Systems   Constitutional: Negative.    HENT:  Positive for nosebleeds.    Eyes: Negative.    Respiratory:  Positive for cough and shortness of breath.    Cardiovascular: Negative.    Gastrointestinal:  Positive for constipation.   Musculoskeletal:  Positive for back pain.   Skin: Negative.    Neurological: Negative.    Psychiatric/Behavioral: Negative.          Answers submitted by the patient for this visit:  Review of Symptoms Questionnaire  (Submitted on 5/21/2025)  mouth sores:  Yes  Difficulty urinating?: Yes  Seasonal Allergies?: Yes  Cold all of the time? : Yes  None of these: Yes    Social History   reports that she quit smoking about 17 years ago. Her smoking use included cigarettes. She has never used smokeless tobacco. She reports current alcohol use of about 1.0 standard drink of alcohol per week. She reports that she does not use drugs.     Family History  Family History   Problem Relation Name Age of Onset    Lung cancer Father      Breast cancer Sister      Cancer Maternal Uncle      Cancer Paternal Aunt      Cancer Maternal Grandmother          Physical Exam   Vitals:    05/21/25 1435   BP: (!) 145/71   Pulse: 72    Body mass index is 21.85 kg/m².  Weight: 52.4 kg (115 lb 10.1 oz)         GENERAL: no acute distress.  HEAD: normocephalic.   EYES: No scleral icterus  EARS: external ear without lesion, normal pinna shape and position.  External auditory canal with normal cerumen, tympanic membrane fully visible, no perforation , no retraction. No middle ear effusion. Ossicles intact. Right retracted. Possible slight flid still left looks better   NOSE: external nose without significant bony abnormality; pinpoint blood left caudal septum ; mild turbinate hypertrophy   ORAL CAVITY/OROPHARYNX: tongue mobile.     NECK: trachea midline.   LYMPH NODES:No cervical lymphadenopathy.  RESPIRATORY: no stridor, no stertor. Voice normal. Respirations nonlabored.  NEURO: alert, responds to questions appropriately.    PSYCH:mood appropriate    PROCEDURE NOTE  Procedure performed: biopsy of nonhealing oral ulcer on left mid alveolar ridge lingual side   Indication for procedure: non healing ulcer for over 4 weeks not responding to medical therapy  Pre-procedure diagnosis:pre cancer vs cancer  Post procedure diagnosis: same    Consent: patient counseled on risks/benefits/indications and alternative to biopsy including but not limited to need for additional treatment, biopsy returning as not  diagnostic and need for repeat evaluation, bleeding, infection, pain. The patient elected to undergo procedure.     Procedure in detail : After consent was obtained and with patient in seated position, the lesion and surrounding tissue was injected with 1% lidocaine with 1:100,000 epinephrine. After adequate time for vasoconstriction and anesthesia, A 15 blade and forceps were used to excise the lesion with care for sampling of mucosa and submucosa. The lesion measured 0.5 cm by 0.25 cm . Specimen was placed in formalin to be sent for pathologic examination. Hemostasis was achieved with pressure and silver nitrate . Sutures were not required. The patient tolerated the procedure well without complication.     Imaging:  The patient does not have any new imaging of the head and neck since last visit.     Labs:  CBC  Recent Labs   Lab 04/11/25  0849 04/25/25  1003 05/12/25  1031   WBC 5.30 5.82 4.66   HGB 10.5 L 10.8 L 11.3 L   HCT 33.9 L 34.7 L 36.0 L   MCV 94 94 94   Platelet Count 232 228 217     BMP  Recent Labs   Lab 11/06/24  1926 11/07/24  0835 04/11/25  0849 04/25/25  1003 05/12/25  1031   Glucose 109   < > 140 H 115 H 126 H   Sodium 139   < > 141 141 144   Potassium 4.2   < > 4.9 4.7 5.6 H   Chloride 103   < > 107 106 107   CO2 26   < > 27 27 25   Carbon Dioxide  --    < >  --   --   --    BUN 18   < > 35 H 21 23   Blood Urea Nitrogen  --    < >  --   --   --    Creatinine 1.1   < > 1.1 1.0 1.0   Calcium 9.7   < > 9.4 9.5 10.0   Magnesium 1.9  --   --   --   --     < > = values in this interval not displayed.     COAGS  Recent Labs   Lab 02/22/24  1223 03/27/24  1251 05/23/24  0922   INR 1.0 1.0 0.9       Assessment  1. Oral lesion  - Specimen to Pathology ENT    2. Seasonal allergic rhinitis, unspecified trigger    3. Epistaxis    4. Middle ear effusion, right       Plan:  Discussed plan of care with patient in detail and all questions answered. Patient reported understanding of plan of care. I gave the  patient the opportunity to ask questions and patient confirmed all questions answered to satisfaction.       Discussed that if aphthous ulcer can come back, she is on methotrexate but that can also predispose to scca and since not healing ( tried steroid paste, steroid rinse and nystatin over 4 weeks with no change in appearance) I advised biopsy to ensure no scca nor scca in situ. Can use tylenol for pain, notify me if not controlling pain and can send narcotic    Wound care instructions given- advised to avoid spicy/citrus foods and alcohol based mouthwashes.     Etd and nasal congestion improving, minimal blood no significant bleeding- continue current regimen. Will recheck tymps at follow up if still look like may have fluid behind right ear. Had type b tymp with audio on 5-6. I think overall ears are looking better but right still may have some fluid.     F/u 2 weeks for wound check and path review             [1]   Current Outpatient Medications on File Prior to Visit   Medication Sig Dispense Refill    albuterol (PROVENTIL) 2.5 mg /3 mL (0.083 %) nebulizer solution Take 3 mLs (2.5 mg total) by nebulization every 6 (six) hours as needed for Wheezing. Rescue 120 mL 5    aspirin (ECOTRIN) 81 MG EC tablet Take 81 mg by mouth once daily.      azithromycin (ZITHROMAX) 500 MG tablet Take 1 tablet (500 mg total) by mouth every Mon, Wed, Fri. 12 tablet 11    blood sugar diagnostic Strp Check blood glucose 4 times a day      budesonide-formoterol 160-4.5 mcg (SYMBICORT) 160-4.5 mcg/actuation HFAA Inhale 2 puffs into the lungs every 12 (twelve) hours. Controller 10.2 g 3    carvediloL (COREG) 12.5 MG tablet Take 1 tablet by mouth 2 (two) times daily with meals.      cetirizine (ZYRTEC) 10 MG tablet Take 1 tablet (10 mg total) by mouth once daily. 30 tablet 11    clopidogreL (PLAVIX) 75 mg tablet Take 1 tablet by mouth once daily.      denosumab (PROLIA) 60 mg/mL Syrg Inject 60 mg into the skin every 6 (six) months.       "DROPLET PEN NEEDLE 32 gauge x 5/32" Ndle USE 1 EACH BY MISC ROUTE FOUR TIMES DAILY      ethambutoL (MYAMBUTOL) 400 MG Tab Take 3 tablets (1,200 mg total) by mouth every Mon, Wed, Fri. 36 tablet 11    evolocumab (REPATHA SURECLICK SUBQ) Inject into the skin.      ezetimibe (ZETIA) 10 mg tablet Take 10 mg by mouth once daily.      fluticasone (VERAMYST) 27.5 mcg/actuation nasal spray 2 sprays by Nasal route once daily.      folic acid (FOLVITE) 1 MG tablet Take 1 tablet (1 mg total) by mouth once daily. 360 tablet 1    hydrALAZINE (APRESOLINE) 50 MG tablet Take 1 tablet by mouth 2 (two) times daily.      insulin aspart U-100 (NOVOLOG) 100 unit/mL (3 mL) InPn pen Inject 2 Units into the skin as needed.      ipratropium (ATROVENT) 21 mcg (0.03 %) nasal spray 2 sprays by Each Nostril route 3 (three) times daily as needed for Rhinitis. 30 mL 1    levETIRAcetam (KEPPRA) 500 MG Tab Take 1 tablet (500 mg total) by mouth 2 (two) times daily. 180 tablet 3    linaGLIPtin (TRADJENTA) 5 mg Tab tablet Take 5 mg by mouth once daily.      lisinopriL (PRINIVIL,ZESTRIL) 40 MG tablet Take 1 tablet by mouth once daily.      methotrexate 2.5 MG Tab Take 6 tablets (15 mg total) by mouth every 7 days. 72 tablet 0    montelukast (SINGULAIR) 10 mg tablet Take 10 mg by mouth.      nitroGLYCERIN (NITROSTAT) 0.4 MG SL tablet Place 0.4 mg under the tongue.      pantoprazole (PROTONIX) 40 MG tablet Take 1 tablet (40 mg total) by mouth once daily. Take 30 min before a meal 90 tablet 3    predniSONE (DELTASONE) 5 MG tablet Take 1 tablet (5 mg total) by mouth once daily. 30 tablet 3    rifabutin (MYCOBUTIN) 150 mg Cap Take 2 capsules (300 mg total) by mouth every Mon, Wed, Fri. 24 capsule 11    sod sulf-pot chloride-mag sulf (SUTAB) 1.479-0.188- 0.225 gram tablet Take 12 tablets by mouth once daily. Please follow Endoscopy 's instructions. Please apply coupon code. Please apply coupon code. BIN: [id number], PCN: [id number], GROUP: [id number], MEMBER " ID: [id number] 24 tablet 0    TRESIBA FLEXTOUCH U-100 100 unit/mL (3 mL) insulin pen SMARTSIG:3 Unit(s) SUB-Q Daily      triamcinolone acetonide 0.1% (KENALOG) 0.1 % paste Place small amount on oral ulcer twice daily. If medicine does not stick well to area, place ointment on a qtip and hold against ulcer for 5 minutes twice a day 5 g 1     No current facility-administered medications on file prior to visit.

## 2025-05-23 LAB
ESTROGEN SERPL-MCNC: NORMAL PG/ML
INSULIN SERPL-ACNC: NORMAL U[IU]/ML
LAB AP CLINICAL INFORMATION: NORMAL
LAB AP GROSS DESCRIPTION: NORMAL
LAB AP PERFORMING LOCATION(S): NORMAL
LAB AP REPORT FOOTNOTES: NORMAL

## 2025-05-30 ENCOUNTER — OFFICE VISIT (OUTPATIENT)
Dept: HEMATOLOGY/ONCOLOGY | Facility: CLINIC | Age: 75
End: 2025-05-30
Payer: MEDICARE

## 2025-05-30 VITALS
DIASTOLIC BLOOD PRESSURE: 72 MMHG | HEIGHT: 61 IN | WEIGHT: 113.56 LBS | TEMPERATURE: 98 F | SYSTOLIC BLOOD PRESSURE: 165 MMHG | OXYGEN SATURATION: 99 % | RESPIRATION RATE: 17 BRPM | HEART RATE: 66 BPM | BODY MASS INDEX: 21.44 KG/M2

## 2025-05-30 DIAGNOSIS — R79.9 ABNORMAL FINDING OF BLOOD CHEMISTRY, UNSPECIFIED: ICD-10-CM

## 2025-05-30 DIAGNOSIS — D47.2 MGUS (MONOCLONAL GAMMOPATHY OF UNKNOWN SIGNIFICANCE): Primary | ICD-10-CM

## 2025-05-30 DIAGNOSIS — E83.52 HYPERCALCEMIA DUE TO GRANULOMATOUS DISEASE: ICD-10-CM

## 2025-05-30 DIAGNOSIS — E43 UNSPECIFIED SEVERE PROTEIN-CALORIE MALNUTRITION: ICD-10-CM

## 2025-05-30 DIAGNOSIS — D86.0 SARCOIDOSIS OF LUNG: ICD-10-CM

## 2025-05-30 DIAGNOSIS — D71 HYPERCALCEMIA DUE TO GRANULOMATOUS DISEASE: ICD-10-CM

## 2025-05-30 DIAGNOSIS — I10 PRIMARY HYPERTENSION: ICD-10-CM

## 2025-05-30 DIAGNOSIS — A31.0 MYCOBACTERIUM AVIUM COMPLEX: ICD-10-CM

## 2025-05-30 PROCEDURE — 99999 PR PBB SHADOW E&M-EST. PATIENT-LVL V: CPT | Mod: PBBFAC,HCNC,, | Performed by: INTERNAL MEDICINE

## 2025-05-30 NOTE — PROGRESS NOTES
[facility]     Outpatient Medical Oncology Note  Patient: Kylah Deal  MRN: [id number]  Primary Care Provider: Osmar Cruz MD  Chief Complaint: IgM Lambda MGUS     Subjective     Subjective:   HPI:   Kylah Deal is a 74 y.o. female with PMH significant for:   - Sarcoidosis (dx 4/2024, on mtx and prednisone)  - Pulmonary MAC (dx 11/2024, on azithromycin, ethambutol, rifabutin)  - CAD s/p CABG in 2000 and prior PCI (Plavix)  - DM2 (A1c: 6.8%, linagliptin and SSI)  - HTN (lisinopril)  - Possible COPD (PFTs - 2/8/24 - FEV1: 50-79%, mild restriction, decreased DLCO<40%)  - HTN  - Possible Seizure Disorder (Keppra)     She was originally followed by Medical Oncology as imaging in early 2024 performed in evaluation of hypercalcemia revealed a Enrrique Hepatic Mass, RML Pulmonary Mass, Adenopathy, and Bone Lesions. She underwent numerous biopsies including mediastinal node biopsy (3/1/24), enrrique hepatic mass biopsy (4/29/24) and bone marrow biopsy (4/29/24) revealing focal noncaseating granulomatous inflammation w/o overt malignancy, consistent with sarcoidosis. In evaluation, she was found to have an IgM lambda monoclonal protein and presents today for follow up.     Interval History:    Mrs. Deal is overall doing well. Her sarcoid symptoms have resolved on methotrexate and prednisone 5 mg daily. Her symptoms (cough, SOB) recur when weaning off prednisone    Patient's current symptoms are:  (1) Productive cough: chronic x numerous years, intermittent exacerbations, improved on steroids for sarcoidosis   (2) Fatigue: chronic and now stable  (3) MAHONEY: stable since early 2024, no overt chest pain.   (4) HA: She has occasional headaches w/o focal neurologic deficits. HA's are mild but regular, no associated visual changes, N/V, etc.  Stable since last visit.   (5) Occasional nightsweats: no F/C/adenopathy  (6) Epistaxis: resolved    Patient otherwise denies neuropathy, bone pain, fevers,  chills, chest pain, abdominal pain, N/V, diarrhea, constipation, weight loss, rashes.     Review of Systems:  14-point review of systems was asked with the pertinent positives and/or negatives stated in HPI.     MGUS History:  2024:  - SPEP: M protein: 0.21 g/dL, IgM lambda monoclonal band, KF.36, ratio: 1.13    2024:   - SPEP: no monoclonal protein detected for quantification, GRACE: faint IgM lambda monoclonal band present, KF.64, LF.53, Ratio: 1.07    May 2025  - SPEP: normal, no definitive peaks identified, GRACE: very faint IgM lambda monoclonal protein, - KF.95, ratio: 1.02, Ig (612), IgA: 72, IgM: 92, Serum viscosity normal     Sarcoid History:   2023: vague RUQ abdominal pain prompting a CT A/P - did not show biliary disease but did reveal opacities in right lung   2023: noted to be hypercalcemic (13) during [place] [facility] [place] hypercalcemia worsened in 2024 (14) prompting imaging below  2024: CT CAP w/ IV contrast: scattered pulmonary nodules/masses - largest is a 4.3 cm Right hilar/RML pulmonary mass w/ occlusion of the RML bronchus, mass in the anastacia hepatis (surrounds the portal vein, CBD dilation, extending into the RP surrounding the aorta/iliac and SMA; tumor does not appear to originate in the pancreas, could be conglomerate node or primary mass, numerous liver lesions c/f metastasis, mesenteric adenopathy, splenic infarcts   23: Bronchoscopy: PATHOLOGY: RLL lung bx, LLL lung bx, Right mainstem bronchus, BAL, negative for carcinoma or granulomatous disease  2024: PET/CT: Hypermetabolic infiltrative mass in the anastacia hepatis/RP (superior/posterior to pancreas, SUV: 7.3, surrounding CBD), lungs, right hilum, mediastinum, pleura, pericardium, liver (small, SUV: 3.4), mesenteric, RP, and pelvic adenopathy, left SC (SUV: 3.7), right neck (SUV: 2.4, inferior to parotid), diffuse interstitial involvement in both lungs (interstitial  thickening/nodularity, mildly thickened pleural w/ uptake), bone (right femoral neck, pelvis, spine, scapula, sternum, SUV: 5.8)  3/1/2024: CT guided core needle mediastinal lymph node biopsy - focal noncaseating granulomatous inflammation - results are discordant and remain concerning for malignancy.  3/18/24: CT chest: bilateral perihilar/peribronchial predominant irregular consolidations, stable, findings favored to represent sarcoidosis  3/27/24: Tumor Board: Recommend more tissue (EUS of PH mass vs. Bone) as findings in the lung may represent sarcoidosis but other lesions remain c/f malignancy  4/3/24: EUS: numerous enlarged nodes in middle paraesophageal mediastinum (8M), gastrohepatic ligament (18), and peripancreatic region - largest 1.3 cm, FNA performed (2 passes with 22 gauge needle); 3 cm mass in area of celiac/SMA takeoff (involved with arterial walls) - FNA performed of this mass as well  - no pathology in esophagus, stomach, duodenum, biliary tree, pancreas  PATHOLOGY: reactive histiocytic/myofibroblastic tissue with rare granulomas, no malignancy   4/29/24: Bone marrow biopsy:    PATHOLOGY: Cellular marrow with trilineage hematopoiesis and megakaryocytic hyperplasia, kappa restricted monotypic B lymphocytes (1.2%) and atypical plasma cells (<0.1%) detected by flow cytometric analysis, no amyloid, no granuloma; specimen is suboptimal  -- Comment: flow cytometry detects a kappa light chain restricted b lymphocyte population (1.2% of total sample) expressing CD19 and CD20, MYD88 mutation analysis negative - although no definitive evidence of lymphoma or plasma cell neoplassm in this sample, b cell lymphoma with plasmacytic differentiation cannot be excluded    Past Medical History:   - Sarcoidosis, Pulmonary MAC, CAD s/p CABG in 2000 and prior PCI (Plavix), T2DM, HTN (lisinopril), COPD (PFTs - 2/8/24 - FEV1: 50-79%, mild restriction, decreased DLCO<40%), GERD, HLD, HTN, Seizure Disorder (Dx in 2020,  "Arnoldppra), Osteoporosis (on Prolia)    Past Surgical History:    Cholecystectomy, POLINA, appendectomy, Left hip allison    Family History:   - Dad: Lung Cancer (smoker)  - Sister: Breast Cancer (40s)  - Maternal Grandmother: Colon Cancer (70s)  - Paternal Aunt: Colon (50s)  - Maternal Uncle: Cancer NOS    Social History:   Lives: [place]  Recently  (11/2023)  Children: Yes   Tobacco use: Former smoker - smoked from her 30s-50s, half pack per week, quit in 2008  Alcohol use: denies   Illicit drug use: denies      Allergies:  Review of patient's allergies indicates:   Allergen Reactions    Atorvastatin calcium Other (See Comments)     Pain    Metformin Diarrhea     Diarrhea and cramping    Rosuvastatin Other (See Comments)     pain    Statins-hmg-coa reductase inhibitors        Medications:  Current Outpatient Medications   Medication Instructions    albuterol (PROVENTIL) 2.5 mg, Nebulization, Every 6 hours PRN, Rescue    aspirin (ECOTRIN) 81 mg, Daily    azithromycin (ZITHROMAX) 500 mg, Oral, Every Mon, Wed, Fri    blood sugar diagnostic Strp Check blood glucose 4 times a day    budesonide-formoterol 160-4.5 mcg (SYMBICORT) 160-4.5 mcg/actuation HFAA 2 puffs, Inhalation, Every 12 hours, Controller    carvediloL (COREG) 12.5 MG tablet 1 tablet, 2 times daily with meals    cephALEXin (KEFLEX) 500 mg, Oral, Every 12 hours    cetirizine (ZYRTEC) 10 mg, Oral, Daily    clopidogreL (PLAVIX) 75 mg tablet 1 tablet, Daily    DROPLET PEN NEEDLE 32 gauge x 5/32" Ndle USE 1 EACH BY MISC ROUTE FOUR TIMES DAILY    ethambutoL (MYAMBUTOL) 1,200 mg, Oral, Every Mon, Wed, Fri    evolocumab (REPATHA SURECLICK SUBQ) Inject into the skin.    ezetimibe (ZETIA) 10 mg, Daily    fluticasone (VERAMYST) 27.5 mcg/actuation nasal spray 2 sprays, Daily    folic acid (FOLVITE) 1 mg, Oral, Daily    hydrALAZINE (APRESOLINE) 50 MG tablet 1 tablet, 2 times daily    insulin aspart U-100 (NOVOLOG) 2 Units, As needed (PRN)    ipratropium (ATROVENT) 21 mcg " "(0.03 %) nasal spray 2 sprays, Each Nostril, 3 times daily PRN    levETIRAcetam (KEPPRA) 500 mg, Oral, 2 times daily    lisinopriL (PRINIVIL,ZESTRIL) 40 MG tablet 1 tablet, Daily    methotrexate 15 mg, Oral, Every 7 days    montelukast (SINGULAIR) 10 mg    nitroGLYCERIN (NITROSTAT) 0.4 mg    pantoprazole (PROTONIX) 40 mg, Oral, Daily, Take 30 min before a meal    predniSONE (DELTASONE) 5 mg, Oral, Daily    PROLIA 60 mg, Every 6 months    rifabutin (MYCOBUTIN) 300 mg, Oral, Every Mon, Wed, Fri    sod sulf-pot chloride-mag sulf (SUTAB) 1.479-0.188- 0.225 gram tablet 12 tablets, Oral, Daily, Please follow Endoscopy 's instructions. Please apply coupon code. Please apply coupon code. BIN: [id number], PCN: [id number], GROUP: [id number], MEMBER ID: [id number]    TRADJENTA 5 mg, Daily    TRESIBA FLEXTOUCH U-100 100 unit/mL (3 mL) insulin pen SMARTSIG:3 Unit(s) SUB-Q Daily    triamcinolone acetonide 0.1% (KENALOG) 0.1 % paste Place small amount on oral ulcer twice daily. If medicine does not stick well to area, place ointment on a qtip and hold against ulcer for 5 minutes twice a day          Objective:   Vitals:   Vitals:    05/30/25 0909   BP: (!) 165/72   BP Location: Left arm   Patient Position: Sitting   Pulse: 66   Resp: 17   Temp: 98.1 °F (36.7 °C)   TempSrc: Oral   SpO2: 99%   Weight: 51.5 kg (113 lb 8.6 oz)   Height: 5' 1" (1.549 m)       BMI: Body mass index is 21.45 kg/m².  ECOG Performance Status: 1    Physical Exam   General Appearance:    Alert, cooperative, no distress    Head:    Normocephalic, without obvious abnormality, atraumatic   Throat:   Lips, mucosa, and tongue normal; teeth and gums normal   Neck:   Supple, symmetrical, no adenopathy;     thyroid:  no enlargement/tenderness/nodules   Lungs:     Clear to auscultation bilaterally, respirations unlabored    Heart:    Regular rate and rhythm, S1 and S2 normal   Abdomen:     Soft, non-tender, bowel sounds active, no masses, no organomegaly   Extremities: " "  Extremities normal, atraumatic, no cyanosis or edema   Pulses:   2+ and symmetric all extremities   Skin:   Skin color, texture, turgor normal, no rashes or lesions   Lymph nodes:   Cervical, supraclavicular, and axillary nodes normal   Neurologic:   CNII-XII intact, normal strength, sensation               Laboratory Data:  No visits with results within 1 Week(s) from this visit.   Latest known visit with results is:   Office Visit on 05/21/2025   Component Date Value Ref Range Status    Case Report 05/21/2025    Final                    Value:[facility] - Surgical                              Case: [id number]                                Authorizing Provider:  Scarlett Edward MD     Collected:           05/21/2025 03:24 PM          Ordering Location:     [facility] - Otolaryngology Received:            05/22/2025 09:05 AM          Pathologist:           Jana Chapman DO                                                    Specimen:    Mouth                                                                                      Clinical Information 05/21/2025    Final                    Value:non healing ulcer along alveolar ridge for over 4-5 weeks , no change with medical therapy    Final Diagnosis 05/21/2025    Final                    Value:Mouth, biopsy:   Ulcerated squamous mucosa  Separate fragments of chronically inflamed squamous mucosa with reactive change   No evidence of malignancy      Gross Description 05/21/2025    Final                    Value:1. Mouth:   MRN # on Epic Label:  [id number]  MRN # on Pathology Label:  [id number]    Received in formalin with the patient's name and MRN labeled" mouth" are 2 fragments of soft white tissue ranging in size from 0.3 0.6 cm in greatest dimension.  Entirely submitted [id number]      Grossing was completed by Donavan Leo on 5/22/2025.        Report Footnotes 05/21/2025    Final                    Value:Unless the case is a "gross only" or " additional testing only, the final diagnosis for each specimen is based on a microscopic examination of appropriate tissue sections.      Performing Location 2025    Final                    Value:Grossing performed at [facility], [address] [place], [address]    Sign out performed at [facility], [address] [place], [address]       Recent Labs   Lab Result Units 25  0849 25  1003 25  1031   WBC K/uL 5.30 5.82 4.66   HGB gm/dL 10.5* 10.8* 11.3*   HCT % 33.9* 34.7* 36.0*   Platelet Count K/uL 232 228 217     Recent Labs   Lab Result Units 25  0849 25  1003 25  1031   Creatinine mg/dL 1.1 1.0 1.0   AST unit/L 22 17 38   ALT unit/L 46* 17 48*     Recent Labs   Lab Result Units 25  0842   Iron Level ug/dL 84   Ferritin ng/mL 153.0        Imagin/10/2024: CT CAP: stable adenopathy - retrocrural and upper abdominal adenopathy extending to PH, stable soft tissue thickening with partial calcification around pulmonary arteries/mediastinum, extensive perilymphatic nodular and confluent lung opacities, stable left pleural thickening, innumerable subcm hepatic hypodensities too small to characterize  - other: stable mild intrahepatic biliary dilation, atherosclerosis, R nephrolithiasis - stable 1 cm staghorn kidney stone with proximal R hydroureter, mild left sided urothelial wall thickening    2024: CT CAP w/ IV contrast: scattered pulmonary nodules - largest is a 4.3 cm Right hilar/RML pulmonary mass w/ occlusion of the RML bronchus, mass in the anastacia hepatis (surrounds the portal vein, CBD dilation, extending into the RP surrounding the aorta/iliac and SMA; tumor does not appear to originate in the pancreas, could be conglomerate node or primary mass, numerous liver lesions c/f metastasis, mesenteric adenopathy, splenic infarcts     2024: PET/CT: Hypermetabolic infiltrative mass in the anastacia hepatis/RP  "(superior/posterior to pancreas, SUV: 7.3, surrounding CBD), lungs, right hilum, mediastinum, pleura, pericardium, liver (small, SUV: 3.4), mesenteric, RP, and pelvic adenopathy, left SC (SUV: 3.7), right neck (SUV: 2.4, inferior to parotid), diffuse interstitial involvement in both lungs (interstitial thickening/nodularity, mildly thickened pleural w/ uptake), bone (right femoral neck, pelvis, spine, scapula, sternum, SUV: 5.8)    3/18/24: CT chest w/o contrast: stable bilateral perihilar/peribronchial predominant irregular consolidations, mediastinal irregular peripheral soft tissue densities, small left pleural effusion, findings favored to represent sarcoidosis    Pathology:   3/1/2024: CT guided core needle mediastinal lymph node biopsy - focal noncaseating granulomatous inflammation, "one of the fragments show a few small non-caseating granulomas, no evidence of malignancy"    4/3/24: EUS: numerous enlarged nodes in middle paraesophageal mediastinum (8M), gastrohepatic ligament (18), and peripancreatic region - largest 1.3 cm, FNA performed (2 passes with 22 gauge needle); 3 cm mass in area of celiac/SMA takeoff (involved with arterial walls) - FNA performed of this mass as well  - no pathology in esophagus, stomach, duodenum, biliary tree, pancreas  PATHOLOGY: reactive histiocytic/myofibroblastic tissue with rare granulomas, no malignancy     4/29/24:   Bone marrow biopsy:  Cellular marrow with trilineage hematopoiesis and megakaryocytic hyperplasia, kappa restricted monotypic B lymphocytes (1.2%) and atypical plasma cells (<0.1%) detected by flow cytometric analysis, no amyloid, no granuloma; specimen is suboptimal  -- Comment: flow cytometry detects a kappa light chain restricted b lymphocyte population (1.2% of total sample) expressing CD19 and CD20, MYD88 mutation analysis negative - although no definitive evidence of lymphoma or plasma cell neoplassm in this sample, b cell lymphoma with plasmacytic " differentiation cannot be excluded    Labs:   2024:  - CBC: wnl CMP: Calcium: 11.9 (10.5), albumin: 3.6, Smear: wnl, ferritin: 216, TSAT: 13%  - SPEP: M protein: 0.21 g/dL, IgM lambda monoclonal band, KF.36, ratio: 1.13  - Beta 2 microglobulin: 4.5, uric acid: 2.7, LDH: 148  - : 83, , CEA and AFP: wnl  - HIV and hepatitis testing: negative    2024:   - CBC: WBC: 8, Hgb: 9.5, MCV: 92, platelets: 328  - CMP: glucose: 171, Cr: 1.2, GFR: 47  - SPEP: no monoclonal protein detected for quantification, GRACE: faint IgM lambda monoclonal band present  - KF.64, LF.53, Ratio: 1.07  - Ig, IgA: 94, IgM: 92  - LDH: 170  - TSAT: 16% (13%), ferritin: 210    May 2025  - CBC: Hgb: 10.8, MCV: 94, WBC: 5, platelets: 228   - CMP: Cr: 1, GFR: 59, LFTs: wnl  - Ferritin: 153, TSAT: 24%   - KF.95, ratio: 1.02, SPEP: normal, no definitive peaks identified, GRACE: very faint IgM lambda monoclonal protein  - Ig (612), IgA: 72, IgM: 92, Serum viscosity normal   - : 24 (83)      Assessment   Assessment and Plan:   Kylah Deal is a 74 y.o. female former smoker with Sarcoidosis (dx 2024, on mtx, prednisone), pulmonary MAC (dx 2024, on abx), CAD s/p CABG, DM2, HTN, COPD, HTN, Possible Seizure Disorder (Keppra). She was originally followed by Medical Oncology as imaging in early  performed in evaluation of hypercalcemia revealed numerous abnormalities including a Enrrique Hepatic Mass, RML Pulmonary Mass, Adenopathy, and Bone Lesions. She underwent numerous biopsies including mediastinal node biopsy (3/1/24), enrrique hepatic mass biopsy (24) and bone marrow biopsy (24) revealing focal noncaseating granulomatous inflammation w/o overt malignancy, consistent with sarcoidosis. Given bone lesions, SPEP performed and IgM lambda monoclonal protein detected. She presents today for MGUS follow up.     # MGUS  - SPEP: M protein: 0.21 g/dL, IgM lambda monoclonal band, KF.36, ratio:  1.13; stable on Nov labs  - Bone marrow biopsy in April 2024 without overt evidence of lymphoma or plasma cell neoplasm. However, there is note of kappa restricted monotypic B lymphocytes (1.2%) and atypical plasma cells, MYD88 mutational analysis negative but LPL or Waldenstrom macroglobulinemia, (indolent form of lymphoma) cannot be excluded based on current limited sample    - History and findings on imaging most consistent with sarcoidosis in light of extensive negative malignancy workup; however, given extrapulmonary disease/adenopathy and results of bone marrow biopsy above, discussed possibility of an indolent lymphoproliferative disorder (LPL)   - May 2025 labs stable   - CTM with MGUS labs in 6 months   - CTA chest on 11/6/24 with worsening soft tissue thickening in the mediastinum/perilymphatic nodularity in LLL and RUL c/f worsening sarcoidosis; improved on CT chest 5/2025. Previously dicussed with pulmonary, Dr. Romero, that if imaging discordant in the future (particularly worsening adenopathy compared to pulmonary disease or new bone lesions), would need updated malignancy workup    # Other Co-Morbidities:  - Hypercalcemia: 2/2 sarcoidosis, s/p denosumab in Jan and May 2024, CTM, per pulmonary   - Normocytic anemia: likely ACD in light of above, CTM  - Epistaxis: resolved, per ENT  - Oral ulcer: started around 4/2025, possibly due to mtx vs. Sarcoid vs. Other, following with ENT  - Sarcoidosis: on mtx and prednisone, followed closely by pulmonary  - Pulmonary MAC: on azithromycin, ethambutol, rifabutin, per ID   - Nephrolithiasis: noted on CT 5/2024, s/p lithotripsy and ureter stent placement 10/2024, per urology  - CAD s/p CABG (Plavix), DM2 (linagliptin), HTN (lisinopril), COPD (PFTs - 2/8/24 - FEV1: 50-79%, mild restriction, decreased DLCO<40%), HTN, Possible Seizure Disorder (Keppra): stable on current regimen, per PCP (outside of [facility])  - Genetics: pending above, consider genetic testing given  strong FH  - Other Cancer Screening: Colonoscopy: 3/22/2019, MM2022, Pap: POLINA in 1985    Follow Up:  - rtc in 6 months w/ mgus and anemia labs   - message Naomi Bowling on mouth ulcer and his plan to repeat scans  [] genetics      Med Onc Chart Routing      Follow up with physician . RTC in 2025 - recall list with labs at least one week prior   Follow up with [facility]    Infusion scheduling note    Injection scheduling note    Labs   Scheduling:  Preferred lab:  Lab interval:  All labs ordered during today's encounter one week prior to return visit   Imaging    Pharmacy appointment    Other referrals               MDM includes:    - Acute or chronic illness or injury that poses a threat to life or bodily function  - Consideration and discussion of significant complications based on comorbidities  - Review of prior external notes from unique source and review of diagnostic tests and information  - Independent review and explanation of 3+ results from unique tests   - Discussion of management and ordering 3+ unique tests   - Extensive discussion of treatment and management including consideration of possible diagnoses and management options    - Patient seen in diagnosis clinic.   - Overall, I discussed the diagnosis, history, stage, labs/imaging, prognosis, management, and treatment plan as applicable. I reviewed adverse short and long term effects as applicable.   - Informed patient if symptoms are getting worse that it is their responsibility to call the clinic and schedule follow up sooner than stated follow up. Also informed patient if they do not hear from the appointment center in 2-5 business days for their referrals, the patient must call the Oncology clinic so we can follow up on procedures or referral scheduling. Patient was fully informed of current medical plan, all questions were answered and patient verbalized understanding. No further questions.   - Face to Face Visit time I spent with the  patient: 30 minutes of total time spent on the encounter, including counseling patient and/or coordinating care, which includes face to face time and non-face to face time preparing to see the patient (eg, review of tests), Obtaining and/or reviewing separately obtained history, Documenting clinical information in the electronic or other health record, Independently interpreting results (not separately reported) and communicating results to the patient/family/caregiver, or Care coordination (not separately reported).     Signed   Karla Sanchez MD  [facility]

## 2025-05-30 NOTE — Clinical Note
Jesse Salgado I met Mrs. Deal in onc clinic prior to her sarcoid diagnosis. I've been following her for a very faint monoclonal protein identified in workup of the bone lesions.   She has had a few weeks of on a oral ulcer, she's on the mtx so wanted to make sure you're aware.   I also wanted to see if you're planning to repeat imaging at any point this year for her sarcoid? If you are and there are an discordant results, would you mind forwarding the results to me? She had a bone marrow biopsy that showed some atypical plasma cells so just want to make sure something called LPL isnt contributing to her adenopathy and bone lesions  Thanks!

## 2025-06-02 ENCOUNTER — OFFICE VISIT (OUTPATIENT)
Dept: UROLOGY | Facility: CLINIC | Age: 75
End: 2025-06-02
Payer: MEDICARE

## 2025-06-02 VITALS — BODY MASS INDEX: 21.81 KG/M2 | WEIGHT: 115.44 LBS

## 2025-06-02 DIAGNOSIS — N20.0 RENAL CALCULI: Primary | ICD-10-CM

## 2025-06-02 DIAGNOSIS — R39.11 URINARY HESITANCY: ICD-10-CM

## 2025-06-02 LAB
AMORPH CRY UR QL COMP ASSIST: ABNORMAL
BACTERIA #/AREA URNS AUTO: ABNORMAL /HPF
BILIRUB UR QL STRIP.AUTO: NEGATIVE
CLARITY UR: ABNORMAL
COLOR UR AUTO: YELLOW
GLUCOSE UR QL STRIP: NEGATIVE
HGB UR QL STRIP: NEGATIVE
KETONES UR QL STRIP: NEGATIVE
LEUKOCYTE ESTERASE UR QL STRIP: ABNORMAL
MICROSCOPIC COMMENT: ABNORMAL
NITRITE UR QL STRIP: POSITIVE
PH UR STRIP: 6 [PH]
PROT UR QL STRIP: NEGATIVE
SP GR UR STRIP: 1.01
SQUAMOUS #/AREA URNS AUTO: 1 /HPF
UROBILINOGEN UR STRIP-ACNC: NEGATIVE EU/DL
WBC #/AREA URNS AUTO: 5 /HPF (ref 0–5)

## 2025-06-02 PROCEDURE — 99999 PR PBB SHADOW E&M-EST. PATIENT-LVL III: CPT | Mod: PBBFAC,,, | Performed by: STUDENT IN AN ORGANIZED HEALTH CARE EDUCATION/TRAINING PROGRAM

## 2025-06-02 PROCEDURE — 1101F PT FALLS ASSESS-DOCD LE1/YR: CPT | Mod: CPTII,S$GLB,, | Performed by: STUDENT IN AN ORGANIZED HEALTH CARE EDUCATION/TRAINING PROGRAM

## 2025-06-02 PROCEDURE — 87186 SC STD MICRODIL/AGAR DIL: CPT | Performed by: STUDENT IN AN ORGANIZED HEALTH CARE EDUCATION/TRAINING PROGRAM

## 2025-06-02 PROCEDURE — 3288F FALL RISK ASSESSMENT DOCD: CPT | Mod: CPTII,S$GLB,, | Performed by: STUDENT IN AN ORGANIZED HEALTH CARE EDUCATION/TRAINING PROGRAM

## 2025-06-02 PROCEDURE — 99213 OFFICE O/P EST LOW 20 MIN: CPT | Mod: S$GLB,,, | Performed by: STUDENT IN AN ORGANIZED HEALTH CARE EDUCATION/TRAINING PROGRAM

## 2025-06-02 PROCEDURE — 1159F MED LIST DOCD IN RCRD: CPT | Mod: CPTII,S$GLB,, | Performed by: STUDENT IN AN ORGANIZED HEALTH CARE EDUCATION/TRAINING PROGRAM

## 2025-06-02 PROCEDURE — 81001 URINALYSIS AUTO W/SCOPE: CPT | Performed by: STUDENT IN AN ORGANIZED HEALTH CARE EDUCATION/TRAINING PROGRAM

## 2025-06-04 LAB — BACTERIA UR CULT: ABNORMAL

## 2025-06-05 ENCOUNTER — RESULTS FOLLOW-UP (OUTPATIENT)
Dept: UROLOGY | Facility: HOSPITAL | Age: 75
End: 2025-06-05

## 2025-06-05 ENCOUNTER — PATIENT MESSAGE (OUTPATIENT)
Dept: UROLOGY | Facility: CLINIC | Age: 75
End: 2025-06-05
Payer: MEDICARE

## 2025-06-05 ENCOUNTER — OFFICE VISIT (OUTPATIENT)
Dept: OTOLARYNGOLOGY | Facility: CLINIC | Age: 75
End: 2025-06-05
Payer: MEDICARE

## 2025-06-05 ENCOUNTER — TELEPHONE (OUTPATIENT)
Dept: UROLOGY | Facility: CLINIC | Age: 75
End: 2025-06-05
Payer: MEDICARE

## 2025-06-05 VITALS
SYSTOLIC BLOOD PRESSURE: 132 MMHG | HEIGHT: 61 IN | DIASTOLIC BLOOD PRESSURE: 76 MMHG | WEIGHT: 114.31 LBS | BODY MASS INDEX: 21.58 KG/M2

## 2025-06-05 DIAGNOSIS — K12.30 MUCOSITIS: Primary | ICD-10-CM

## 2025-06-05 DIAGNOSIS — D84.9 IMMUNOSUPPRESSION: ICD-10-CM

## 2025-06-05 DIAGNOSIS — K12.1 ORAL ULCER: ICD-10-CM

## 2025-06-05 DIAGNOSIS — D86.9 SARCOID: ICD-10-CM

## 2025-06-05 DIAGNOSIS — E53.8 DEFICIENCY OF OTHER SPECIFIED B GROUP VITAMINS: ICD-10-CM

## 2025-06-05 DIAGNOSIS — N39.0 RECURRENT UTI: Primary | ICD-10-CM

## 2025-06-05 PROCEDURE — 1101F PT FALLS ASSESS-DOCD LE1/YR: CPT | Mod: CPTII,S$GLB,, | Performed by: OTOLARYNGOLOGY

## 2025-06-05 PROCEDURE — 3075F SYST BP GE 130 - 139MM HG: CPT | Mod: CPTII,S$GLB,, | Performed by: OTOLARYNGOLOGY

## 2025-06-05 PROCEDURE — 1159F MED LIST DOCD IN RCRD: CPT | Mod: CPTII,S$GLB,, | Performed by: OTOLARYNGOLOGY

## 2025-06-05 PROCEDURE — 3078F DIAST BP <80 MM HG: CPT | Mod: CPTII,S$GLB,, | Performed by: OTOLARYNGOLOGY

## 2025-06-05 PROCEDURE — 99213 OFFICE O/P EST LOW 20 MIN: CPT | Mod: S$GLB,,, | Performed by: OTOLARYNGOLOGY

## 2025-06-05 PROCEDURE — 3288F FALL RISK ASSESSMENT DOCD: CPT | Mod: CPTII,S$GLB,, | Performed by: OTOLARYNGOLOGY

## 2025-06-05 RX ORDER — CEPHALEXIN 500 MG/1
500 CAPSULE ORAL EVERY 12 HOURS
Qty: 10 CAPSULE | Refills: 0 | Status: SHIPPED | OUTPATIENT
Start: 2025-06-05 | End: 2025-06-10

## 2025-06-05 NOTE — PROGRESS NOTES
----- Message from Fuad Cardoza MD sent at 11/28/2023 10:19 AM CST -----  Please notify patient that:  1. Glucose is high.  Recommend a1c in future.  2. LDL elevated.  10 year risk estimated to be 1.9%.  This is low.  3. Everything else looks good.    Thanks.  Fuad Cardoza MD 11/28/2023 10:18 AM     Pls alert patient abx sent for bacteria in her urine

## 2025-06-05 NOTE — PROGRESS NOTES
OTOLARYNGOLOGY CLINIC NOTE  Date:  06/05/2025     Chief complaint:  Chief Complaint   Patient presents with    Mouth Lesions     2wk f/u- mouth ulcer s/p office bx     Otalgia     Left        History of Present Illness  Kylah Deal is a 74 y.o. female  presenting today for a followup.    Saline ; does afrin a couple of days. Had bleeding with flonase and astelin so stopped   Sometimes does atrovent   Having a lot of ear pain no drainage; No itching . Pain in left ear a couple of days ago; Ears still feel blocked   Sore has nevere really gone away even after biopsy  On mtx and prednisone     I last saw the patient on 5-21-25 Below text is copied from  note on that date describing history of present illness at that time :  Some days its not bad , if she touches it; some days it does still hurt but seems like overall making progress in the right direction   Has been using the paste  Has been having a little bit of bleeding from the nose--left side when puts qtip in nose or wiping nose or blows but not large bleed. Congestion is better and ears feel better . Has been doing afrin a couple days then stopping and then again ; still has some congestion but making progress overall and ears feeling better and hearing getting tob aseline. Still has symptoms but improved from prior         I last saw the patient on 5-6-25. Below text is copied from  note on that date describing history of present illness at that time :  At last visit on 4-25-25 she had developed a tongue ulcer. I gave her prednisolone rinse and nystatin and she is here today for repeat evaluation and possible biopsy if ulcer has not resolved. I advised her to start a saline rinse and also gave her atrovent to use prn for rhinitis.      Bilateral ear fullness and can hear her self talking. Sometimes has some congestion in the nose . Left ear baseline is really bad hearing she states but now with current issue can't hear at all. By Sunday after she saw me on  Friday ears were blocked. Has been having difficulty with hearing in both ears. Sometimes she can autoinsufflate and might be a little better for a couple  of minutes      Does saline mist spray prior to azelastine and flonase bid and the atrovent prn for runny nose . Rhinitis has improved. Still with congestion ; still has a little bit of cough . She saw infectious disease md Tuesday and cxr was ordered. She was sent a rx for augmentin.      Cough had been better when had been better when she had been on steroid  She takes zyrtec in am and singulair      I last saw the patient on 4-25-25. Below text is copied from  note on that date describing history of present illness at that time :  Lots of rhinitis  Lower left side of mouth under teeth ulcer has been there for about 2-3 weeks   Carbonated drinks sweet and salty burn the mouth really bad.   Saw dentist and told it looked like it was healing   At times area throbs . Dentist said teeth themselves looked ok      Cough has been worsening, she is not sure if exacerbation of sarcoid or MAC but waking up with cough and phlegm   Plan was to stop the steroid but symptoms come back when stops it  Gets sob and fatigue even on the 5 mg dose ; had been doing well up until 10 days ago   Nasal sprays havbe been helping   Saline Flonase and astelin bid most time sate last once a day      I last saw the patient on 12-23 - 24. Below text is copied from  note on that date describing history of present illness at that time :Has not had any large bleeds ; on occasion feels nose running and blots nose and blood in the snot.  Has been going on from mid to late September and also had drip; shortly after started on astelin started having nosebleeds. Sometimes goes a few days without bleeding. Sometimes when blows nose in bright red blood. Sometimes gets kleenex and dabs in nose; she can feel the scab in   Sometimes gets clots that come out - has flores a week to 10 days  Does not really  dripout the front of  ; nose is wet      Had been on flonase at time used astelin but not recently  Has been on singulair but still gets congestion and sinus infections- usually has problem in the winter     Has cough issues - has not done her nebulizer this am      Had done zyrtec and flonase in the past and that did help     It is typically the right side ; on occasion gets a tinge from the left but clots and scabbing on the right   Had issues with gerd and had been on pantoprazole and past year had to change insurance for pulm issues just ran out of pantoprazole a couple of days ago usually when would go off of it would have take mylanta at night      Has a history of sarcoid and MAC- was diagnosed in January     Past Medical History  Past Medical History:   Diagnosis Date    CAD (coronary artery disease)     Diabetes mellitus     Hypertension     Migraine     Seizures         Past Surgical History  Past Surgical History:   Procedure Laterality Date    APPENDECTOMY      CHOLECYSTECTOMY      CORONARY ANGIOPLASTY WITH STENT PLACEMENT  2017    CORONARY ARTERY BYPASS GRAFT      ENDOSCOPIC ULTRASOUND OF UPPER GASTROINTESTINAL TRACT N/A 04/03/2024    Procedure: ULTRASOUND, UPPER GI TRACT, ENDOSCOPIC;  Surgeon: Lavell Lomax MD;  Location: [facility];  Service: Endoscopy;  Laterality: N/A;  4/1 portal- EUS-guided biopsy of anastacia hepatis mass.    HEEL SPUR EXCISION  1997    HIP FRACTURE SURGERY Left 2018    HYSTERECTOMY      TONSILLECTOMY      URETEROSCOPIC REMOVAL OF URETERIC CALCULUS Right 10/3/2024    Procedure: REMOVAL, CALCULUS, URETER, URETEROSCOPIC Laser lithotripsy Retrograde pyelogram Ureteral stent placement;  Surgeon: Annamarie Lake MD;  Location: [facility] OR;  Service: Urology;  Laterality: Right;  holmium laser 365 madrigal, time 2:13, energy 0.76kj    URETHRAL SLING  2022    WRIST SURGERY Right 2005        Medications  Medications Ordered Prior to Encounter[1]    Review of Systems  Review of Systems  "  Constitutional: Negative.    HENT:  Positive for ear pain and nosebleeds.    Eyes:  Positive for photophobia.   Respiratory:  Positive for cough and shortness of breath.    Cardiovascular: Negative.    Gastrointestinal: Negative.    Musculoskeletal:  Positive for back pain.   Skin: Negative.    Endo/Heme/Allergies:  Bruises/bleeds easily.   Psychiatric/Behavioral: Negative.          Answers submitted by the patient for this visit:  Review of Symptoms Questionnaire  (Submitted on 6/1/2025)  mouth sores: Yes  Difficulty urinating?: Yes  Seasonal Allergies?: Yes  Cold all of the time? : Yes  Light-headedness: Yes  Social History   reports that she quit smoking about 17 years ago. Her smoking use included cigarettes. She has never used smokeless tobacco. She reports current alcohol use of about 1.0 standard drink of alcohol per week. She reports that she does not use drugs.     Family History  Family History   Problem Relation Name Age of Onset    Lung cancer Father      Breast cancer Sister      Cancer Maternal Uncle      Cancer Paternal Aunt      Cancer Maternal Grandmother          Physical Exam   Vitals:    06/05/25 1510   BP: 132/76    Body mass index is 21.6 kg/m².  Weight: 51.9 kg (114 lb 4.9 oz)   Height: 5' 1" (154.9 cm)     GENERAL: no acute distress.  HEAD: normocephalic.   EYES: No scleral icterus  EARS: external ear without lesion, normal pinna shape and position.  External auditory canal with minimal to no cerumen, tympanic membrane fully visible, no perforation , no retraction. No middle ear effusion. Ossicles intact.   NOSE: external nose without significant bony abnormality  ORAL CAVITY/OROPHARYNX: tongue mobile. Ulcer slightly healed but still present  NECK: trachea midline.   LYMPH NODES:No cervical lymphadenopathy.  RESPIRATORY: no stridor, no stertor. Voice normal. Respirations nonlabored.  NEURO: alert, responds to questions appropriately.    PSYCH:mood appropriate      Pathology  Mouth, biopsy: "   Ulcerated squamous mucosa  Separate fragments of chronically inflamed squamous mucosa with reactive change   No evidence of malignancy  Imaging:  The patient does not have any new imaging of the head and neck since last visit.     Labs:  CBC  Recent Labs   Lab 04/11/25  0849 04/25/25  1003 05/12/25  1031   WBC 5.30 5.82 4.66   HGB 10.5 L 10.8 L 11.3 L   HCT 33.9 L 34.7 L 36.0 L   MCV 94 94 94   Platelet Count 232 228 217     BMP  Recent Labs   Lab 11/06/24  1926 11/07/24  0835 04/11/25  0849 04/25/25  1003 05/12/25  1031   Glucose 109   < > 140 H 115 H 126 H   Sodium 139   < > 141 141 144   Potassium 4.2   < > 4.9 4.7 5.6 H   Chloride 103   < > 107 106 107   CO2 26   < > 27 27 25   Carbon Dioxide  --    < >  --   --   --    BUN 18   < > 35 H 21 23   Blood Urea Nitrogen  --    < >  --   --   --    Creatinine 1.1   < > 1.1 1.0 1.0   Calcium 9.7   < > 9.4 9.5 10.0   Magnesium 1.9  --   --   --   --     < > = values in this interval not displayed.     COAGS  Recent Labs   Lab 02/22/24  1223 03/27/24  1251 05/23/24  0922   INR 1.0 1.0 0.9       Assessment  1. Mucositis  - VITAMIN B12; Future    2. Deficiency of other specified B group vitamins  - VITAMIN B12; Future    3. Oral ulcer    4. Sarcoid    5. Immunosuppression       Plan:  Discussed plan of care with patient in detail and all questions answered. Patient reported understanding of plan of care. I gave the patient the opportunity to ask questions and patient confirmed all questions answered to satisfaction.     Reviewed path results  Can try salt water rinse- not sure may worsen so advised her to do things at different times- ie  after a week on dental paste can use silver nitrate and then paste again   Restart dental paste for now    Article with suggestion that 5 mg of folate can help with oral mucositis for patients on methotrexate with ulcers- advised her to contact prescribing md    Trial of sublingual b12; will check labs for that as well  but some studies  show improvement using sublingual lozenges even if levels not low      If gets signifiacnt worsening tomorrow can send ear drop rx- she  did feel tender when put speculum in ear but I did not se redness nor swelling to suggest aoe    F/u 4 months to monitor, advised also to do exams at home and notify me if any worsening discussed potential false negative biopsy. Also counseled to look out for bleeding or increase pain       I spent a total of 20 minutes on the day of the visit   This includes face to face time and non-face to face time preparing to see the patient (eg, review of tests), obtaining and/or reviewing separately obtained history, documenting clinical information in the electronic or other health record, independently interpreting results and communicating results to the patient/family/caregiver, or care coordinator.   Please be aware that this note has been generated with the assistance of James voice-to-text.  Please excuse any spelling or grammatical errors.           [1]   Current Outpatient Medications on File Prior to Visit   Medication Sig Dispense Refill    albuterol (PROVENTIL) 2.5 mg /3 mL (0.083 %) nebulizer solution Take 3 mLs (2.5 mg total) by nebulization every 6 (six) hours as needed for Wheezing. Rescue 120 mL 5    aspirin (ECOTRIN) 81 MG EC tablet Take 81 mg by mouth once daily.      azithromycin (ZITHROMAX) 500 MG tablet Take 1 tablet (500 mg total) by mouth every Mon, Wed, Fri. 12 tablet 11    blood sugar diagnostic Strp Check blood glucose 4 times a day      budesonide-formoterol 160-4.5 mcg (SYMBICORT) 160-4.5 mcg/actuation HFAA Inhale 2 puffs into the lungs every 12 (twelve) hours. Controller 10.2 g 3    carvediloL (COREG) 12.5 MG tablet Take 1 tablet by mouth 2 (two) times daily with meals.      cetirizine (ZYRTEC) 10 MG tablet Take 1 tablet (10 mg total) by mouth once daily. 30 tablet 11    clopidogreL (PLAVIX) 75 mg tablet Take 1 tablet by mouth once daily.      denosumab  "(PROLIA) 60 mg/mL Syrg Inject 60 mg into the skin every 6 (six) months.      DROPLET PEN NEEDLE 32 gauge x 5/32" Ndle USE 1 EACH BY MISC ROUTE FOUR TIMES DAILY      ethambutoL (MYAMBUTOL) 400 MG Tab Take 3 tablets (1,200 mg total) by mouth every Mon, Wed, Fri. 36 tablet 11    evolocumab (REPATHA SURECLICK SUBQ) Inject into the skin.      ezetimibe (ZETIA) 10 mg tablet Take 10 mg by mouth once daily.      fluticasone (VERAMYST) 27.5 mcg/actuation nasal spray 2 sprays by Nasal route once daily.      folic acid (FOLVITE) 1 MG tablet Take 1 tablet (1 mg total) by mouth once daily. 360 tablet 1    hydrALAZINE (APRESOLINE) 50 MG tablet Take 1 tablet by mouth 2 (two) times daily.      insulin aspart U-100 (NOVOLOG) 100 unit/mL (3 mL) InPn pen Inject 2 Units into the skin as needed.      ipratropium (ATROVENT) 21 mcg (0.03 %) nasal spray 2 sprays by Each Nostril route 3 (three) times daily as needed for Rhinitis. 30 mL 1    levETIRAcetam (KEPPRA) 500 MG Tab Take 1 tablet (500 mg total) by mouth 2 (two) times daily. 180 tablet 3    linaGLIPtin (TRADJENTA) 5 mg Tab tablet Take 5 mg by mouth once daily.      lisinopriL (PRINIVIL,ZESTRIL) 40 MG tablet Take 1 tablet by mouth once daily.      montelukast (SINGULAIR) 10 mg tablet Take 10 mg by mouth.      nitroGLYCERIN (NITROSTAT) 0.4 MG SL tablet Place 0.4 mg under the tongue.      pantoprazole (PROTONIX) 40 MG tablet Take 1 tablet (40 mg total) by mouth once daily. Take 30 min before a meal 90 tablet 3    predniSONE (DELTASONE) 5 MG tablet Take 1 tablet (5 mg total) by mouth once daily. 30 tablet 3    rifabutin (MYCOBUTIN) 150 mg Cap Take 2 capsules (300 mg total) by mouth every Mon, Wed, Fri. 24 capsule 11    TRESIBA FLEXTOUCH U-100 100 unit/mL (3 mL) insulin pen SMARTSIG:3 Unit(s) SUB-Q Daily      triamcinolone acetonide 0.1% (KENALOG) 0.1 % paste Place small amount on oral ulcer twice daily. If medicine does not stick well to area, place ointment on a qtip and hold against " ulcer for 5 minutes twice a day 5 g 1    methotrexate 2.5 MG Tab Take 6 tablets (15 mg total) by mouth every 7 days. 72 tablet 0    sod sulf-pot chloride-mag sulf (SUTAB) 1.479-0.188- 0.225 gram tablet Take 12 tablets by mouth once daily. Please follow Endoscopy 's instructions. Please apply coupon code. Please apply coupon code. BIN: [id number], PCN: [id number], GROUP: [id number], MEMBER ID: [id number] 24 tablet 0     No current facility-administered medications on file prior to visit.

## 2025-06-09 ENCOUNTER — PATIENT MESSAGE (OUTPATIENT)
Dept: HEMATOLOGY/ONCOLOGY | Facility: CLINIC | Age: 75
End: 2025-06-09
Payer: MEDICARE

## 2025-06-09 PROBLEM — R09.81 NASAL CONGESTION: Status: RESOLVED | Noted: 2025-05-08 | Resolved: 2025-06-09

## 2025-06-12 ENCOUNTER — LAB VISIT (OUTPATIENT)
Dept: LAB | Facility: HOSPITAL | Age: 75
End: 2025-06-12
Attending: INTERNAL MEDICINE
Payer: MEDICARE

## 2025-06-12 DIAGNOSIS — D86.0 SARCOIDOSIS OF LUNG: ICD-10-CM

## 2025-06-12 DIAGNOSIS — E53.8 DEFICIENCY OF OTHER SPECIFIED B GROUP VITAMINS: ICD-10-CM

## 2025-06-12 DIAGNOSIS — E11.9 TYPE 2 DIABETES MELLITUS WITHOUT COMPLICATION: ICD-10-CM

## 2025-06-12 DIAGNOSIS — K12.30 MUCOSITIS: ICD-10-CM

## 2025-06-12 LAB
ABSOLUTE EOSINOPHIL (OHS): 0.13 K/UL
ABSOLUTE MONOCYTE (OHS): 0.43 K/UL (ref 0.3–1)
ABSOLUTE NEUTROPHIL COUNT (OHS): 1.88 K/UL (ref 1.8–7.7)
ALBUMIN SERPL BCP-MCNC: 3.2 G/DL (ref 3.5–5.2)
ALP SERPL-CCNC: 65 UNIT/L (ref 40–150)
ALT SERPL W/O P-5'-P-CCNC: 25 UNIT/L (ref 10–44)
ANION GAP (OHS): 9 MMOL/L (ref 8–16)
AST SERPL-CCNC: 20 UNIT/L (ref 11–45)
BASOPHILS # BLD AUTO: 0.01 K/UL
BASOPHILS NFR BLD AUTO: 0.3 %
BILIRUB SERPL-MCNC: 0.4 MG/DL (ref 0.1–1)
BUN SERPL-MCNC: 21 MG/DL (ref 8–23)
CALCIUM SERPL-MCNC: 9.6 MG/DL (ref 8.7–10.5)
CHLORIDE SERPL-SCNC: 107 MMOL/L (ref 95–110)
CO2 SERPL-SCNC: 26 MMOL/L (ref 23–29)
CREAT SERPL-MCNC: 0.9 MG/DL (ref 0.5–1.4)
ERYTHROCYTE [DISTWIDTH] IN BLOOD BY AUTOMATED COUNT: 15.4 % (ref 11.5–14.5)
GFR SERPLBLD CREATININE-BSD FMLA CKD-EPI: >60 ML/MIN/1.73/M2
GLUCOSE SERPL-MCNC: 148 MG/DL (ref 70–110)
HCT VFR BLD AUTO: 34.3 % (ref 37–48.5)
HGB BLD-MCNC: 11 GM/DL (ref 12–16)
IMM GRANULOCYTES # BLD AUTO: 0.01 K/UL (ref 0–0.04)
IMM GRANULOCYTES NFR BLD AUTO: 0.3 % (ref 0–0.5)
LYMPHOCYTES # BLD AUTO: 0.75 K/UL (ref 1–4.8)
MCH RBC QN AUTO: 30.1 PG (ref 27–31)
MCHC RBC AUTO-ENTMCNC: 32.1 G/DL (ref 32–36)
MCV RBC AUTO: 94 FL (ref 82–98)
NUCLEATED RBC (/100WBC) (OHS): 0 /100 WBC
PLATELET # BLD AUTO: 214 K/UL (ref 150–450)
PMV BLD AUTO: 10.6 FL (ref 9.2–12.9)
POTASSIUM SERPL-SCNC: 4.9 MMOL/L (ref 3.5–5.1)
PROT SERPL-MCNC: 6.7 GM/DL (ref 6–8.4)
RBC # BLD AUTO: 3.66 M/UL (ref 4–5.4)
RELATIVE EOSINOPHIL (OHS): 4 %
RELATIVE LYMPHOCYTE (OHS): 23.4 % (ref 18–48)
RELATIVE MONOCYTE (OHS): 13.4 % (ref 4–15)
RELATIVE NEUTROPHIL (OHS): 58.6 % (ref 38–73)
SODIUM SERPL-SCNC: 142 MMOL/L (ref 136–145)
VIT B12 SERPL-MCNC: 283 PG/ML (ref 210–950)
WBC # BLD AUTO: 3.21 K/UL (ref 3.9–12.7)

## 2025-06-12 PROCEDURE — 36415 COLL VENOUS BLD VENIPUNCTURE: CPT | Mod: HCNC

## 2025-06-12 PROCEDURE — 82607 VITAMIN B-12: CPT | Mod: HCNC

## 2025-06-12 PROCEDURE — 85025 COMPLETE CBC W/AUTO DIFF WBC: CPT | Mod: HCNC

## 2025-06-12 PROCEDURE — 80053 COMPREHEN METABOLIC PANEL: CPT | Mod: HCNC

## 2025-06-12 PROCEDURE — 87015 SPECIMEN INFECT AGNT CONCNTJ: CPT | Mod: HCNC

## 2025-06-12 PROCEDURE — 87206 SMEAR FLUORESCENT/ACID STAI: CPT | Mod: HCNC

## 2025-06-13 DIAGNOSIS — E11.9 TYPE 2 DIABETES MELLITUS WITHOUT COMPLICATION, UNSPECIFIED WHETHER LONG TERM INSULIN USE: Primary | ICD-10-CM

## 2025-06-13 LAB — ACID FAST MOD KINY STN SPEC: NORMAL

## 2025-06-19 LAB — MYCOBACTERIUM SPEC QL CULT: NORMAL

## 2025-06-24 ENCOUNTER — PATIENT MESSAGE (OUTPATIENT)
Dept: PULMONOLOGY | Facility: CLINIC | Age: 75
End: 2025-06-24
Payer: MEDICARE

## 2025-06-24 DIAGNOSIS — D86.0 SARCOIDOSIS OF LUNG: ICD-10-CM

## 2025-06-24 RX ORDER — METHOTREXATE 2.5 MG/1
15 TABLET ORAL
Qty: 72 TABLET | Refills: 0 | Status: SHIPPED | OUTPATIENT
Start: 2025-06-24 | End: 2025-09-22

## 2025-06-24 NOTE — TELEPHONE ENCOUNTER
Message sent to Dr. Salgado.    ARNOLDO Taylor  Pulm/Sleep [facility]  [phone]

## 2025-06-25 DIAGNOSIS — Z78.0 MENOPAUSE: ICD-10-CM

## 2025-07-03 ENCOUNTER — TELEPHONE (OUTPATIENT)
Dept: FAMILY MEDICINE | Facility: CLINIC | Age: 75
End: 2025-07-03
Payer: MEDICARE

## 2025-07-03 DIAGNOSIS — M81.0 AGE-RELATED OSTEOPOROSIS WITHOUT CURRENT PATHOLOGICAL FRACTURE: Primary | ICD-10-CM

## 2025-07-03 NOTE — TELEPHONE ENCOUNTER
----- Message from Nurse Gordon sent at 7/3/2025 12:41 PM CDT -----  Regarding: RE: prolia scheduling  CMP within 2 weeks of Injection to check Calcium level. Also, No invasive dental procedures within 3 months of administration.  ----- Message -----  From: Osmar Cruz MD  Sent: 7/3/2025  12:36 PM CDT  To: [facility]  Subject: prolia scheduling                                I've placed an order for this patient's prolia injection to be given in August 2025, does she need any other orders in order to have this scheduled?    Thank you    Ren Cruz

## 2025-07-03 NOTE — TELEPHONE ENCOUNTER
Copied from CRM #[id number]. Topic: General Inquiry - Patient Advice  >> Jul 3, 2025 11:18 AM Jono wrote:  .Type: Patient Call Back    Who called: self     What is the request in detail: pt would like Pcp to send an order or rx to [facility] pharmacy so she can get it and have it done at [facility] for her prolia     Can the clinic reply by [facility]? Call back     Would the patient rather a call back or a response via [facility]?  Call back     Best call back number: [phone]      Additional Information:

## 2025-07-03 NOTE — TELEPHONE ENCOUNTER
Patient is requesting a referral to receive the prolia injection here at [facility] , stated she is currently receiving injection with [facility] in the infusion center is currently reaching a 300 dollar bill. Please advise

## 2025-07-10 ENCOUNTER — LAB VISIT (OUTPATIENT)
Dept: LAB | Facility: HOSPITAL | Age: 75
End: 2025-07-10
Attending: INTERNAL MEDICINE
Payer: MEDICARE

## 2025-07-10 DIAGNOSIS — D86.0 SARCOIDOSIS OF LUNG: ICD-10-CM

## 2025-07-10 DIAGNOSIS — E11.9 TYPE 2 DIABETES MELLITUS WITHOUT COMPLICATION, UNSPECIFIED WHETHER LONG TERM INSULIN USE: ICD-10-CM

## 2025-07-10 LAB
ABSOLUTE EOSINOPHIL (OHS): 0.17 K/UL
ABSOLUTE MONOCYTE (OHS): 0.48 K/UL (ref 0.3–1)
ABSOLUTE NEUTROPHIL COUNT (OHS): 2.14 K/UL (ref 1.8–7.7)
ALBUMIN SERPL BCP-MCNC: 3.4 G/DL (ref 3.5–5.2)
ALBUMIN/CREAT UR: 12.2 UG/MG
ALP SERPL-CCNC: 62 UNIT/L (ref 40–150)
ALT SERPL W/O P-5'-P-CCNC: 22 UNIT/L (ref 10–44)
ANION GAP (OHS): 12 MMOL/L (ref 8–16)
AST SERPL-CCNC: 20 UNIT/L (ref 11–45)
BASOPHILS # BLD AUTO: 0.01 K/UL
BASOPHILS NFR BLD AUTO: 0.3 %
BILIRUB SERPL-MCNC: 0.4 MG/DL (ref 0.1–1)
BUN SERPL-MCNC: 31 MG/DL (ref 8–23)
CALCIUM SERPL-MCNC: 9.4 MG/DL (ref 8.7–10.5)
CHLORIDE SERPL-SCNC: 107 MMOL/L (ref 95–110)
CO2 SERPL-SCNC: 25 MMOL/L (ref 23–29)
CREAT SERPL-MCNC: 1.1 MG/DL (ref 0.5–1.4)
CREAT UR-MCNC: 82 MG/DL (ref 15–325)
ERYTHROCYTE [DISTWIDTH] IN BLOOD BY AUTOMATED COUNT: 15.3 % (ref 11.5–14.5)
GFR SERPLBLD CREATININE-BSD FMLA CKD-EPI: 52 ML/MIN/1.73/M2
GLUCOSE SERPL-MCNC: 119 MG/DL (ref 70–110)
HCT VFR BLD AUTO: 34.5 % (ref 37–48.5)
HGB BLD-MCNC: 10.8 GM/DL (ref 12–16)
IMM GRANULOCYTES # BLD AUTO: 0.02 K/UL (ref 0–0.04)
IMM GRANULOCYTES NFR BLD AUTO: 0.6 % (ref 0–0.5)
LYMPHOCYTES # BLD AUTO: 0.78 K/UL (ref 1–4.8)
MCH RBC QN AUTO: 29.7 PG (ref 27–31)
MCHC RBC AUTO-ENTMCNC: 31.3 G/DL (ref 32–36)
MCV RBC AUTO: 95 FL (ref 82–98)
MICROALBUMIN UR-MCNC: 10 UG/ML (ref ?–5000)
NUCLEATED RBC (/100WBC) (OHS): 0 /100 WBC
PLATELET # BLD AUTO: 226 K/UL (ref 150–450)
PMV BLD AUTO: 10.7 FL (ref 9.2–12.9)
POTASSIUM SERPL-SCNC: 4.8 MMOL/L (ref 3.5–5.1)
PROT SERPL-MCNC: 7 GM/DL (ref 6–8.4)
RBC # BLD AUTO: 3.64 M/UL (ref 4–5.4)
RELATIVE EOSINOPHIL (OHS): 4.7 %
RELATIVE LYMPHOCYTE (OHS): 21.7 % (ref 18–48)
RELATIVE MONOCYTE (OHS): 13.3 % (ref 4–15)
RELATIVE NEUTROPHIL (OHS): 59.4 % (ref 38–73)
SODIUM SERPL-SCNC: 144 MMOL/L (ref 136–145)
WBC # BLD AUTO: 3.6 K/UL (ref 3.9–12.7)

## 2025-07-10 PROCEDURE — 85025 COMPLETE CBC W/AUTO DIFF WBC: CPT | Mod: HCNC

## 2025-07-10 PROCEDURE — 36415 COLL VENOUS BLD VENIPUNCTURE: CPT | Mod: HCNC

## 2025-07-10 PROCEDURE — 82043 UR ALBUMIN QUANTITATIVE: CPT | Mod: HCNC

## 2025-07-10 PROCEDURE — 87015 SPECIMEN INFECT AGNT CONCNTJ: CPT | Mod: HCNC

## 2025-07-10 PROCEDURE — 87206 SMEAR FLUORESCENT/ACID STAI: CPT | Mod: HCNC

## 2025-07-10 PROCEDURE — 80053 COMPREHEN METABOLIC PANEL: CPT | Mod: HCNC

## 2025-07-11 LAB — ACID FAST MOD KINY STN SPEC: NORMAL

## 2025-07-17 ENCOUNTER — TELEPHONE (OUTPATIENT)
Dept: INFECTIOUS DISEASES | Facility: CLINIC | Age: 75
End: 2025-07-17
Payer: MEDICARE

## 2025-07-17 LAB — MYCOBACTERIUM SPEC QL CULT: NORMAL

## 2025-07-17 NOTE — TELEPHONE ENCOUNTER
Scheduled pt 10/2 at 10:30am with , also mailed a letter reminder.    ----- Message from Rosa Muñoz sent at 4/29/2025 11:43 AM CDT -----  Regarding: Appointment  Mac pt need f/u with  in October.

## 2025-07-24 ENCOUNTER — HOSPITAL ENCOUNTER (OUTPATIENT)
Dept: RADIOLOGY | Facility: CLINIC | Age: 75
Discharge: HOME | End: 2025-07-24
Attending: INTERNAL MEDICINE
Payer: MEDICARE

## 2025-07-24 DIAGNOSIS — Z78.0 MENOPAUSE: ICD-10-CM

## 2025-07-24 PROCEDURE — 77080 DXA BONE DENSITY AXIAL: CPT | Mod: TC,HCNC,PO

## 2025-07-31 ENCOUNTER — PATIENT OUTREACH (OUTPATIENT)
Dept: ADMINISTRATIVE | Facility: HOSPITAL | Age: 75
End: 2025-07-31
Payer: MEDICARE

## 2025-07-31 NOTE — PROGRESS NOTES
KED- Completed with in the last 30 days. Pt has future Lab Appt Scheduled. Immunization's updated/triggered. Gap report updated.

## 2025-08-07 ENCOUNTER — PATIENT MESSAGE (OUTPATIENT)
Dept: FAMILY MEDICINE | Facility: CLINIC | Age: 75
End: 2025-08-07
Payer: MEDICARE

## 2025-08-12 ENCOUNTER — LAB VISIT (OUTPATIENT)
Dept: LAB | Facility: HOSPITAL | Age: 75
End: 2025-08-12
Attending: INTERNAL MEDICINE
Payer: MEDICARE

## 2025-08-12 DIAGNOSIS — E11.9 TYPE 2 DIABETES MELLITUS WITHOUT COMPLICATION, UNSPECIFIED WHETHER LONG TERM INSULIN USE: ICD-10-CM

## 2025-08-12 DIAGNOSIS — D86.0 SARCOIDOSIS OF LUNG: ICD-10-CM

## 2025-08-12 LAB
ABSOLUTE EOSINOPHIL (OHS): 0.22 K/UL
ABSOLUTE MONOCYTE (OHS): 0.92 K/UL (ref 0.3–1)
ABSOLUTE NEUTROPHIL COUNT (OHS): 2.96 K/UL (ref 1.8–7.7)
ALBUMIN SERPL BCP-MCNC: 3.6 G/DL (ref 3.5–5.2)
ALP SERPL-CCNC: 72 UNIT/L (ref 40–150)
ALT SERPL W/O P-5'-P-CCNC: 20 UNIT/L (ref 10–44)
ANION GAP (OHS): 10 MMOL/L (ref 8–16)
AST SERPL-CCNC: 19 UNIT/L (ref 11–45)
BASOPHILS # BLD AUTO: 0.02 K/UL
BASOPHILS NFR BLD AUTO: 0.4 %
BILIRUB SERPL-MCNC: 0.3 MG/DL (ref 0.1–1)
BUN SERPL-MCNC: 24 MG/DL (ref 8–23)
CALCIUM SERPL-MCNC: 9.6 MG/DL (ref 8.7–10.5)
CHLORIDE SERPL-SCNC: 109 MMOL/L (ref 95–110)
CO2 SERPL-SCNC: 26 MMOL/L (ref 23–29)
CREAT SERPL-MCNC: 1 MG/DL (ref 0.5–1.4)
EAG (OHS): 131 MG/DL (ref 68–131)
ERYTHROCYTE [DISTWIDTH] IN BLOOD BY AUTOMATED COUNT: 15.1 % (ref 11.5–14.5)
GFR SERPLBLD CREATININE-BSD FMLA CKD-EPI: 59 ML/MIN/1.73/M2
GLUCOSE SERPL-MCNC: 108 MG/DL (ref 70–110)
HBA1C MFR BLD: 6.2 % (ref 4–5.6)
HCT VFR BLD AUTO: 35 % (ref 37–48.5)
HGB BLD-MCNC: 11 GM/DL (ref 12–16)
IMM GRANULOCYTES # BLD AUTO: 0.01 K/UL (ref 0–0.04)
IMM GRANULOCYTES NFR BLD AUTO: 0.2 % (ref 0–0.5)
LYMPHOCYTES # BLD AUTO: 0.87 K/UL (ref 1–4.8)
MCH RBC QN AUTO: 30.1 PG (ref 27–31)
MCHC RBC AUTO-ENTMCNC: 31.4 G/DL (ref 32–36)
MCV RBC AUTO: 96 FL (ref 82–98)
NUCLEATED RBC (/100WBC) (OHS): 0 /100 WBC
PLATELET # BLD AUTO: 227 K/UL (ref 150–450)
PMV BLD AUTO: 11.3 FL (ref 9.2–12.9)
POTASSIUM SERPL-SCNC: 4.4 MMOL/L (ref 3.5–5.1)
PROT SERPL-MCNC: 6.7 GM/DL (ref 6–8.4)
RBC # BLD AUTO: 3.65 M/UL (ref 4–5.4)
RELATIVE EOSINOPHIL (OHS): 4.4 %
RELATIVE LYMPHOCYTE (OHS): 17.4 % (ref 18–48)
RELATIVE MONOCYTE (OHS): 18.4 % (ref 4–15)
RELATIVE NEUTROPHIL (OHS): 59.2 % (ref 38–73)
SODIUM SERPL-SCNC: 145 MMOL/L (ref 136–145)
WBC # BLD AUTO: 5 K/UL (ref 3.9–12.7)

## 2025-08-12 PROCEDURE — 82247 BILIRUBIN TOTAL: CPT | Mod: HCNC

## 2025-08-12 PROCEDURE — 85025 COMPLETE CBC W/AUTO DIFF WBC: CPT | Mod: HCNC

## 2025-08-12 PROCEDURE — 83036 HEMOGLOBIN GLYCOSYLATED A1C: CPT | Mod: HCNC

## 2025-08-12 PROCEDURE — 87206 SMEAR FLUORESCENT/ACID STAI: CPT | Mod: HCNC

## 2025-08-12 PROCEDURE — 87015 SPECIMEN INFECT AGNT CONCNTJ: CPT | Mod: HCNC

## 2025-08-12 PROCEDURE — 36415 COLL VENOUS BLD VENIPUNCTURE: CPT | Mod: HCNC

## 2025-08-13 LAB — ACID FAST MOD KINY STN SPEC: NORMAL

## 2025-08-19 LAB — MYCOBACTERIUM SPEC QL CULT: NORMAL

## (undated) DEVICE — SOL NACL IRR 3000ML

## (undated) DEVICE — COVER SNAP KAP 26IN

## (undated) DEVICE — FIBER MOSES 365 DFL

## (undated) DEVICE — SUPPORT ULNA NERVE PROTECTOR

## (undated) DEVICE — BLANKET UPPER BODY 78.7X29.9IN

## (undated) DEVICE — BAG PRESSURE INFUSER 3000CC

## (undated) DEVICE — GOWN NONREINF SET-IN SLV XL

## (undated) DEVICE — MAT QUICK 40X30 FLOOR FLUID LF

## (undated) DEVICE — CANISTER SUCTION RIGID 2000CC

## (undated) DEVICE — SHEATH NAVIGATOR HD 11/13 36

## (undated) DEVICE — WIRE GUIDE .035 150CM.

## (undated) DEVICE — GOWN B1 X-LG X-LONG

## (undated) DEVICE — SYR ONLY LUER LOCK 20CC

## (undated) DEVICE — SYR 10CC LUER LOCK

## (undated) DEVICE — SOL WATER STERILE IRR 500ML

## (undated) DEVICE — CATH URTRL OPEN END STR TIP 5F

## (undated) DEVICE — GLOVE SIGNATURE ESSNTL LTX 6.5

## (undated) DEVICE — GUIDEWIRE ZIPWIRE .035 D 150CM

## (undated) DEVICE — ADAPT UROLOGICAL 2-WAY STOPCK

## (undated) DEVICE — Device

## (undated) DEVICE — CATH FOLEY 2-WAY 8F 3CC

## (undated) DEVICE — CATH FOLEY 2W SIL 8FR 3ML

## (undated) DEVICE — CONTAINER SPECIMEN OR STER 4OZ